# Patient Record
Sex: MALE | Race: WHITE | Employment: OTHER | URBAN - METROPOLITAN AREA
[De-identification: names, ages, dates, MRNs, and addresses within clinical notes are randomized per-mention and may not be internally consistent; named-entity substitution may affect disease eponyms.]

---

## 2017-12-20 LAB
LEFT VENTRICULAR EJECTION FRACTION HIGH VALUE: 60 %
LEFT VENTRICULAR EJECTION FRACTION MODE: NORMAL
LV EF: 55 %

## 2018-06-26 ENCOUNTER — OFFICE VISIT (OUTPATIENT)
Dept: FAMILY MEDICINE CLINIC | Age: 63
End: 2018-06-26
Payer: COMMERCIAL

## 2018-06-26 VITALS
BODY MASS INDEX: 35.49 KG/M2 | TEMPERATURE: 97.5 F | HEART RATE: 67 BPM | SYSTOLIC BLOOD PRESSURE: 118 MMHG | HEIGHT: 68 IN | DIASTOLIC BLOOD PRESSURE: 76 MMHG | OXYGEN SATURATION: 97 % | WEIGHT: 234.2 LBS

## 2018-06-26 DIAGNOSIS — Z12.11 SCREENING FOR COLON CANCER: ICD-10-CM

## 2018-06-26 DIAGNOSIS — I34.0 NON-RHEUMATIC MITRAL REGURGITATION: ICD-10-CM

## 2018-06-26 DIAGNOSIS — Z82.49 FAMILY HISTORY OF CORONARY ARTERIOSCLEROSIS: ICD-10-CM

## 2018-06-26 DIAGNOSIS — F10.11 HISTORY OF ALCOHOL ABUSE: ICD-10-CM

## 2018-06-26 DIAGNOSIS — Z13.220 SCREENING FOR HYPERLIPIDEMIA: ICD-10-CM

## 2018-06-26 DIAGNOSIS — Z87.891 HISTORY OF TOBACCO ABUSE: ICD-10-CM

## 2018-06-26 DIAGNOSIS — K74.5 BILIARY CIRRHOSIS (HCC): ICD-10-CM

## 2018-06-26 DIAGNOSIS — Z13.1 DIABETES MELLITUS SCREENING: ICD-10-CM

## 2018-06-26 DIAGNOSIS — Z23 NEED FOR PROPHYLACTIC VACCINATION AGAINST DIPHTHERIA-TETANUS-PERTUSSIS (DTP): ICD-10-CM

## 2018-06-26 DIAGNOSIS — E78.6 LOW HDL (UNDER 40): ICD-10-CM

## 2018-06-26 DIAGNOSIS — N28.9 RENAL INSUFFICIENCY: ICD-10-CM

## 2018-06-26 DIAGNOSIS — I51.89 DIASTOLIC DYSFUNCTION: ICD-10-CM

## 2018-06-26 DIAGNOSIS — B18.2 CHRONIC HEPATITIS C WITHOUT HEPATIC COMA (HCC): Primary | ICD-10-CM

## 2018-06-26 DIAGNOSIS — R74.8 ELEVATED LIVER ENZYMES: ICD-10-CM

## 2018-06-26 PROCEDURE — 99204 OFFICE O/P NEW MOD 45 MIN: CPT | Performed by: NURSE PRACTITIONER

## 2018-06-26 RX ORDER — LISINOPRIL 10 MG/1
10 TABLET ORAL
COMMUNITY
Start: 2018-01-03 | End: 2018-06-26 | Stop reason: SDUPTHER

## 2018-06-26 ASSESSMENT — PATIENT HEALTH QUESTIONNAIRE - PHQ9
2. FEELING DOWN, DEPRESSED OR HOPELESS: 0
1. LITTLE INTEREST OR PLEASURE IN DOING THINGS: 0
SUM OF ALL RESPONSES TO PHQ9 QUESTIONS 1 & 2: 0
SUM OF ALL RESPONSES TO PHQ QUESTIONS 1-9: 0

## 2018-06-26 ASSESSMENT — ENCOUNTER SYMPTOMS
COUGH: 0
SHORTNESS OF BREATH: 0
EYE DISCHARGE: 0
ABDOMINAL PAIN: 0
BLOOD IN STOOL: 0

## 2018-06-26 NOTE — PROGRESS NOTES
Respiratory: Negative for cough and shortness of breath. Cardiovascular: Negative for chest pain, palpitations and leg swelling. Gastrointestinal: Negative for abdominal pain and blood in stool. Endocrine: Negative for cold intolerance and heat intolerance. Genitourinary: Negative for dysuria and flank pain. Musculoskeletal: Negative for joint swelling and myalgias. Skin: Negative for pallor and rash. Neurological: Negative for dizziness and headaches. Psychiatric/Behavioral: Negative for hallucinations and suicidal ideas. Objective:     Echo 2017   Normal left ventricular ejection fraction. · Mild diastolic dysfunction. · Mild left ventricular hypertrophy. · Mild-to-moderate mitral regurgitation. · Left atrium is mildly dilated. · The aortic valve structure is normal.  · Normal right ventricular systolic function. · The tricuspid valve structure is normal.  · Right atrium is mildly dilated. · No pericardial effusion. Physical Exam   Constitutional: He is oriented to person, place, and time. He appears well-developed and well-nourished. /76   Pulse 67   Temp 97.5 °F (36.4 °C) (Oral)   Ht 5' 8\" (1.727 m)   Wt 234 lb 3.2 oz (106.2 kg)   SpO2 97%   BMI 35.61 kg/m²      HENT:   Head: Normocephalic and atraumatic. Eyes: Conjunctivae and EOM are normal. No scleral icterus. Neck: Neck supple. Cardiovascular: Normal rate, regular rhythm, normal heart sounds and intact distal pulses. Pulmonary/Chest: Effort normal and breath sounds normal. He has no wheezes. He has no rales. Abdominal: Soft. Bowel sounds are normal.   Musculoskeletal: Normal range of motion. He exhibits no edema. Neurological: He is alert and oriented to person, place, and time. Skin: Skin is warm and dry. Psychiatric: He has a normal mood and affect. Nursing note and vitals reviewed.     /76   Pulse 67   Temp 97.5 °F (36.4 °C) (Oral)   Ht 5' 8\" (1.727 m)   Wt 234 lb 3.2 oz prophylactic vaccination against diphtheria-tetanus-pertussis (DTP)     11. Family history of coronary arteriosclerosis     12. History of tobacco abuse     13. History of alcohol abuse     14. Diabetes mellitus screening  Hemoglobin A1C       treatment per GI , [facility]   Labs to f/u on RI  MR, last echo + diast dyf and mild mod MR      Pt has refused colonosc and fit testing with GI he reports. Ref tdap , states had at st [facility] w/in 10 y    Justino Masterson received counseling on the following healthy behaviors: medication adherence  Reviewed prior labs and health maintenance  Continue current medications, diet and exercise. Discussed use, benefit, and side effects of prescribed medications. Barriers to medication compliance addressed. Patient given educational materials - see patient instructions  Was a self-tracking handout given in paper form or via [employer]? Yes    Requested Prescriptions      No prescriptions requested or ordered in this encounter       All patient questions answered. Patient voiced understanding. Quality Measures    Body mass index is 35.61 kg/m². Elevated. Weight control planned discussed Healthy diet and regular exercise. BP: 118/76 Blood pressure is normal. Treatment plan consists of No treatment change needed. No results found for: LDLCALC, LDLCHOLESTEROL, LDLDIRECT (goal LDL reduction with dx if diabetes is 50% LDL reduction)      PHQ Scores 6/26/2018   PHQ2 Score 0   PHQ9 Score 0     Interpretation of Total Score Depression Severity: 1-4 = Minimal depression, 5-9 = Mild depression, 10-14 = Moderate depression, 15-19 = Moderately severe depression, 20-27 = Severe depression    Return in about 2 weeks (around 7/10/2018) for lab f/u .     Orders Placed This Encounter   Procedures    Comprehensive Metabolic Panel     Standing Status:   Future     Standing Expiration Date:   6/26/2019    Lipid Panel     Standing Status:   Future     Standing Expiration Date: 6/26/2019     Order Specific Question:   Is Patient Fasting?/# of Hours     Answer:   12    Hemoglobin A1C     Standing Status:   Future     Standing Expiration Date:   6/26/2019     No orders of the defined types were placed in this encounter. Patient given verbal and/or written educational instructions. Follow up as directed. I have reviewed and agree with the nursing documentation.     Electronically signed by TAMIE Gardner CNP on 6/26/2018 at 1:22 PM

## 2018-06-27 RX ORDER — LISINOPRIL 10 MG/1
10 TABLET ORAL DAILY
Qty: 30 TABLET | Refills: 1 | Status: SHIPPED | OUTPATIENT
Start: 2018-06-27 | End: 2018-08-14 | Stop reason: SINTOL

## 2018-06-29 ENCOUNTER — HOSPITAL ENCOUNTER (OUTPATIENT)
Age: 63
Setting detail: SPECIMEN
Discharge: HOME | End: 2018-06-29
Payer: COMMERCIAL

## 2018-06-29 DIAGNOSIS — Z13.1 DIABETES MELLITUS SCREENING: ICD-10-CM

## 2018-06-29 DIAGNOSIS — R74.8 ELEVATED LIVER ENZYMES: ICD-10-CM

## 2018-06-29 DIAGNOSIS — E78.6 LOW HDL (UNDER 40): ICD-10-CM

## 2018-06-29 DIAGNOSIS — N28.9 RENAL INSUFFICIENCY: ICD-10-CM

## 2018-06-29 LAB
ALBUMIN SERPL-MCNC: 4.6 G/DL (ref 3.5–5.2)
ALBUMIN/GLOBULIN RATIO: 1.5 (ref 1–2.5)
ALP BLD-CCNC: 60 U/L (ref 40–129)
ALT SERPL-CCNC: 43 U/L (ref 5–41)
ANION GAP SERPL CALCULATED.3IONS-SCNC: 15 MMOL/L (ref 9–17)
AST SERPL-CCNC: 33 U/L
BILIRUB SERPL-MCNC: 1.27 MG/DL (ref 0.3–1.2)
BUN BLDV-MCNC: 17 MG/DL (ref 8–23)
BUN/CREAT BLD: ABNORMAL (ref 9–20)
CALCIUM SERPL-MCNC: 9.9 MG/DL (ref 8.6–10.4)
CHLORIDE BLD-SCNC: 104 MMOL/L (ref 98–107)
CHOLESTEROL/HDL RATIO: 7.9
CHOLESTEROL: 220 MG/DL
CO2: 21 MMOL/L (ref 20–31)
CREAT SERPL-MCNC: 1.19 MG/DL (ref 0.7–1.2)
ESTIMATED AVERAGE GLUCOSE: 128 MG/DL
GFR AFRICAN AMERICAN: >60 ML/MIN
GFR NON-AFRICAN AMERICAN: >60 ML/MIN
GFR SERPL CREATININE-BSD FRML MDRD: ABNORMAL ML/MIN/{1.73_M2}
GFR SERPL CREATININE-BSD FRML MDRD: ABNORMAL ML/MIN/{1.73_M2}
GLUCOSE BLD-MCNC: 96 MG/DL (ref 70–99)
HBA1C MFR BLD: 6.1 % (ref 4–6)
HDLC SERPL-MCNC: 28 MG/DL
LDL CHOLESTEROL: 118 MG/DL (ref 0–130)
POTASSIUM SERPL-SCNC: 4.6 MMOL/L (ref 3.7–5.3)
SODIUM BLD-SCNC: 140 MMOL/L (ref 135–144)
TOTAL PROTEIN: 7.7 G/DL (ref 6.4–8.3)
TRIGL SERPL-MCNC: 368 MG/DL
VLDLC SERPL CALC-MCNC: ABNORMAL MG/DL (ref 1–30)

## 2018-07-02 PROBLEM — R73.03 PREDIABETES: Status: ACTIVE | Noted: 2018-07-02

## 2018-07-02 PROBLEM — E78.00 PURE HYPERCHOLESTEROLEMIA: Status: ACTIVE | Noted: 2018-07-02

## 2018-07-06 ENCOUNTER — OFFICE VISIT (OUTPATIENT)
Dept: FAMILY MEDICINE CLINIC | Age: 63
End: 2018-07-06
Payer: COMMERCIAL

## 2018-07-06 VITALS
SYSTOLIC BLOOD PRESSURE: 120 MMHG | WEIGHT: 230.8 LBS | TEMPERATURE: 97.5 F | HEIGHT: 68 IN | OXYGEN SATURATION: 95 % | BODY MASS INDEX: 34.98 KG/M2 | DIASTOLIC BLOOD PRESSURE: 78 MMHG | HEART RATE: 90 BPM

## 2018-07-06 DIAGNOSIS — I34.0 NON-RHEUMATIC MITRAL REGURGITATION: ICD-10-CM

## 2018-07-06 DIAGNOSIS — Z23 NEED FOR SHINGLES VACCINE: ICD-10-CM

## 2018-07-06 DIAGNOSIS — N28.9 RENAL INSUFFICIENCY: ICD-10-CM

## 2018-07-06 DIAGNOSIS — Z00.00 PREVENTATIVE HEALTH CARE: ICD-10-CM

## 2018-07-06 DIAGNOSIS — I51.89 DIASTOLIC DYSFUNCTION: ICD-10-CM

## 2018-07-06 DIAGNOSIS — I51.7 MILD CONCENTRIC LEFT VENTRICULAR HYPERTROPHY (LVH): ICD-10-CM

## 2018-07-06 DIAGNOSIS — Z12.11 SCREENING FOR COLON CANCER: ICD-10-CM

## 2018-07-06 DIAGNOSIS — R73.03 PREDIABETES: Primary | ICD-10-CM

## 2018-07-06 DIAGNOSIS — E78.00 PURE HYPERCHOLESTEROLEMIA: ICD-10-CM

## 2018-07-06 DIAGNOSIS — Z23 NEED FOR DIPHTHERIA-TETANUS-PERTUSSIS (TDAP) VACCINE, ADULT/ADOLESCENT: ICD-10-CM

## 2018-07-06 DIAGNOSIS — R74.8 ELEVATED LIVER ENZYMES: ICD-10-CM

## 2018-07-06 PROCEDURE — 90715 TDAP VACCINE 7 YRS/> IM: CPT | Performed by: NURSE PRACTITIONER

## 2018-07-06 PROCEDURE — 90471 IMMUNIZATION ADMIN: CPT | Performed by: NURSE PRACTITIONER

## 2018-07-06 PROCEDURE — 99214 OFFICE O/P EST MOD 30 MIN: CPT | Performed by: NURSE PRACTITIONER

## 2018-07-06 RX ORDER — FENOFIBRATE 145 MG/1
145 TABLET, COATED ORAL DAILY
Qty: 30 TABLET | Refills: 3 | Status: SHIPPED | OUTPATIENT
Start: 2018-07-06 | End: 2018-11-02 | Stop reason: SDUPTHER

## 2018-07-06 ASSESSMENT — ENCOUNTER SYMPTOMS
CONSTIPATION: 0
COUGH: 0
EYE DISCHARGE: 0
NAUSEA: 0
BLOOD IN STOOL: 0
SHORTNESS OF BREATH: 0
ABDOMINAL PAIN: 0

## 2018-07-06 NOTE — PATIENT INSTRUCTIONS
Patient Education        Prediabetes: Care Instructions  Your Care Instructions    Prediabetes is a warning sign that you are at risk for getting type 2 diabetes. It means that your blood sugar is higher than it should be. The food you eat turns into sugar, which your body uses for energy. Normally, an organ called the pancreas makes insulin, which allows the sugar in your blood to get into your body's cells. But when your body can't use insulin the right way, the sugar doesn't move into cells. It stays in your blood instead. This is called insulin resistance. The buildup of sugar in the blood causes prediabetes. The good news is that lifestyle changes may help you get your blood sugar back to normal and help you avoid or delay diabetes. Follow-up care is a key part of your treatment and safety. Be sure to make and go to all appointments, and call your doctor if you are having problems. It's also a good idea to know your test results and keep a list of the medicines you take. How can you care for yourself at home? · Watch your weight. A healthy weight helps your body use insulin properly. · Limit the amount of calories, sweets, and unhealthy fat you eat. Ask your doctor if you should see a dietitian. A registered dietitian can help you create meal plans that fit your lifestyle. · Get at least 30 minutes of exercise on most days of the week. Exercise helps control your blood sugar. It also helps you maintain a healthy weight. Walking is a good choice. You also may want to do other activities, such as running, swimming, cycling, or playing tennis or team sports. · Do not smoke. Smoking can make prediabetes worse. If you need help quitting, talk to your doctor about stop-smoking programs and medicines. These can increase your chances of quitting for good. · If your doctor prescribed medicines, take them exactly as prescribed. Call your doctor if you think you are having a problem with your medicine.  You will

## 2018-07-19 DIAGNOSIS — Z12.11 SCREENING FOR COLON CANCER: ICD-10-CM

## 2018-07-19 LAB
CONTROL: PRESENT
HEMOCCULT STL QL: NORMAL

## 2018-07-19 PROCEDURE — 82274 ASSAY TEST FOR BLOOD FECAL: CPT | Performed by: NURSE PRACTITIONER

## 2018-07-31 ENCOUNTER — TELEPHONE (OUTPATIENT)
Dept: ONCOLOGY | Age: 63
End: 2018-07-31

## 2018-07-31 DIAGNOSIS — R74.8 ELEVATED LIVER ENZYMES: ICD-10-CM

## 2018-07-31 DIAGNOSIS — E78.00 PURE HYPERCHOLESTEROLEMIA: ICD-10-CM

## 2018-07-31 DIAGNOSIS — R73.03 PREDIABETES: Primary | ICD-10-CM

## 2018-08-01 ENCOUNTER — TELEPHONE (OUTPATIENT)
Dept: FAMILY MEDICINE CLINIC | Age: 63
End: 2018-08-01

## 2018-08-01 ENCOUNTER — HOSPITAL ENCOUNTER (OUTPATIENT)
Age: 63
Setting detail: SPECIMEN
Discharge: HOME | End: 2018-08-01
Payer: COMMERCIAL

## 2018-08-01 DIAGNOSIS — N28.9 RENAL INSUFFICIENCY: Primary | ICD-10-CM

## 2018-08-01 DIAGNOSIS — R76.8 POSITIVE HEPATITIS C ANTIBODY TEST: ICD-10-CM

## 2018-08-01 DIAGNOSIS — R74.8 ELEVATED LIVER ENZYMES: ICD-10-CM

## 2018-08-01 DIAGNOSIS — N28.9 RENAL INSUFFICIENCY: ICD-10-CM

## 2018-08-01 DIAGNOSIS — Z00.00 PREVENTATIVE HEALTH CARE: ICD-10-CM

## 2018-08-01 LAB
ALBUMIN SERPL-MCNC: 4.7 G/DL (ref 3.5–5.2)
ALBUMIN/GLOBULIN RATIO: 1.5 (ref 1–2.5)
ALP BLD-CCNC: 48 U/L (ref 40–129)
ALT SERPL-CCNC: 50 U/L (ref 5–41)
ANION GAP SERPL CALCULATED.3IONS-SCNC: 14 MMOL/L (ref 9–17)
AST SERPL-CCNC: 37 U/L
BILIRUB SERPL-MCNC: 0.75 MG/DL (ref 0.3–1.2)
BUN BLDV-MCNC: 19 MG/DL (ref 8–23)
BUN/CREAT BLD: ABNORMAL (ref 9–20)
CALCIUM SERPL-MCNC: 10.3 MG/DL (ref 8.6–10.4)
CHLORIDE BLD-SCNC: 103 MMOL/L (ref 98–107)
CO2: 23 MMOL/L (ref 20–31)
CREAT SERPL-MCNC: 1.43 MG/DL (ref 0.7–1.2)
GFR AFRICAN AMERICAN: >60 ML/MIN
GFR NON-AFRICAN AMERICAN: 50 ML/MIN
GFR SERPL CREATININE-BSD FRML MDRD: ABNORMAL ML/MIN/{1.73_M2}
GFR SERPL CREATININE-BSD FRML MDRD: ABNORMAL ML/MIN/{1.73_M2}
GLUCOSE BLD-MCNC: 96 MG/DL (ref 70–99)
HEPATITIS C ANTIBODY: REACTIVE
HIV AG/AB: NONREACTIVE
POTASSIUM SERPL-SCNC: 4.9 MMOL/L (ref 3.7–5.3)
PROSTATE SPECIFIC ANTIGEN: 0.29 UG/L
SODIUM BLD-SCNC: 140 MMOL/L (ref 135–144)
TOTAL PROTEIN: 7.9 G/DL (ref 6.4–8.3)

## 2018-08-01 NOTE — TELEPHONE ENCOUNTER
Kidneys appear strained per labs. I see this in his history. Should increase water intake and repeat labs next week. Zhanna Hoang His Hepatitis screening came back + so am placing referral to GI for him. (He has appt 8/14 )  Please update patient.

## 2018-08-02 ENCOUNTER — TELEPHONE (OUTPATIENT)
Dept: FAMILY MEDICINE CLINIC | Age: 63
End: 2018-08-02

## 2018-08-14 ENCOUNTER — HOSPITAL ENCOUNTER (OUTPATIENT)
Age: 63
Setting detail: SPECIMEN
Discharge: HOME | End: 2018-08-14
Payer: COMMERCIAL

## 2018-08-14 ENCOUNTER — OFFICE VISIT (OUTPATIENT)
Dept: FAMILY MEDICINE CLINIC | Age: 63
End: 2018-08-14
Payer: COMMERCIAL

## 2018-08-14 ENCOUNTER — TELEPHONE (OUTPATIENT)
Dept: FAMILY MEDICINE CLINIC | Age: 63
End: 2018-08-14

## 2018-08-14 VITALS
HEART RATE: 97 BPM | SYSTOLIC BLOOD PRESSURE: 124 MMHG | TEMPERATURE: 97.2 F | HEIGHT: 68 IN | BODY MASS INDEX: 35.52 KG/M2 | OXYGEN SATURATION: 97 % | DIASTOLIC BLOOD PRESSURE: 76 MMHG | WEIGHT: 234.4 LBS

## 2018-08-14 DIAGNOSIS — R73.03 PREDIABETES: ICD-10-CM

## 2018-08-14 DIAGNOSIS — I34.0 NON-RHEUMATIC MITRAL REGURGITATION: ICD-10-CM

## 2018-08-14 DIAGNOSIS — E78.00 PURE HYPERCHOLESTEROLEMIA: ICD-10-CM

## 2018-08-14 DIAGNOSIS — N28.9 RENAL INSUFFICIENCY: Primary | ICD-10-CM

## 2018-08-14 DIAGNOSIS — R74.8 ELEVATED LIVER ENZYMES: ICD-10-CM

## 2018-08-14 DIAGNOSIS — N28.9 RENAL INSUFFICIENCY: ICD-10-CM

## 2018-08-14 DIAGNOSIS — Z23 NEED FOR PROPHYLACTIC VACCINATION AND INOCULATION AGAINST VARICELLA: ICD-10-CM

## 2018-08-14 LAB
ANION GAP SERPL CALCULATED.3IONS-SCNC: 15 MMOL/L (ref 9–17)
BUN BLDV-MCNC: 21 MG/DL (ref 8–23)
BUN/CREAT BLD: ABNORMAL (ref 9–20)
CALCIUM SERPL-MCNC: 10.2 MG/DL (ref 8.6–10.4)
CHLORIDE BLD-SCNC: 105 MMOL/L (ref 98–107)
CO2: 22 MMOL/L (ref 20–31)
CREAT SERPL-MCNC: 1.51 MG/DL (ref 0.7–1.2)
GFR AFRICAN AMERICAN: 57 ML/MIN
GFR NON-AFRICAN AMERICAN: 47 ML/MIN
GFR SERPL CREATININE-BSD FRML MDRD: ABNORMAL ML/MIN/{1.73_M2}
GFR SERPL CREATININE-BSD FRML MDRD: ABNORMAL ML/MIN/{1.73_M2}
GLUCOSE BLD-MCNC: 122 MG/DL (ref 70–99)
POTASSIUM SERPL-SCNC: 4.5 MMOL/L (ref 3.7–5.3)
SODIUM BLD-SCNC: 142 MMOL/L (ref 135–144)

## 2018-08-14 PROCEDURE — 99214 OFFICE O/P EST MOD 30 MIN: CPT | Performed by: NURSE PRACTITIONER

## 2018-08-14 ASSESSMENT — ENCOUNTER SYMPTOMS
COUGH: 0
BLOOD IN STOOL: 0
SHORTNESS OF BREATH: 0
EYE DISCHARGE: 0
ABDOMINAL PAIN: 0

## 2018-08-14 NOTE — PROGRESS NOTES
08/01/2018    CREATININE 1.43 08/01/2018    GFRAA >60 08/01/2018    LABGLOM 50 08/01/2018    GLUCOSE 96 08/01/2018    PROT 7.9 08/01/2018    LABALBU 4.7 08/01/2018    CALCIUM 10.3 08/01/2018    BILITOT 0.75 08/01/2018    ALKPHOS 48 08/01/2018    AST 37 08/01/2018    ALT 50 08/01/2018     Lab Results   Component Value Date    LABA1C 6.1 (H) 06/29/2018           Assessment:       Diagnosis Orders   1. Renal insufficiency     2. Pure hypercholesterolemia  Lipid Panel   3. Prediabetes     4. Non-rheumatic mitral regurgitation     5. Elevated liver enzymes     6. Need for prophylactic vaccination and inoculation against varicella  zoster recombinant adjuvanted vaccine (SHINGRIX) 50 MCG SUSR injection       Plan:       Diagnosis Orders   1. Renal insufficiency     2. Pure hypercholesterolemia  Lipid Panel   3. Prediabetes     4. Non-rheumatic mitral regurgitation     5. Elevated liver enzymes     6. Need for prophylactic vaccination and inoculation against varicella  zoster recombinant adjuvanted vaccine (SHINGRIX) 50 MCG SUSR injection     1. Renal insufficiency  If gfr remains down, Cr up, will d/c ace I  Labs ot be done today as not done yet     2. Pure hypercholesterolemia  On tricor. recdheck 1 mo'    3. Prediabetes  The patient is asked to make an attempt to improve diet and exercise patterns to aid in medical management of this problem. 4. Non-rheumatic mitral regurgitation  On BB, Stable. Continue  current therapy      5. Elevated liver enzymes  Single enzyme will monitor      6. Need for prophylactic vaccination and inoculation against varicella        Gisel Ball received counseling on the following healthy behaviors: medication adherence  Reviewed prior labs and health maintenance  Continue current medications, diet and exercise. Discussed use, benefit, and side effects of prescribed medications. Barriers to medication compliance addressed.    Patient given educational materials - see patient instructions  Was

## 2018-08-14 NOTE — TELEPHONE ENCOUNTER
Please update patient. We are d/c'ing his lisinopril. His labs have worsened slightly. Again, I recommend increasing water intake, will recheck in 2 w.      If no improvement, will send him to kidney specialist.

## 2018-09-05 ENCOUNTER — HOSPITAL ENCOUNTER (OUTPATIENT)
Age: 63
Setting detail: SPECIMEN
Discharge: HOME | End: 2018-09-05
Payer: COMMERCIAL

## 2018-09-05 DIAGNOSIS — N28.9 RENAL INSUFFICIENCY: ICD-10-CM

## 2018-09-05 DIAGNOSIS — E78.00 PURE HYPERCHOLESTEROLEMIA: ICD-10-CM

## 2018-09-05 LAB
ANION GAP SERPL CALCULATED.3IONS-SCNC: 17 MMOL/L (ref 9–17)
BUN BLDV-MCNC: 19 MG/DL (ref 8–23)
BUN/CREAT BLD: ABNORMAL (ref 9–20)
CALCIUM SERPL-MCNC: 9.4 MG/DL (ref 8.6–10.4)
CHLORIDE BLD-SCNC: 105 MMOL/L (ref 98–107)
CHOLESTEROL/HDL RATIO: 7.8
CHOLESTEROL: 217 MG/DL
CO2: 18 MMOL/L (ref 20–31)
CREAT SERPL-MCNC: 1.39 MG/DL (ref 0.7–1.2)
GFR AFRICAN AMERICAN: >60 ML/MIN
GFR NON-AFRICAN AMERICAN: 52 ML/MIN
GFR SERPL CREATININE-BSD FRML MDRD: ABNORMAL ML/MIN/{1.73_M2}
GFR SERPL CREATININE-BSD FRML MDRD: ABNORMAL ML/MIN/{1.73_M2}
GLUCOSE BLD-MCNC: 105 MG/DL (ref 70–99)
HDLC SERPL-MCNC: 28 MG/DL
LDL CHOLESTEROL: 132 MG/DL (ref 0–130)
POTASSIUM SERPL-SCNC: 4.1 MMOL/L (ref 3.7–5.3)
SODIUM BLD-SCNC: 140 MMOL/L (ref 135–144)
TRIGL SERPL-MCNC: 284 MG/DL
VLDLC SERPL CALC-MCNC: ABNORMAL MG/DL (ref 1–30)

## 2018-09-11 ENCOUNTER — OFFICE VISIT (OUTPATIENT)
Dept: FAMILY MEDICINE CLINIC | Age: 63
End: 2018-09-11
Payer: COMMERCIAL

## 2018-09-11 VITALS
DIASTOLIC BLOOD PRESSURE: 66 MMHG | HEART RATE: 69 BPM | SYSTOLIC BLOOD PRESSURE: 124 MMHG | WEIGHT: 235.6 LBS | BODY MASS INDEX: 35.71 KG/M2 | OXYGEN SATURATION: 99 % | TEMPERATURE: 97.5 F | HEIGHT: 68 IN

## 2018-09-11 DIAGNOSIS — E66.01 SEVERE OBESITY (BMI 35.0-39.9): ICD-10-CM

## 2018-09-11 DIAGNOSIS — Z86.19 HISTORY OF HEPATITIS C: ICD-10-CM

## 2018-09-11 DIAGNOSIS — I51.7 MILD CONCENTRIC LEFT VENTRICULAR HYPERTROPHY (LVH): ICD-10-CM

## 2018-09-11 DIAGNOSIS — E78.00 PURE HYPERCHOLESTEROLEMIA: ICD-10-CM

## 2018-09-11 DIAGNOSIS — N28.9 RENAL INSUFFICIENCY: ICD-10-CM

## 2018-09-11 DIAGNOSIS — I10 ESSENTIAL HYPERTENSION: Primary | ICD-10-CM

## 2018-09-11 DIAGNOSIS — R73.03 PREDIABETES: ICD-10-CM

## 2018-09-11 DIAGNOSIS — I50.32 CHRONIC DIASTOLIC HEART FAILURE (HCC): ICD-10-CM

## 2018-09-11 PROCEDURE — 99214 OFFICE O/P EST MOD 30 MIN: CPT | Performed by: NURSE PRACTITIONER

## 2018-09-11 ASSESSMENT — ENCOUNTER SYMPTOMS
SHORTNESS OF BREATH: 0
BLOOD IN STOOL: 0
ABDOMINAL PAIN: 0
COUGH: 0
EYE DISCHARGE: 0

## 2018-09-11 NOTE — PROGRESS NOTES
[address] [id number]  [address] [place] [id number]  Dept: [phone]  Dept Fax: [phone]    Fernanda Geiger is a 66 y.o. male who presents today for his medical conditions/complaints as noted below. Fernanda Geiger is c/o of Hypertension and Medication Check        HPI:     Patient presents with:  Hypertension The home BP readings have been in the 120's / 80s range. Renal insuff, d/c lisinopril , and Cr and GFR improved. Good h20 intake   Refusing referral to nephro. Sees dr Blake ULLOA for hep C   Sees cardio dr Alessia Odonnell- doing well, no issues             Past Medical History:   Diagnosis Date    Hepatitis C     Hypertension     Liver disease     sees GI, treated for Hep C     Non-rheumatic mitral regurgitation     sees cardio,    Renal insufficiency 6/26/2018    Severe obesity (BMI 35.0-39.9) ([address] 9/11/2018    Ventricular tachy[facility] [address]       History reviewed. No pertinent surgical history. Family History   Problem Relation Age of Onset    Breast Cancer Mother     Cancer Mother     Coronary Art Dis Father         fatal MI 72       Social History   Substance Use Topics    Smoking status: Former Smoker     Packs/day: 1.50     Years: 40.00     Start date: 8/14/1977     Quit date: 4/20/2015    Smokeless tobacco: Never Used      Comment: quit 2015     Alcohol use 4.2 oz/week     7 Cans of beer per week      Comment: 7 beers/w; ; prev drinking 3-4 X/wk, apprx 40 y      Current Outpatient Prescriptions   Medication Sig Dispense Refill    fenofibrate (TRICOR) 145 MG tablet Take 1 tablet by mouth daily 30 tablet 3    metoprolol tartrate (LOPRESSOR) 25 MG tablet take 1 tablet by mouth twice a day       No current facility-administered medications for this visit. No Known Allergies      Subjective:      Review of Systems   Constitutional: Negative for activity change, chills, fatigue and fever.    Eyes: Negative for discharge and visual disturbance. Respiratory: Negative for cough and shortness of breath. Cardiovascular: Negative for chest pain and leg swelling. Gastrointestinal: Negative for abdominal pain and blood in stool. Endocrine: Negative for cold intolerance and heat intolerance. Genitourinary: Negative for dysuria and flank pain. Musculoskeletal: Negative for joint swelling and myalgias. Skin: Negative for pallor and rash. Neurological: Negative for dizziness and headaches. Psychiatric/Behavioral: Negative for hallucinations and suicidal ideas. Objective:     Physical Exam   Constitutional: He is oriented to person, place, and time. He appears well-developed and well-nourished. /66   Pulse 69   Temp 97.5 °F (36.4 °C) (Oral)   Ht 5' 8\" (1.727 m)   Wt 235 lb 9.6 oz (106.9 kg)   SpO2 99%   BMI 35.82 kg/m²      HENT:   Head: Normocephalic and atraumatic. Eyes: Conjunctivae and EOM are normal. No scleral icterus. Neck: Neck supple. Cardiovascular: Normal rate, regular rhythm, normal heart sounds and intact distal pulses. Pulmonary/Chest: Effort normal and breath sounds normal. He has no wheezes. He has no rales. Abdominal: Soft. Bowel sounds are normal.   Musculoskeletal: Normal range of motion. He exhibits no edema. Neurological: He is alert and oriented to person, place, and time. Skin: Skin is warm and dry. Psychiatric: He has a normal mood and affect. Nursing note and vitals reviewed.     /66   Pulse 69   Temp 97.5 °F (36.4 °C) (Oral)   Ht 5' 8\" (1.727 m)   Wt 235 lb 9.6 oz (106.9 kg)   SpO2 99%   BMI 35.82 kg/m²     CBC: No results found for: WBC, RBC, HGB, HCT, MCV, MCH, MCHC, RDW, PLT, MPV  CMP:    Lab Results   Component Value Date     09/05/2018    K 4.1 09/05/2018     09/05/2018    CO2 18 09/05/2018    BUN 19 09/05/2018    CREATININE 1.39 09/05/2018    GFRAA >60 09/05/2018    LABGLOM 52 09/05/2018    GLUCOSE 105 09/05/2018    PROT 7.9 Concern for statin d/t h/o hep C  He plans to discuss with his cardiologist.   [id number] [facility] educational materials  given       7. Prediabetes  The patient is asked to make an attempt to improve diet and exercise patterns to aid in medical management of this problem. Educational materials  given       8. History of hepatitis C  Under care/monitor of GI  Will not order statin ; will remain on fenofibrate. Pt states he is tired of seeing doctors. Encouraged  Patient to make an attempt to improve diet and exercise patterns, and encouraged education on his health status. Jonah Cotter received counseling on the following healthy behaviors: medication adherence  Reviewed prior labs and health maintenance  Continue current medications, diet and exercise. Discussed use, benefit, and side effects of prescribed medications. Barriers to medication compliance addressed. Patient given educational materials - see patient instructions  Was a self-tracking handout given in paper form or via [employer]? Yes    Requested Prescriptions      No prescriptions requested or ordered in this encounter       All patient questions answered. Patient voiced understanding. Quality Measures    Body mass index is 35.82 kg/m². Elevated. Weight control planned discussed Healthy diet and regular exercise. BP: 124/66 Blood pressure is normal. Treatment plan consists of No treatment change needed. Lab Results   Component Value Date    LDLCHOLESTEROL 132 (H) 09/05/2018    (goal LDL reduction with dx if diabetes is 50% LDL reduction)      PHQ Scores 6/26/2018   PHQ2 Score 0   PHQ9 Score 0     Interpretation of Total Score Depression Severity: 1-4 = Minimal depression, 5-9 = Mild depression, 10-14 = Moderate depression, 15-19 = Moderately severe depression, 20-27 = Severe depression    No Follow-up on file. No orders of the defined types were placed in this encounter.     No orders of the defined types were placed in this

## 2018-09-11 NOTE — PATIENT INSTRUCTIONS
get more details on the specific medicines your doctor prescribes. When should you call for help? Watch closely for changes in your health, and be sure to contact your doctor if:    · You have any symptoms of diabetes. These may include:  ¨ Being thirsty more often. ¨ Urinating more. ¨ Being hungrier. ¨ Losing weight. ¨ Being very tired. ¨ Having blurry vision.     · You have a wound that will not heal.     · You have an infection that will not go away.     · You have problems with your blood pressure.     · You want more information about diabetes and how you can keep from getting it. Where can you learn more? Go to [email or web address] org and sign in to your [employer] account. Enter I222 in the [employer] box to learn more about \"Prediabetes: Care Instructions. \"     If you do not have an account, please click on the \"Sign Up Now\" link. Current as of: December 7, 2017  Content Version: 11.7  © 9342-1003 [employer], [employer]. Care instructions adapted under license by [facility] ([facility]). If you have questions about a medical condition or this instruction, always ask your healthcare professional. [address] any warranty or liability for your use of this information. Patient Education        Learning About the Mediterranean Diet  What is the [id number]? The Mediterranean diet is a style of eating rather than a diet plan. It features foods eaten in [place], [place], [place] and [place], and other countries along the [facility]. It emphasizes eating foods like fish, fruits, vegetables, beans, high-fiber breads and whole grains, nuts, and olive oil. This style of eating includes limited red meat, cheese, and sweets. Why choose the Mediterranean diet? A Mediterranean-style diet may improve heart health. It contains more fat than other heart-healthy diets.  But the fats are mainly from nuts, unsaturated oils (such as fish oils and vinaigrette dressing or hummus instead of dips made from mayonnaise or sour cream.  · Have a piece of fruit for dessert instead of a piece of cake. Try baked apples, or have some dried fruit. Tips for eating out  · Try broiled, grilled, baked, or poached fish instead of having it fried or breaded. · Ask your  to have your meals prepared with olive oil instead of butter. · Order dishes made with marinara sauce or sauces made from olive oil. Avoid sauces made from cream or mayonnaise. · Choose whole-grain breads, whole wheat pasta and pizza crust, brown rice, beans, and lentils. · Cut back on butter or margarine on bread. Instead, you can dip your bread in a small amount of olive oil. · Ask for a side salad or grilled vegetables instead of french fries or chips. Where can you learn more? Go to https://Night [email or web address] Day [email or web address] org and sign in to your [employer] account. Enter [phone] in the [facility] box to learn more about \"Learning About the Mediterranean Diet. \"     If you do not have an account, please click on the \"Sign Up Now\" link. Current as of: May 12, 2017  Content Version: 11.7  © 7224-3020 [employer]. Care instructions adapted under license by [facility] ([facility]). If you have questions about a medical condition or this instruction, always ask your healthcare professional. [place] [address] any warranty or liability for your use of this information.

## 2018-09-12 ENCOUNTER — TELEPHONE (OUTPATIENT)
Dept: FAMILY MEDICINE CLINIC | Age: 63
End: 2018-09-12

## 2018-10-16 ENCOUNTER — NURSE ONLY (OUTPATIENT)
Dept: FAMILY MEDICINE CLINIC | Age: 63
End: 2018-10-16
Payer: COMMERCIAL

## 2018-10-16 VITALS — WEIGHT: 241 LBS | BODY MASS INDEX: 36.64 KG/M2

## 2018-10-16 DIAGNOSIS — Z23 NEED FOR INFLUENZA VACCINATION: Primary | ICD-10-CM

## 2018-10-23 PROCEDURE — 90688 IIV4 VACCINE SPLT 0.5 ML IM: CPT | Performed by: PHYSICIAN ASSISTANT

## 2018-10-23 PROCEDURE — 90471 IMMUNIZATION ADMIN: CPT | Performed by: PHYSICIAN ASSISTANT

## 2018-12-11 ENCOUNTER — OFFICE VISIT (OUTPATIENT)
Dept: FAMILY MEDICINE CLINIC | Age: 63
End: 2018-12-11
Payer: COMMERCIAL

## 2018-12-11 VITALS
HEART RATE: 85 BPM | BODY MASS INDEX: 36.81 KG/M2 | WEIGHT: 242.9 LBS | HEIGHT: 68 IN | DIASTOLIC BLOOD PRESSURE: 82 MMHG | SYSTOLIC BLOOD PRESSURE: 128 MMHG | TEMPERATURE: 98 F | OXYGEN SATURATION: 98 %

## 2018-12-11 DIAGNOSIS — I10 ESSENTIAL HYPERTENSION: Primary | ICD-10-CM

## 2018-12-11 DIAGNOSIS — I50.32 CHRONIC DIASTOLIC HEART FAILURE (HCC): ICD-10-CM

## 2018-12-11 DIAGNOSIS — Z82.49 FAMILY HISTORY OF CORONARY ARTERIOSCLEROSIS: ICD-10-CM

## 2018-12-11 DIAGNOSIS — Z86.19 HISTORY OF HEPATITIS C: ICD-10-CM

## 2018-12-11 DIAGNOSIS — Z23 NEED FOR VACCINATION FOR PNEUMOCOCCUS: ICD-10-CM

## 2018-12-11 DIAGNOSIS — L91.8 SKIN TAG: ICD-10-CM

## 2018-12-11 DIAGNOSIS — E78.00 PURE HYPERCHOLESTEROLEMIA: ICD-10-CM

## 2018-12-11 PROCEDURE — 90732 PPSV23 VACC 2 YRS+ SUBQ/IM: CPT | Performed by: NURSE PRACTITIONER

## 2018-12-11 PROCEDURE — 90471 IMMUNIZATION ADMIN: CPT | Performed by: NURSE PRACTITIONER

## 2018-12-11 PROCEDURE — 99214 OFFICE O/P EST MOD 30 MIN: CPT | Performed by: NURSE PRACTITIONER

## 2018-12-11 ASSESSMENT — ENCOUNTER SYMPTOMS
ABDOMINAL PAIN: 0
BLOOD IN STOOL: 0
SHORTNESS OF BREATH: 0
EYE DISCHARGE: 0
COUGH: 0

## 2018-12-11 NOTE — PROGRESS NOTES
After obtaining consent, and per orders of Dr. Megan Sahni injection of pneumovax 23 given in left deltoid by Monica Hyatt. Patient instructed to remain in clinic for 20 minutes afterwards, and to report any adverse reaction to me immediately.  No reaction at this time

## 2018-12-11 NOTE — PROGRESS NOTES
[facility] [address]  [address] [place], [place] [id number]  Dept: [phone]  Dept Fax: [phone]    Katherine Wilson is a 66 y.o. male who presents today for his medical conditions/complaintsas noted below. Katherine Wilson is c/o of Hypertension; Mass (both armpits); and Referral - General (derm)        HPI:     Patient presents with:  Hypertension  no issues. compliant with meds/no SE      Mass: both armpits   Duration; been there 'forever\"  No ithcing, unsighlty   No bleeding  Static in size   Has seen derm in past [place]     Referral - General: derm    HDL, off statin d/t h/o hep c  Now taking fenofibrate only     Sees Cardio dr Tomeka Khan    H/o hep C , next liver US in approx feb march 2019 for monitoring             Past Medical History:   Diagnosis Date    Hepatitis C     Hypertension     Liver disease     sees GI, treated for Hep C     Non-rheumatic mitral regurgitation     sees cardio,    Renal insufficiency 6/26/2018    Severe obesity (BMI 35.0-39.9) 9/11/2018    Ventricular tachycardia ([address]      History reviewed. No pertinent surgical history. Family History   Problem Relation Age of Onset    Breast Cancer Mother     Cancer Mother     Coronary Art Dis Father         fatal MI 72       Social History   Substance Use Topics    Smoking status: Former Smoker     Packs/day: 1.50     Years: 40.00     Start date: 8/14/1977     Quit date: 4/20/2015    Smokeless tobacco: Never Used      Comment: quit 2015     Alcohol use 4.2 oz/week     7 Cans of beer per week      Comment: 7 beers/w; ; prev drinking 3-4 X/wk, apprx 40 y      Current Outpatient Prescriptions   Medication Sig Dispense Refill    fenofibrate (TRICOR) 145 MG tablet take 1 tablet by mouth once daily 90 tablet 1    metoprolol tartrate (LOPRESSOR) 25 MG tablet take 1 tablet by mouth twice a day       No current facility-administered medications for this visit.       No Known Allergies      Subjective:

## 2019-03-08 ENCOUNTER — HOSPITAL ENCOUNTER (OUTPATIENT)
Age: 64
Setting detail: SPECIMEN
Discharge: HOME | End: 2019-03-08
Payer: COMMERCIAL

## 2019-03-08 LAB
ABSOLUTE EOS #: 0.09 K/UL (ref 0–0.44)
ABSOLUTE IMMATURE GRANULOCYTE: 0.04 K/UL (ref 0–0.3)
ABSOLUTE LYMPH #: 1.89 K/UL (ref 1.1–3.7)
ABSOLUTE MONO #: 0.65 K/UL (ref 0.1–1.2)
AFP: 7.8 UG/L
ANION GAP SERPL CALCULATED.3IONS-SCNC: 14 MMOL/L (ref 9–17)
BASOPHILS # BLD: 1 % (ref 0–2)
BASOPHILS ABSOLUTE: 0.06 K/UL (ref 0–0.2)
BUN BLDV-MCNC: 17 MG/DL (ref 8–23)
BUN/CREAT BLD: ABNORMAL (ref 9–20)
CALCIUM SERPL-MCNC: 9.9 MG/DL (ref 8.6–10.4)
CHLORIDE BLD-SCNC: 104 MMOL/L (ref 98–107)
CO2: 22 MMOL/L (ref 20–31)
CREAT SERPL-MCNC: 1.47 MG/DL (ref 0.7–1.2)
DIFFERENTIAL TYPE: ABNORMAL
EOSINOPHILS RELATIVE PERCENT: 2 % (ref 1–4)
GFR AFRICAN AMERICAN: 59 ML/MIN
GFR NON-AFRICAN AMERICAN: 49 ML/MIN
GFR SERPL CREATININE-BSD FRML MDRD: ABNORMAL ML/MIN/{1.73_M2}
GFR SERPL CREATININE-BSD FRML MDRD: ABNORMAL ML/MIN/{1.73_M2}
GLUCOSE BLD-MCNC: 123 MG/DL (ref 70–99)
HCT VFR BLD CALC: 50.6 % (ref 40.7–50.3)
HEMOGLOBIN: 16.6 G/DL (ref 13–17)
IMMATURE GRANULOCYTES: 1 %
INR BLD: 1.1
LYMPHOCYTES # BLD: 33 % (ref 24–43)
MCH RBC QN AUTO: 30.7 PG (ref 25.2–33.5)
MCHC RBC AUTO-ENTMCNC: 32.8 G/DL (ref 28.4–34.8)
MCV RBC AUTO: 93.7 FL (ref 82.6–102.9)
MONOCYTES # BLD: 11 % (ref 3–12)
NRBC AUTOMATED: 0 PER 100 WBC
PDW BLD-RTO: 13.5 % (ref 11.8–14.4)
PLATELET # BLD: 251 K/UL (ref 138–453)
PLATELET ESTIMATE: ABNORMAL
PMV BLD AUTO: 10.5 FL (ref 8.1–13.5)
POTASSIUM SERPL-SCNC: 4.4 MMOL/L (ref 3.7–5.3)
PROTHROMBIN TIME: 11.5 SEC (ref 9–12)
RBC # BLD: 5.4 M/UL (ref 4.21–5.77)
RBC # BLD: ABNORMAL 10*6/UL
SEG NEUTROPHILS: 52 % (ref 36–65)
SEGMENTED NEUTROPHILS ABSOLUTE COUNT: 3.08 K/UL (ref 1.5–8.1)
SODIUM BLD-SCNC: 140 MMOL/L (ref 135–144)
WBC # BLD: 5.8 K/UL (ref 3.5–11.3)
WBC # BLD: ABNORMAL 10*3/UL

## 2019-03-21 ENCOUNTER — OFFICE VISIT (OUTPATIENT)
Dept: FAMILY MEDICINE CLINIC | Age: 64
End: 2019-03-21
Payer: COMMERCIAL

## 2019-03-21 VITALS
HEIGHT: 68 IN | TEMPERATURE: 97 F | WEIGHT: 249 LBS | SYSTOLIC BLOOD PRESSURE: 119 MMHG | DIASTOLIC BLOOD PRESSURE: 78 MMHG | BODY MASS INDEX: 37.74 KG/M2 | HEART RATE: 77 BPM

## 2019-03-21 DIAGNOSIS — R73.03 PREDIABETES: Primary | ICD-10-CM

## 2019-03-21 DIAGNOSIS — Z87.891 PERSONAL HISTORY OF TOBACCO USE: ICD-10-CM

## 2019-03-21 DIAGNOSIS — R63.5 WEIGHT GAIN: ICD-10-CM

## 2019-03-21 DIAGNOSIS — Z86.19 HISTORY OF HEPATITIS C: ICD-10-CM

## 2019-03-21 DIAGNOSIS — E78.00 PURE HYPERCHOLESTEROLEMIA: ICD-10-CM

## 2019-03-21 DIAGNOSIS — I10 ESSENTIAL HYPERTENSION: ICD-10-CM

## 2019-03-21 DIAGNOSIS — N28.9 RENAL INSUFFICIENCY: ICD-10-CM

## 2019-03-21 DIAGNOSIS — I50.32 CHRONIC DIASTOLIC HEART FAILURE (HCC): ICD-10-CM

## 2019-03-21 DIAGNOSIS — Z00.00 ENCOUNTER FOR MEDICAL EXAMINATION TO ESTABLISH CARE: ICD-10-CM

## 2019-03-21 LAB — HBA1C MFR BLD: 6.6 %

## 2019-03-21 PROCEDURE — 83036 HEMOGLOBIN GLYCOSYLATED A1C: CPT | Performed by: PHYSICIAN ASSISTANT

## 2019-03-21 PROCEDURE — G0296 VISIT TO DETERM LDCT ELIG: HCPCS | Performed by: PHYSICIAN ASSISTANT

## 2019-03-21 PROCEDURE — 99214 OFFICE O/P EST MOD 30 MIN: CPT | Performed by: PHYSICIAN ASSISTANT

## 2019-03-21 ASSESSMENT — ENCOUNTER SYMPTOMS
COUGH: 0
CHEST TIGHTNESS: 0
SHORTNESS OF BREATH: 0
WHEEZING: 0
VOMITING: 0
NAUSEA: 0
DIARRHEA: 0
EYE REDNESS: 0
CONSTIPATION: 0
BLOOD IN STOOL: 0
BACK PAIN: 0
SORE THROAT: 0
SINUS PRESSURE: 0
ABDOMINAL DISTENTION: 0
COLOR CHANGE: 0
PHOTOPHOBIA: 0
ABDOMINAL PAIN: 0

## 2019-03-21 ASSESSMENT — PATIENT HEALTH QUESTIONNAIRE - PHQ9
2. FEELING DOWN, DEPRESSED OR HOPELESS: 0
SUM OF ALL RESPONSES TO PHQ9 QUESTIONS 1 & 2: 0
1. LITTLE INTEREST OR PLEASURE IN DOING THINGS: 0
SUM OF ALL RESPONSES TO PHQ QUESTIONS 1-9: 0
SUM OF ALL RESPONSES TO PHQ QUESTIONS 1-9: 0

## 2019-05-07 ENCOUNTER — HOSPITAL ENCOUNTER (OUTPATIENT)
Age: 64
Discharge: HOME | End: 2019-05-07
Payer: COMMERCIAL

## 2019-05-07 ENCOUNTER — HOSPITAL ENCOUNTER (OUTPATIENT)
Dept: ULTRASOUND IMAGING | Facility: CLINIC | Age: 64
Discharge: HOME | End: 2019-05-09
Payer: COMMERCIAL

## 2019-05-07 DIAGNOSIS — Z86.19 HISTORY OF HEPATITIS C: ICD-10-CM

## 2019-05-07 DIAGNOSIS — E78.1 HYPERTRIGLYCERIDEMIA: Primary | ICD-10-CM

## 2019-05-07 DIAGNOSIS — R63.5 WEIGHT GAIN: ICD-10-CM

## 2019-05-07 DIAGNOSIS — N18.30 CKD (CHRONIC KIDNEY DISEASE), STAGE III (HCC): ICD-10-CM

## 2019-05-07 DIAGNOSIS — E78.00 PURE HYPERCHOLESTEROLEMIA: ICD-10-CM

## 2019-05-07 LAB
-: NORMAL
ABSOLUTE EOS #: 0.16 K/UL (ref 0–0.44)
ABSOLUTE IMMATURE GRANULOCYTE: 0.03 K/UL (ref 0–0.3)
ABSOLUTE LYMPH #: 1.71 K/UL (ref 1.1–3.7)
ABSOLUTE MONO #: 0.75 K/UL (ref 0.1–1.2)
ALBUMIN SERPL-MCNC: 4.7 G/DL (ref 3.5–5.2)
ALBUMIN/GLOBULIN RATIO: 1.5 (ref 1–2.5)
ALP BLD-CCNC: 51 U/L (ref 40–129)
ALT SERPL-CCNC: 39 U/L (ref 5–41)
AMORPHOUS: NORMAL
ANION GAP SERPL CALCULATED.3IONS-SCNC: 14 MMOL/L (ref 9–17)
AST SERPL-CCNC: 29 U/L
BACTERIA: NORMAL
BASOPHILS # BLD: 2 % (ref 0–2)
BASOPHILS ABSOLUTE: 0.09 K/UL (ref 0–0.2)
BILIRUB SERPL-MCNC: 0.66 MG/DL (ref 0.3–1.2)
BILIRUBIN URINE: NEGATIVE
BUN BLDV-MCNC: 23 MG/DL (ref 8–23)
BUN/CREAT BLD: ABNORMAL (ref 9–20)
CALCIUM SERPL-MCNC: 10.1 MG/DL (ref 8.6–10.4)
CASTS UA: NORMAL /LPF (ref 0–8)
CHLORIDE BLD-SCNC: 103 MMOL/L (ref 98–107)
CHOLESTEROL, FASTING: 185 MG/DL
CHOLESTEROL/HDL RATIO: 6.2
CO2: 23 MMOL/L (ref 20–31)
COLOR: YELLOW
COMPLEMENT C4: 14 MG/DL (ref 10–40)
CREAT SERPL-MCNC: 1.53 MG/DL (ref 0.7–1.2)
CREATININE URINE: 126.5 MG/DL (ref 39–259)
CRYSTALS, UA: NORMAL /HPF
DIFFERENTIAL TYPE: ABNORMAL
EOSINOPHILS RELATIVE PERCENT: 3 % (ref 1–4)
EPITHELIAL CELLS UA: NORMAL /HPF (ref 0–5)
FREE KAPPA/LAMBDA RATIO: 1.78 (ref 0.26–1.65)
GFR AFRICAN AMERICAN: 56 ML/MIN
GFR NON-AFRICAN AMERICAN: 46 ML/MIN
GFR SERPL CREATININE-BSD FRML MDRD: ABNORMAL ML/MIN/{1.73_M2}
GFR SERPL CREATININE-BSD FRML MDRD: ABNORMAL ML/MIN/{1.73_M2}
GLUCOSE BLD-MCNC: 123 MG/DL (ref 70–99)
GLUCOSE URINE: NEGATIVE
HAV IGM SER IA-ACNC: NONREACTIVE
HCT VFR BLD CALC: 50.9 % (ref 40.7–50.3)
HDLC SERPL-MCNC: 30 MG/DL
HEMOGLOBIN: 16.3 G/DL (ref 13–17)
HEPATITIS B CORE IGM ANTIBODY: NONREACTIVE
HEPATITIS B SURFACE ANTIGEN: NONREACTIVE
HEPATITIS C ANTIBODY: REACTIVE
IMMATURE GRANULOCYTES: 1 %
KAPPA FREE LIGHT CHAINS QNT: 2.08 MG/DL (ref 0.37–1.94)
KETONES, URINE: NEGATIVE
LAMBDA FREE LIGHT CHAINS QNT: 1.17 MG/DL (ref 0.57–2.63)
LDL CHOLESTEROL: 114 MG/DL (ref 0–130)
LEUKOCYTE ESTERASE, URINE: NEGATIVE
LYMPHOCYTES # BLD: 30 % (ref 24–43)
MCH RBC QN AUTO: 30 PG (ref 25.2–33.5)
MCHC RBC AUTO-ENTMCNC: 32 G/DL (ref 28.4–34.8)
MCV RBC AUTO: 93.6 FL (ref 82.6–102.9)
MONOCYTES # BLD: 13 % (ref 3–12)
MUCUS: NORMAL
NITRITE, URINE: NEGATIVE
NRBC AUTOMATED: 0 PER 100 WBC
OTHER OBSERVATIONS UA: NORMAL
PDW BLD-RTO: 13.4 % (ref 11.8–14.4)
PH UA: 5 (ref 5–8)
PLATELET # BLD: 242 K/UL (ref 138–453)
PLATELET ESTIMATE: ABNORMAL
PMV BLD AUTO: 10.5 FL (ref 8.1–13.5)
POTASSIUM SERPL-SCNC: 4.9 MMOL/L (ref 3.7–5.3)
PROTEIN UA: NEGATIVE
RBC # BLD: 5.44 M/UL (ref 4.21–5.77)
RBC # BLD: ABNORMAL 10*6/UL
RBC UA: NORMAL /HPF (ref 0–4)
RENAL EPITHELIAL, UA: NORMAL /HPF
SEG NEUTROPHILS: 51 % (ref 36–65)
SEGMENTED NEUTROPHILS ABSOLUTE COUNT: 3.01 K/UL (ref 1.5–8.1)
SODIUM BLD-SCNC: 140 MMOL/L (ref 135–144)
SODIUM,UR: 89 MMOL/L
SPECIFIC GRAVITY UA: 1.02 (ref 1–1.03)
TOTAL PROTEIN, URINE: 9 MG/DL
TOTAL PROTEIN: 7.8 G/DL (ref 6.4–8.3)
TRICHOMONAS: NORMAL
TRIGLYCERIDE, FASTING: 204 MG/DL
TSH SERPL DL<=0.05 MIU/L-ACNC: 1.43 MIU/L (ref 0.3–5)
TURBIDITY: CLEAR
URINE HGB: NEGATIVE
URINE TOTAL PROTEIN CREATININE RATIO: 0.07 (ref 0–0.2)
UROBILINOGEN, URINE: NORMAL
VLDLC SERPL CALC-MCNC: ABNORMAL MG/DL (ref 1–30)
WBC # BLD: 5.8 K/UL (ref 3.5–11.3)
WBC # BLD: ABNORMAL 10*3/UL
WBC UA: NORMAL /HPF (ref 0–5)
YEAST: NORMAL

## 2019-05-07 PROCEDURE — 86160 COMPLEMENT ANTIGEN: CPT

## 2019-05-07 PROCEDURE — 82570 ASSAY OF URINE CREATININE: CPT

## 2019-05-07 PROCEDURE — 81001 URINALYSIS AUTO W/SCOPE: CPT

## 2019-05-07 PROCEDURE — 84166 PROTEIN E-PHORESIS/URINE/CSF: CPT

## 2019-05-07 PROCEDURE — 80053 COMPREHEN METABOLIC PANEL: CPT

## 2019-05-07 PROCEDURE — 86038 ANTINUCLEAR ANTIBODIES: CPT

## 2019-05-07 PROCEDURE — 85025 COMPLETE CBC W/AUTO DIFF WBC: CPT

## 2019-05-07 PROCEDURE — 84443 ASSAY THYROID STIM HORMONE: CPT

## 2019-05-07 PROCEDURE — 36415 COLL VENOUS BLD VENIPUNCTURE: CPT

## 2019-05-07 PROCEDURE — 84155 ASSAY OF PROTEIN SERUM: CPT

## 2019-05-07 PROCEDURE — 84156 ASSAY OF PROTEIN URINE: CPT

## 2019-05-07 PROCEDURE — 84300 ASSAY OF URINE SODIUM: CPT

## 2019-05-07 PROCEDURE — 83883 ASSAY NEPHELOMETRY NOT SPEC: CPT

## 2019-05-07 PROCEDURE — 80074 ACUTE HEPATITIS PANEL: CPT

## 2019-05-07 PROCEDURE — 80061 LIPID PANEL: CPT

## 2019-05-07 PROCEDURE — 84165 PROTEIN E-PHORESIS SERUM: CPT

## 2019-05-07 PROCEDURE — 76770 US EXAM ABDO BACK WALL COMP: CPT

## 2019-05-07 PROCEDURE — 83516 IMMUNOASSAY NONANTIBODY: CPT

## 2019-05-07 PROCEDURE — 86334 IMMUNOFIX E-PHORESIS SERUM: CPT

## 2019-05-07 RX ORDER — FENOFIBRATE 160 MG/1
160 TABLET ORAL DAILY
Qty: 90 TABLET | Refills: 1 | Status: SHIPPED | OUTPATIENT
Start: 2019-05-07 | End: 2019-11-03 | Stop reason: SDUPTHER

## 2019-05-08 LAB
ALBUMIN (CALCULATED): 4.9 G/DL (ref 3.2–5.2)
ALBUMIN PERCENT: 66 % (ref 45–65)
ALPHA 1 PERCENT: 2 % (ref 3–6)
ALPHA 2 PERCENT: 8 % (ref 6–13)
ALPHA-1-GLOBULIN: 0.2 G/DL (ref 0.1–0.4)
ALPHA-2-GLOBULIN: 0.6 G/DL (ref 0.5–0.9)
ANTI-NUCLEAR ANTIBODY (ANA): NEGATIVE
BETA GLOBULIN: 0.9 G/DL (ref 0.5–1.1)
BETA PERCENT: 12 % (ref 11–19)
GAMMA GLOBULIN %: 12 % (ref 9–20)
GAMMA GLOBULIN: 0.9 G/DL (ref 0.5–1.5)
P E INTERPRETATION, U: NORMAL
PATHOLOGIST: ABNORMAL
PATHOLOGIST: NORMAL
PATHOLOGIST: NORMAL
PROTEIN ELECTROPHORESIS, SERUM: ABNORMAL
SERUM IFX INTERP: NORMAL
SPECIMEN TYPE: NORMAL
TOTAL PROT. SUM,%: 100 % (ref 98–102)
TOTAL PROT. SUM: 7.5 G/DL (ref 6.3–8.2)
TOTAL PROTEIN: 7.5 G/DL (ref 6.4–8.3)
URINE TOTAL PROTEIN: 9 MG/DL

## 2019-05-09 LAB
ANCA MYELOPEROXIDASE: 15 AU/ML
ANCA PROTEINASE 3: 12 AU/ML

## 2019-05-21 ENCOUNTER — OFFICE VISIT (OUTPATIENT)
Dept: FAMILY MEDICINE CLINIC | Age: 64
End: 2019-05-21
Payer: COMMERCIAL

## 2019-05-21 VITALS
TEMPERATURE: 97.8 F | RESPIRATION RATE: 16 BRPM | HEIGHT: 68 IN | WEIGHT: 244.4 LBS | BODY MASS INDEX: 37.04 KG/M2 | SYSTOLIC BLOOD PRESSURE: 112 MMHG | HEART RATE: 78 BPM | DIASTOLIC BLOOD PRESSURE: 66 MMHG

## 2019-05-21 DIAGNOSIS — I10 ESSENTIAL HYPERTENSION: ICD-10-CM

## 2019-05-21 DIAGNOSIS — E78.1 HYPERTRIGLYCERIDEMIA: Primary | ICD-10-CM

## 2019-05-21 DIAGNOSIS — N28.9 RENAL INSUFFICIENCY: ICD-10-CM

## 2019-05-21 DIAGNOSIS — R73.03 PREDIABETES: ICD-10-CM

## 2019-05-21 DIAGNOSIS — Z86.19 HISTORY OF HEPATITIS C: ICD-10-CM

## 2019-05-21 LAB — HBA1C MFR BLD: 6.4 %

## 2019-05-21 PROCEDURE — 99214 OFFICE O/P EST MOD 30 MIN: CPT | Performed by: PHYSICIAN ASSISTANT

## 2019-05-21 PROCEDURE — 3017F COLORECTAL CA SCREEN DOC REV: CPT | Performed by: PHYSICIAN ASSISTANT

## 2019-05-21 PROCEDURE — 1036F TOBACCO NON-USER: CPT | Performed by: PHYSICIAN ASSISTANT

## 2019-05-21 PROCEDURE — G8427 DOCREV CUR MEDS BY ELIG CLIN: HCPCS | Performed by: PHYSICIAN ASSISTANT

## 2019-05-21 PROCEDURE — 83036 HEMOGLOBIN GLYCOSYLATED A1C: CPT | Performed by: PHYSICIAN ASSISTANT

## 2019-05-21 PROCEDURE — G8417 CALC BMI ABV UP PARAM F/U: HCPCS | Performed by: PHYSICIAN ASSISTANT

## 2019-05-24 ASSESSMENT — ENCOUNTER SYMPTOMS
BLOOD IN STOOL: 0
NAUSEA: 0
SHORTNESS OF BREATH: 0
VOMITING: 0
SINUS PAIN: 0
BACK PAIN: 0
SORE THROAT: 0
WHEEZING: 0
DIARRHEA: 0
ABDOMINAL PAIN: 0
CONSTIPATION: 0
COUGH: 0

## 2019-05-24 NOTE — PROGRESS NOTES
Visit Information    Have you changed or started any medications since your last visit including any over-the-counter medicines, vitamins, or herbal medicines? no   Are you having any side effects from any of your medications? -  no  Have you stopped taking any of your medications? Is so, why? -  no    Have you seen any other physician or provider since your last visit? Yes - Records Obtained  Have you had any other diagnostic tests since your last visit? No  Have you been seen in the emergency room and/or had an admission to a hospital since we last saw you? No  Have you had your routine dental cleaning in the past 6 months? no    Have you activated your E-Sign account? If not, what are your barriers?  Yes     Patient Care Team:  Michelle Trejo PA-C as PCP - General (Physician Assistant)    Medical History Review  Past Medical, Family, and Social History reviewed and does not contribute to the patient presenting condition    Health Maintenance   Topic Date Due    Hepatitis A vaccine (1 of 2 - Risk 2-dose series) 03/26/1958    Diabetic foot exam  03/26/1967    Diabetic retinal exam  03/26/1967    Diabetic microalbuminuria test  03/26/1975    Hepatitis B Vaccine (1 of 3 - Risk 3-dose series) 03/26/1976    Low dose CT lung screening  03/26/2012    Shingles Vaccine (1 of 2) 03/21/2020 (Originally 3/26/2007)    Colon Cancer Screen FIT/FOBT  07/19/2019    A1C test (Diabetic or Prediabetic)  03/21/2020    Lipid screen  05/07/2020    Potassium monitoring  05/07/2020    Creatinine monitoring  05/07/2020    DTaP/Tdap/Td vaccine (2 - Td) 07/06/2028    Flu vaccine  Completed    Pneumococcal 0-64 years Vaccine  Completed    HIV screen  Completed
rate, regular rhythm, S1 normal, S2 normal and normal heart sounds. Pulmonary/Chest: Effort normal and breath sounds normal.   Abdominal: Soft. Bowel sounds are normal. He exhibits no distension. There is no tenderness. Neurological: He is alert and oriented to person, place, and time. Skin: Skin is warm and dry. He is not diaphoretic. Psychiatric: He has a normal mood and affect. His speech is normal and behavior is normal. Judgment and thought content normal. Cognition and memory are normal.   Nursing note and vitals reviewed. Vitals:    05/21/19 1027   BP: 112/66   Site: Left Upper Arm   Position: Sitting   Cuff Size: Large Adult   Pulse: 78   Resp: 16   Temp: 97.8 °F (36.6 °C)   TempSrc: Oral   Weight: 244 lb 6.4 oz (110.9 kg)   Height: 5' 7.99\" (1.727 m)     BP Readings from Last 3 Encounters:   05/21/19 112/66   04/29/19 122/70   03/21/19 119/78          ______________________________________________________________________  Diagnostic findings:  CBC:  Lab Results   Component Value Date    WBC 5.8 05/07/2019    HGB 16.3 05/07/2019     05/07/2019       BMP:    Lab Results   Component Value Date     05/07/2019    K 4.9 05/07/2019     05/07/2019    CO2 23 05/07/2019    BUN 23 05/07/2019    CREATININE 1.53 05/07/2019    GLUCOSE 123 05/07/2019       HEMOGLOBIN A1C:   Lab Results   Component Value Date    LABA1C 6.4 05/21/2019       FASTING LIPID PANEL:  Lab Results   Component Value Date    CHOL 217 (H) 09/05/2018    HDL 30 (L) 05/07/2019    TRIG 284 (H) 09/05/2018       Results for orders placed or performed in visit on 05/21/19   POCT glycosylated hemoglobin (Hb A1C)   Result Value Ref Range    Hemoglobin A1C 6.4 %       ______________________________________________________________________  Assessment and Plan:  Brendandarrick Mena was seen today for check-up. Diagnoses and all orders for this visit:    Hypertriglyceridemia  -     Lipid, Fasting;  Future    Essential hypertension    Renal

## 2019-09-24 ENCOUNTER — HOSPITAL ENCOUNTER (OUTPATIENT)
Age: 64
Setting detail: SPECIMEN
Discharge: HOME | End: 2019-09-24
Payer: COMMERCIAL

## 2019-09-24 DIAGNOSIS — N18.30 CKD (CHRONIC KIDNEY DISEASE), STAGE III (HCC): ICD-10-CM

## 2019-09-24 LAB
ABSOLUTE EOS #: 0.1 K/UL (ref 0–0.44)
ABSOLUTE IMMATURE GRANULOCYTE: <0.03 K/UL (ref 0–0.3)
ABSOLUTE LYMPH #: 1.97 K/UL (ref 1.1–3.7)
ABSOLUTE MONO #: 0.7 K/UL (ref 0.1–1.2)
ANION GAP SERPL CALCULATED.3IONS-SCNC: 17 MMOL/L (ref 9–17)
BASOPHILS # BLD: 1 % (ref 0–2)
BASOPHILS ABSOLUTE: 0.05 K/UL (ref 0–0.2)
BUN BLDV-MCNC: 16 MG/DL (ref 8–23)
BUN/CREAT BLD: ABNORMAL (ref 9–20)
CALCIUM IONIZED: 1.34 MMOL/L (ref 1.13–1.33)
CALCIUM SERPL-MCNC: 10.2 MG/DL (ref 8.6–10.4)
CHLORIDE BLD-SCNC: 102 MMOL/L (ref 98–107)
CO2: 21 MMOL/L (ref 20–31)
COMPLEMENT C3: 141 MG/DL (ref 90–180)
CREAT SERPL-MCNC: 1.54 MG/DL (ref 0.7–1.2)
CREATININE URINE: 115.2 MG/DL (ref 39–259)
DIFFERENTIAL TYPE: ABNORMAL
EOSINOPHILS RELATIVE PERCENT: 2 % (ref 1–4)
GFR AFRICAN AMERICAN: 56 ML/MIN
GFR NON-AFRICAN AMERICAN: 46 ML/MIN
GFR SERPL CREATININE-BSD FRML MDRD: ABNORMAL ML/MIN/{1.73_M2}
GFR SERPL CREATININE-BSD FRML MDRD: ABNORMAL ML/MIN/{1.73_M2}
GLUCOSE BLD-MCNC: 128 MG/DL (ref 70–99)
HCT VFR BLD CALC: 50.6 % (ref 40.7–50.3)
HEMOGLOBIN: 16.6 G/DL (ref 13–17)
IMMATURE GRANULOCYTES: 0 %
LYMPHOCYTES # BLD: 33 % (ref 24–43)
MCH RBC QN AUTO: 30.7 PG (ref 25.2–33.5)
MCHC RBC AUTO-ENTMCNC: 32.8 G/DL (ref 28.4–34.8)
MCV RBC AUTO: 93.5 FL (ref 82.6–102.9)
MONOCYTES # BLD: 12 % (ref 3–12)
NRBC AUTOMATED: 0 PER 100 WBC
PDW BLD-RTO: 13.9 % (ref 11.8–14.4)
PHOSPHORUS: 2.6 MG/DL (ref 2.5–4.5)
PLATELET # BLD: 257 K/UL (ref 138–453)
PLATELET ESTIMATE: ABNORMAL
PMV BLD AUTO: 10.2 FL (ref 8.1–13.5)
POTASSIUM SERPL-SCNC: 4.2 MMOL/L (ref 3.7–5.3)
PTH INTACT: 13.03 PG/ML (ref 15–65)
RBC # BLD: 5.41 M/UL (ref 4.21–5.77)
RBC # BLD: ABNORMAL 10*6/UL
SEG NEUTROPHILS: 52 % (ref 36–65)
SEGMENTED NEUTROPHILS ABSOLUTE COUNT: 3.15 K/UL (ref 1.5–8.1)
SODIUM BLD-SCNC: 140 MMOL/L (ref 135–144)
TOTAL PROTEIN, URINE: 9 MG/DL
VITAMIN D 25-HYDROXY: 46 NG/ML (ref 30–100)
WBC # BLD: 6 K/UL (ref 3.5–11.3)
WBC # BLD: ABNORMAL 10*3/UL

## 2019-09-24 PROCEDURE — 84100 ASSAY OF PHOSPHORUS: CPT

## 2019-09-24 PROCEDURE — 80048 BASIC METABOLIC PNL TOTAL CA: CPT

## 2019-09-24 PROCEDURE — 36415 COLL VENOUS BLD VENIPUNCTURE: CPT

## 2019-09-24 PROCEDURE — 84156 ASSAY OF PROTEIN URINE: CPT

## 2019-09-24 PROCEDURE — 82570 ASSAY OF URINE CREATININE: CPT

## 2019-09-24 PROCEDURE — 85025 COMPLETE CBC W/AUTO DIFF WBC: CPT

## 2019-09-24 PROCEDURE — 83970 ASSAY OF PARATHORMONE: CPT

## 2019-09-24 PROCEDURE — 82330 ASSAY OF CALCIUM: CPT

## 2019-09-24 PROCEDURE — 82306 VITAMIN D 25 HYDROXY: CPT

## 2019-09-24 PROCEDURE — 86160 COMPLEMENT ANTIGEN: CPT

## 2019-11-03 DIAGNOSIS — E78.1 HYPERTRIGLYCERIDEMIA: ICD-10-CM

## 2019-11-03 RX ORDER — FENOFIBRATE 160 MG/1
160 TABLET ORAL DAILY
Qty: 90 TABLET | Refills: 1 | Status: SHIPPED | OUTPATIENT
Start: 2019-11-03 | End: 2020-04-29 | Stop reason: SDUPTHER

## 2019-11-15 ENCOUNTER — HOSPITAL ENCOUNTER (OUTPATIENT)
Age: 64
Discharge: HOME | End: 2019-11-15
Payer: COMMERCIAL

## 2019-11-15 DIAGNOSIS — E78.1 HYPERTRIGLYCERIDEMIA: ICD-10-CM

## 2019-11-15 LAB
CHOLESTEROL, FASTING: 198 MG/DL
CHOLESTEROL/HDL RATIO: 6.8
HDLC SERPL-MCNC: 29 MG/DL
LDL CHOLESTEROL: 110 MG/DL (ref 0–130)
TRIGLYCERIDE, FASTING: 296 MG/DL
VLDLC SERPL CALC-MCNC: ABNORMAL MG/DL (ref 1–30)

## 2019-11-15 PROCEDURE — 80061 LIPID PANEL: CPT

## 2019-11-15 PROCEDURE — 36415 COLL VENOUS BLD VENIPUNCTURE: CPT

## 2019-11-21 ENCOUNTER — OFFICE VISIT (OUTPATIENT)
Dept: FAMILY MEDICINE CLINIC | Age: 64
End: 2019-11-21
Payer: COMMERCIAL

## 2019-11-21 VITALS
HEART RATE: 80 BPM | RESPIRATION RATE: 16 BRPM | SYSTOLIC BLOOD PRESSURE: 125 MMHG | DIASTOLIC BLOOD PRESSURE: 82 MMHG | WEIGHT: 248.6 LBS | TEMPERATURE: 97 F | HEIGHT: 68 IN | BODY MASS INDEX: 37.68 KG/M2

## 2019-11-21 DIAGNOSIS — Z12.11 COLON CANCER SCREENING: ICD-10-CM

## 2019-11-21 DIAGNOSIS — Z23 NEED FOR INFLUENZA VACCINATION: Primary | ICD-10-CM

## 2019-11-21 DIAGNOSIS — R73.03 PREDIABETES: ICD-10-CM

## 2019-11-21 DIAGNOSIS — E78.1 HYPERTRIGLYCERIDEMIA: Primary | ICD-10-CM

## 2019-11-21 PROCEDURE — 3017F COLORECTAL CA SCREEN DOC REV: CPT | Performed by: PHYSICIAN ASSISTANT

## 2019-11-21 PROCEDURE — 90471 IMMUNIZATION ADMIN: CPT | Performed by: PHYSICIAN ASSISTANT

## 2019-11-21 PROCEDURE — G8427 DOCREV CUR MEDS BY ELIG CLIN: HCPCS | Performed by: PHYSICIAN ASSISTANT

## 2019-11-21 PROCEDURE — 99213 OFFICE O/P EST LOW 20 MIN: CPT | Performed by: PHYSICIAN ASSISTANT

## 2019-11-21 PROCEDURE — G8417 CALC BMI ABV UP PARAM F/U: HCPCS | Performed by: PHYSICIAN ASSISTANT

## 2019-11-21 PROCEDURE — 1036F TOBACCO NON-USER: CPT | Performed by: PHYSICIAN ASSISTANT

## 2019-11-21 PROCEDURE — 90686 IIV4 VACC NO PRSV 0.5 ML IM: CPT | Performed by: PHYSICIAN ASSISTANT

## 2019-11-21 PROCEDURE — G8482 FLU IMMUNIZE ORDER/ADMIN: HCPCS | Performed by: PHYSICIAN ASSISTANT

## 2019-11-26 ASSESSMENT — ENCOUNTER SYMPTOMS
WHEEZING: 0
DIARRHEA: 0
CHEST TIGHTNESS: 0
ABDOMINAL DISTENTION: 0
PHOTOPHOBIA: 0
COUGH: 0
CONSTIPATION: 0
NAUSEA: 0
SHORTNESS OF BREATH: 0
ABDOMINAL PAIN: 0
VOMITING: 0
BLOOD IN STOOL: 0
BACK PAIN: 0
EYE REDNESS: 0
SINUS PRESSURE: 0
COLOR CHANGE: 0
SORE THROAT: 0

## 2019-12-09 DIAGNOSIS — Z12.11 COLON CANCER SCREENING: ICD-10-CM

## 2019-12-09 DIAGNOSIS — R19.5 POSITIVE COLORECTAL CANCER SCREENING USING COLOGUARD TEST: Primary | ICD-10-CM

## 2020-01-22 ENCOUNTER — HOSPITAL ENCOUNTER (OUTPATIENT)
Age: 65
Setting detail: SPECIMEN
Discharge: HOME | End: 2020-01-22
Payer: COMMERCIAL

## 2020-01-22 LAB
ABSOLUTE EOS #: 0.08 K/UL (ref 0–0.44)
ABSOLUTE IMMATURE GRANULOCYTE: 0.03 K/UL (ref 0–0.3)
ABSOLUTE LYMPH #: 1.97 K/UL (ref 1.1–3.7)
ABSOLUTE MONO #: 0.71 K/UL (ref 0.1–1.2)
ANION GAP SERPL CALCULATED.3IONS-SCNC: 15 MMOL/L (ref 9–17)
BASOPHILS # BLD: 1 % (ref 0–2)
BASOPHILS ABSOLUTE: 0.07 K/UL (ref 0–0.2)
BUN BLDV-MCNC: 18 MG/DL (ref 8–23)
BUN/CREAT BLD: ABNORMAL (ref 9–20)
CALCIUM IONIZED: 1.39 MMOL/L (ref 1.13–1.33)
CALCIUM SERPL-MCNC: 10.1 MG/DL (ref 8.6–10.4)
CHLORIDE BLD-SCNC: 100 MMOL/L (ref 98–107)
CO2: 22 MMOL/L (ref 20–31)
CREAT SERPL-MCNC: 1.34 MG/DL (ref 0.7–1.2)
CREATININE URINE: 89.5 MG/DL (ref 39–259)
CREATININE URINE: NORMAL MG/DL (ref 39–259)
DIFFERENTIAL TYPE: ABNORMAL
EOSINOPHILS RELATIVE PERCENT: 1 % (ref 1–4)
GFR AFRICAN AMERICAN: >60 ML/MIN
GFR NON-AFRICAN AMERICAN: 54 ML/MIN
GFR SERPL CREATININE-BSD FRML MDRD: ABNORMAL ML/MIN/{1.73_M2}
GFR SERPL CREATININE-BSD FRML MDRD: ABNORMAL ML/MIN/{1.73_M2}
GLUCOSE BLD-MCNC: 160 MG/DL (ref 70–99)
HCT VFR BLD CALC: 46.5 % (ref 40.7–50.3)
HEMOGLOBIN: 16.2 G/DL (ref 13–17)
IMMATURE GRANULOCYTES: 1 %
LYMPHOCYTES # BLD: 34 % (ref 24–43)
MCH RBC QN AUTO: 32.2 PG (ref 25.2–33.5)
MCHC RBC AUTO-ENTMCNC: 34.8 G/DL (ref 28.4–34.8)
MCV RBC AUTO: 92.4 FL (ref 82.6–102.9)
MONOCYTES # BLD: 12 % (ref 3–12)
NRBC AUTOMATED: 0 PER 100 WBC
PDW BLD-RTO: 13.4 % (ref 11.8–14.4)
PHOSPHORUS: 3.2 MG/DL (ref 2.5–4.5)
PLATELET # BLD: 209 K/UL (ref 138–453)
PLATELET ESTIMATE: ABNORMAL
PMV BLD AUTO: 10.2 FL (ref 8.1–13.5)
POTASSIUM SERPL-SCNC: 4.3 MMOL/L (ref 3.7–5.3)
PTH INTACT: 15.41 PG/ML (ref 15–65)
RBC # BLD: 5.03 M/UL (ref 4.21–5.77)
RBC # BLD: ABNORMAL 10*6/UL
SEG NEUTROPHILS: 51 % (ref 36–65)
SEGMENTED NEUTROPHILS ABSOLUTE COUNT: 2.86 K/UL (ref 1.5–8.1)
SODIUM BLD-SCNC: 137 MMOL/L (ref 135–144)
TOTAL PROTEIN, URINE: 8 MG/DL
TOTAL PROTEIN, URINE: NORMAL MG/DL
URINE TOTAL PROTEIN CREATININE RATIO: 0.09 (ref 0–0.2)
VITAMIN D 25-HYDROXY: 38.4 NG/ML (ref 30–100)
WBC # BLD: 5.7 K/UL (ref 3.5–11.3)
WBC # BLD: ABNORMAL 10*3/UL

## 2020-04-29 RX ORDER — FENOFIBRATE 160 MG/1
160 TABLET ORAL DAILY
Qty: 90 TABLET | Refills: 1 | Status: SHIPPED | OUTPATIENT
Start: 2020-04-29 | End: 2020-10-29 | Stop reason: SDUPTHER

## 2020-04-29 NOTE — TELEPHONE ENCOUNTER
Faxed medication request pended medication please advise thank you!           Next Visit Date:  Future Appointments   Date Time Provider Bradley Kramer   5/20/2020  9:30 AM Margot Isidro  [facility]   7/9/2020 10:10 AM Orville Walls MD [facility] None       Health Maintenance   Topic Date Due    Hepatitis A vaccine (1 of 2 - Risk 2-dose series) 03/26/1958    Diabetic retinal exam  03/26/1967    Diabetic microalbuminuria test  03/26/1975    Shingles Vaccine (1 of 2) 03/26/2007    Low dose CT lung screening  03/26/2012    A1C test (Diabetic or Prediabetic)  05/21/2020    Lipid screen  11/15/2020    Diabetic foot exam  11/21/2020    Potassium monitoring  01/22/2021    Creatinine monitoring  01/22/2021    Colon cancer screen fecal DNA test (Cologuard)  12/08/2022    DTaP/Tdap/Td vaccine (2 - Td) 07/06/2028    Flu vaccine  Completed    Pneumococcal 0-64 years Vaccine  Completed    HIV screen  Completed    Hib vaccine  Aged Out    Meningococcal (ACWY) vaccine  Aged Out       Hemoglobin A1C (%)   Date Value   05/21/2019 6.4   03/21/2019 6.6   06/29/2018 6.1 (H)             ( goal A1C is < 7)   No results found for: LABMICR  LDL Cholesterol (mg/dL)   Date Value   11/15/2019 110   05/07/2019 114       (goal LDL is <100)   AST (U/L)   Date Value   05/07/2019 29     ALT (U/L)   Date Value   05/07/2019 39     BUN (mg/dL)   Date Value   01/22/2020 18     BP Readings from Last 3 Encounters:   02/06/20 130/68   11/21/19 125/82   10/10/19 120/74          (goal 120/80)    All Future Testing planned in [facility]  Lab Frequency Next Occurrence   CT Lung Screening Once 04/02/2020   Lipid, Fasting Once 05/21/2020   Microalbumin, Ur Once 44/90/0418   Basic Metabolic Panel Every 16 weeks    CBC Auto Differential Every 16 weeks    Creatinine, Random Urine Every 16 weeks    Phosphorus Every 16 weeks    Protein / creatinine ratio, urine Every 16 weeks    Protein, urine, random Every 16 weeks    PTH, INTACT

## 2020-06-11 LAB
CREATINE, URINE: 173.22 MG/DL
MICROALBUMIN/CREAT 24H UR: 2.6 MG/DL (ref 0–1.9)
MICROALBUMIN/CREAT UR-RTO: 15 MG/G CREAT (ref 0–30)

## 2020-06-30 ENCOUNTER — OFFICE VISIT (OUTPATIENT)
Dept: FAMILY MEDICINE CLINIC | Age: 65
End: 2020-06-30
Payer: COMMERCIAL

## 2020-06-30 VITALS
WEIGHT: 246.2 LBS | HEART RATE: 64 BPM | HEIGHT: 68 IN | SYSTOLIC BLOOD PRESSURE: 130 MMHG | TEMPERATURE: 97.3 F | BODY MASS INDEX: 37.31 KG/M2 | OXYGEN SATURATION: 95 % | DIASTOLIC BLOOD PRESSURE: 88 MMHG

## 2020-06-30 PROCEDURE — 99214 OFFICE O/P EST MOD 30 MIN: CPT | Performed by: PHYSICIAN ASSISTANT

## 2020-06-30 PROCEDURE — 3017F COLORECTAL CA SCREEN DOC REV: CPT | Performed by: PHYSICIAN ASSISTANT

## 2020-06-30 PROCEDURE — G8427 DOCREV CUR MEDS BY ELIG CLIN: HCPCS | Performed by: PHYSICIAN ASSISTANT

## 2020-06-30 PROCEDURE — G8417 CALC BMI ABV UP PARAM F/U: HCPCS | Performed by: PHYSICIAN ASSISTANT

## 2020-06-30 PROCEDURE — 1036F TOBACCO NON-USER: CPT | Performed by: PHYSICIAN ASSISTANT

## 2020-06-30 SDOH — ECONOMIC STABILITY: FOOD INSECURITY: WITHIN THE PAST 12 MONTHS, THE FOOD YOU BOUGHT JUST DIDN'T LAST AND YOU DIDN'T HAVE MONEY TO GET MORE.: PATIENT DECLINED

## 2020-06-30 SDOH — ECONOMIC STABILITY: TRANSPORTATION INSECURITY
IN THE PAST 12 MONTHS, HAS LACK OF TRANSPORTATION KEPT YOU FROM MEETINGS, WORK, OR FROM GETTING THINGS NEEDED FOR DAILY LIVING?: PATIENT DECLINED

## 2020-06-30 SDOH — ECONOMIC STABILITY: TRANSPORTATION INSECURITY
IN THE PAST 12 MONTHS, HAS THE LACK OF TRANSPORTATION KEPT YOU FROM MEDICAL APPOINTMENTS OR FROM GETTING MEDICATIONS?: PATIENT DECLINED

## 2020-06-30 SDOH — ECONOMIC STABILITY: INCOME INSECURITY: HOW HARD IS IT FOR YOU TO PAY FOR THE VERY BASICS LIKE FOOD, HOUSING, MEDICAL CARE, AND HEATING?: PATIENT DECLINED

## 2020-06-30 SDOH — ECONOMIC STABILITY: FOOD INSECURITY: WITHIN THE PAST 12 MONTHS, YOU WORRIED THAT YOUR FOOD WOULD RUN OUT BEFORE YOU GOT MONEY TO BUY MORE.: PATIENT DECLINED

## 2020-06-30 ASSESSMENT — ENCOUNTER SYMPTOMS
BACK PAIN: 0
COUGH: 0
EYE REDNESS: 0
SINUS PAIN: 0
ABDOMINAL DISTENTION: 0
DIARRHEA: 0
SHORTNESS OF BREATH: 0
BLOOD IN STOOL: 0
COLOR CHANGE: 0
RHINORRHEA: 0
PHOTOPHOBIA: 0
VOMITING: 0
NAUSEA: 0
ANAL BLEEDING: 0
RECTAL PAIN: 0
TROUBLE SWALLOWING: 0
CONSTIPATION: 0
CHEST TIGHTNESS: 0
SINUS PRESSURE: 0
WHEEZING: 0
ABDOMINAL PAIN: 0
SORE THROAT: 0

## 2020-06-30 ASSESSMENT — PATIENT HEALTH QUESTIONNAIRE - PHQ9
1. LITTLE INTEREST OR PLEASURE IN DOING THINGS: 0
2. FEELING DOWN, DEPRESSED OR HOPELESS: 0
SUM OF ALL RESPONSES TO PHQ9 QUESTIONS 1 & 2: 0
SUM OF ALL RESPONSES TO PHQ QUESTIONS 1-9: 0
SUM OF ALL RESPONSES TO PHQ QUESTIONS 1-9: 0

## 2020-06-30 NOTE — PROGRESS NOTES
[address] PRIMARY CARE  [address] [place] [address] [address] [facility]  Dept: [phone]  Dept Fax: [phone]    Office Progress Note  Date of patient's visit: 6/30/2020  Patient's Name:  Soco Perez YOB: 1955            [facility] CAR[facility] SU[facility]RE[facility]  ================================================================    REASON FOR VISIT/CHIEF COMPLAINT:  Hypertension and Hyperglycemia    HISTORY OF PRESENTING ILLNESS:  History was obtained from: patient. Soco Perez is a 63 y.o. is here for follow up for hypertriglyceridemia, prediabetes, hypertension. Patient denies any concerns. Patient had a positive Cologuard test in December of last year and referred for colonoscopy. Patient states that he did not schedule it and he does not want to schedule colonoscopy. We discussed concerns in regards to positive Cologuard test and patient states understanding but declines colonoscopy.   Patient also declines any updates on immunizations      Patient Active Problem List   Diagnosis    Elevated liver enzymes    Family history of coronary arteriosclerosis    History of alcohol abuse    History of tobacco abuse    Non-rheumatic mitral regurgitation    Renal insufficiency    Biliary cirrhosis (HCC)    Diastolic dysfunction    Low HDL (under 40)    Prediabetes    Pure hypercholesterolemia    Mild concentric left ventricular hypertrophy (LVH)    Hepatitis C antibody test positive    Chronic diastolic heart failure ([address]    Essential hypertension    Severe obesity with body mass index (BMI) of 35.0 to 39.9 with serious comorbidity ([address]    History of hepatitis C       Health Maintenance Due   Topic Date Due    Hepatitis A vaccine (1 of 2 - Risk 2-dose series) 03/26/1958    Diabetic retinal exam  03/26/1967    Shingles Vaccine (1 of 2) 03/26/2007    Low dose CT lung screening  03/26/2012    A1C test (Diabetic or Prediabetic)  05/21/2020       No Known Allergies sounds: Normal breath sounds and air entry. No stridor, decreased air movement or transmitted upper airway sounds. No decreased breath sounds, wheezing, rhonchi or rales. Chest:      Chest wall: No tenderness. Abdominal:      General: Abdomen is flat. Bowel sounds are normal. There is no distension. Palpations: Abdomen is soft. There is no mass. Tenderness: There is no abdominal tenderness. There is no right CVA tenderness, left CVA tenderness, guarding or rebound. Musculoskeletal: Normal range of motion. General: No tenderness. Lymphadenopathy:      Head:      Right side of head: No submental, submandibular, tonsillar, preauricular, posterior auricular or occipital adenopathy. Left side of head: No submental, submandibular, tonsillar, preauricular or posterior auricular adenopathy. Cervical: No cervical adenopathy. Right cervical: No superficial, deep or posterior cervical adenopathy. Left cervical: No superficial, deep or posterior cervical adenopathy. Upper Body:      Right upper body: No supraclavicular adenopathy. Left upper body: No supraclavicular adenopathy. Skin:     General: Skin is warm and dry. Coloration: Skin is not pale. Findings: No erythema or rash. Neurological:      General: No focal deficit present. Mental Status: He is alert and oriented to person, place, and time. Cranial Nerves: Cranial nerves are intact. No cranial nerve deficit. Sensory: Sensation is intact. Motor: Motor function is intact. Coordination: Coordination is intact. Coordination normal.      Gait: Gait is intact. Deep Tendon Reflexes: Reflexes are normal and symmetric. Psychiatric:         Attention and Perception: Attention and perception normal.         Mood and Affect: Mood and affect normal.         Speech: Speech normal.         Behavior: Behavior normal. Behavior is cooperative. Thought Content:  Thought content normal.         Cognition and Memory: Cognition and memory normal.         Judgment: Judgment normal.           Vitals:    06/30/20 0933   BP: 130/88   Pulse: 64   Temp: 97.3 °F (36.3 °C)   SpO2: 95%   Weight: 246 lb 3.2 oz (111.7 kg)   Height: 5' 8\" (1.727 m)     BP Readings from Last 3 Encounters:   06/30/20 130/88   02/06/20 130/68   11/21/19 125/82              DIAGNOSTIC FINDINGS:  CBC:  Lab Results   Component Value Date    WBC 5.8 06/23/2020    WBC 5.7 01/22/2020    HGB 16.1 06/23/2020     06/23/2020     01/22/2020       BMP:    Lab Results   Component Value Date     06/23/2020    K 4.4 06/23/2020     06/23/2020    CO2 25 06/23/2020    BUN 25 06/23/2020    CREATININE 1.69 06/23/2020    GLUCOSE 258 06/23/2020         FASTING LIPID PANEL:  Lab Results   Component Value Date    CHOL 217 (H) 09/05/2018    HDL 29 (L) 11/15/2019    TRIG 284 (H) 09/05/2018       No results found for this visit on 06/30/20. ASSESSMENT AND PLAN:   Diagnosis Orders   1. Hypertriglyceridemia  Lipid, Fasting   2. Prediabetes  Hemoglobin A1C   3. Prostate cancer screening  PSA Screening   4. Essential hypertension  Continue lopressor as written     Patient has had a positive Cologuard test and referred for colonoscopy but patient declines    FOLLOW UP AND INSTRUCTIONS:  Return in about 6 months (around 12/30/2020), or if symptoms worsen or fail to improve. · Discussed use, benefit, and side effects of prescribed medications. Barriers to medication compliance addressed. All patient questions answered. Pt voiced understanding. · Patient instructed to return to the office if symptoms do not resolve or go directly to the ER if the symptoms worsen - patient voiced understanding.     · Patient given educational materials - see patient instructions    Marcy Briggs [facility]  6/30/2020, 9:56 AM    This note is created with the assistance of a speech-recognition program. While intending to generate a document that actually reflects the content of the visit, the document can still have some mistakes which may not have been identified and corrected by editing.

## 2020-07-23 ENCOUNTER — HOSPITAL ENCOUNTER (OUTPATIENT)
Age: 65
Setting detail: SPECIMEN
Discharge: HOME | End: 2020-07-23
Payer: COMMERCIAL

## 2020-07-23 LAB
ANION GAP SERPL CALCULATED.3IONS-SCNC: 15 MMOL/L (ref 9–17)
BUN BLDV-MCNC: 17 MG/DL (ref 8–23)
BUN/CREAT BLD: ABNORMAL (ref 9–20)
CALCIUM SERPL-MCNC: 10 MG/DL (ref 8.6–10.4)
CHLORIDE BLD-SCNC: 102 MMOL/L (ref 98–107)
CO2: 19 MMOL/L (ref 20–31)
CREAT SERPL-MCNC: 1.32 MG/DL (ref 0.7–1.2)
GFR AFRICAN AMERICAN: >60 ML/MIN
GFR NON-AFRICAN AMERICAN: 55 ML/MIN
GFR SERPL CREATININE-BSD FRML MDRD: ABNORMAL ML/MIN/{1.73_M2}
GFR SERPL CREATININE-BSD FRML MDRD: ABNORMAL ML/MIN/{1.73_M2}
GLUCOSE BLD-MCNC: 152 MG/DL (ref 70–99)
POTASSIUM SERPL-SCNC: 4.1 MMOL/L (ref 3.7–5.3)
SODIUM BLD-SCNC: 136 MMOL/L (ref 135–144)

## 2020-09-24 ENCOUNTER — NURSE ONLY (OUTPATIENT)
Dept: FAMILY MEDICINE CLINIC | Age: 65
End: 2020-09-24
Payer: COMMERCIAL

## 2020-09-24 VITALS
HEIGHT: 68 IN | WEIGHT: 246 LBS | HEART RATE: 88 BPM | DIASTOLIC BLOOD PRESSURE: 88 MMHG | SYSTOLIC BLOOD PRESSURE: 121 MMHG | BODY MASS INDEX: 37.28 KG/M2 | TEMPERATURE: 97.5 F | OXYGEN SATURATION: 94 %

## 2020-09-24 PROCEDURE — 90686 IIV4 VACC NO PRSV 0.5 ML IM: CPT | Performed by: PHYSICIAN ASSISTANT

## 2020-09-24 PROCEDURE — 90471 IMMUNIZATION ADMIN: CPT | Performed by: PHYSICIAN ASSISTANT

## 2020-09-24 NOTE — PROGRESS NOTES
Vaccine Information Sheet, \"Influenza - Inactivated\"  given to Ruelas Public, or parent/legal guardian of  Ruelas Public and verbalized understanding. Patient responses:    Have you ever had a reaction to a flu vaccine? No  Do you have any current illness? No  Have you ever had Guillian [place] Syndrome? No  Do you have a serious allergy to any of the following: Neomycin, Polymyxin, Thimerosal, eggs or egg products? No    Flu vaccine given per order. Please see immunization tab. Risks and benefits explained. Current VIS given.

## 2020-10-29 RX ORDER — FENOFIBRATE 160 MG/1
160 TABLET ORAL DAILY
Qty: 90 TABLET | Refills: 1 | Status: SHIPPED | OUTPATIENT
Start: 2020-10-29 | End: 2020-12-18 | Stop reason: SINTOL

## 2020-10-29 NOTE — TELEPHONE ENCOUNTER
Next Visit Date:  Future Appointments   Date Time Provider Bradley Kramer   12/3/2020 10:30 AM Burgess Binu MD [facility] None   12/30/2020 11:30 AM Colletta Moros, Ressie Sheehan [facility] [address] Maintenance   Topic Date Due    Hepatitis A vaccine (1 of 2 - Risk 2-dose series) 03/26/1958    Diabetic retinal exam  03/26/1967    Shingles Vaccine (1 of 2) 03/26/2007    Low dose CT lung screening  03/26/2012    A1C test (Diabetic or Prediabetic)  05/21/2020    Lipid screen  11/15/2020    Diabetic foot exam  11/21/2020    Diabetic microalbuminuria test  06/11/2021    Potassium monitoring  07/23/2021    Creatinine monitoring  07/23/2021    Colon cancer screen fecal DNA test (Cologuard)  12/08/2022    DTaP/Tdap/Td vaccine (2 - Td) 07/06/2028    Flu vaccine  Completed    Pneumococcal 0-64 years Vaccine  Completed    HIV screen  Completed    Hib vaccine  Aged Out    Meningococcal (ACWY) vaccine  Aged Out       Hemoglobin A1C (%)   Date Value   05/21/2019 6.4   03/21/2019 6.6   06/29/2018 6.1 (H)             ( goal A1C is < 7)   No results found for: LABMICR  LDL Cholesterol (mg/dL)   Date Value   11/15/2019 110   05/07/2019 114       (goal LDL is <100)   AST (U/L)   Date Value   05/07/2019 29     ALT (U/L)   Date Value   05/07/2019 39     BUN (mg/dL)   Date Value   07/23/2020 17     BP Readings from Last 3 Encounters:   09/24/20 121/88   07/09/20 128/70   06/30/20 130/88          (goal 120/80)    All Future Testing planned in [facility]  Lab Frequency Next Occurrence   Lipid, Fasting Once 05/21/2020   Microalbumin, Ur Once 05/05/2020   Hemoglobin A1C Once 06/30/2020   Lipid, Fasting Once 06/30/2020   PSA Screening Once 15/19/2460   Basic Metabolic Panel Every 16 weeks    CBC Auto Differential Every 16 weeks    Creatinine, Random Urine Every 16 weeks    Protein / creatinine ratio, urine Every 16 weeks    Protein, urine, random Every 16 weeks    PTH, INTACT WITH IONIZED CALCIUM Every 16 weeks Vitamin D 25 Hydroxy Every 16 weeks    Phosphorus Every 16 weeks    Basic Metabolic Panel Every 16 weeks    CBC Auto Differential Every 16 weeks    Creatinine, Random Urine Every 16 weeks    Phosphorus Every 16 weeks    Protein / creatinine ratio, urine Every 16 weeks    PTH, INTACT WITH IONIZED CALCIUM Every 16 weeks    Protein, urine, random Every 16 weeks    Vitamin D 25 Hydroxy Every 16 weeks                Patient Active Problem List:     Elevated liver enzymes     Family history of coronary arteriosclerosis     History of alcohol abuse     History of tobacco abuse     Non-rheumatic mitral regurgitation     Renal insufficiency     Biliary cirrhosis (HCC)     Diastolic dysfunction     Low HDL (under 40)     Prediabetes     Pure hypercholesterolemia     Mild concentric left ventricular hypertrophy (LVH)     Hepatitis C antibody test positive     Chronic diastolic heart failure ([address]     Essential hypertension     Severe obesity with body mass index (BMI) of 35.0 to 39.9 with serious comorbidity ([address]     History of hepatitis C

## 2020-11-24 ENCOUNTER — HOSPITAL ENCOUNTER (OUTPATIENT)
Age: 65
Setting detail: SPECIMEN
Discharge: HOME | End: 2020-11-24
Payer: COMMERCIAL

## 2020-11-24 LAB
ABSOLUTE EOS #: 0.06 K/UL (ref 0–0.44)
ABSOLUTE IMMATURE GRANULOCYTE: 0.04 K/UL (ref 0–0.3)
ABSOLUTE LYMPH #: 2.18 K/UL (ref 1.1–3.7)
ABSOLUTE MONO #: 0.68 K/UL (ref 0.1–1.2)
ANION GAP SERPL CALCULATED.3IONS-SCNC: 18 MMOL/L (ref 9–17)
BASOPHILS # BLD: 1 % (ref 0–2)
BASOPHILS ABSOLUTE: 0.05 K/UL (ref 0–0.2)
BUN BLDV-MCNC: 22 MG/DL (ref 8–23)
BUN/CREAT BLD: ABNORMAL (ref 9–20)
CALCIUM IONIZED: 1.41 MMOL/L (ref 1.13–1.33)
CALCIUM SERPL-MCNC: 10.7 MG/DL (ref 8.6–10.4)
CHLORIDE BLD-SCNC: 100 MMOL/L (ref 98–107)
CO2: 19 MMOL/L (ref 20–31)
CREAT SERPL-MCNC: 1.27 MG/DL (ref 0.7–1.2)
CREATININE URINE: 73.6 MG/DL (ref 39–259)
DIFFERENTIAL TYPE: ABNORMAL
EOSINOPHILS RELATIVE PERCENT: 1 % (ref 1–4)
GFR AFRICAN AMERICAN: >60 ML/MIN
GFR NON-AFRICAN AMERICAN: 57 ML/MIN
GFR SERPL CREATININE-BSD FRML MDRD: ABNORMAL ML/MIN/{1.73_M2}
GFR SERPL CREATININE-BSD FRML MDRD: ABNORMAL ML/MIN/{1.73_M2}
GLUCOSE BLD-MCNC: 310 MG/DL (ref 70–99)
HCT VFR BLD CALC: 52.8 % (ref 40.7–50.3)
HEMOGLOBIN: 17.3 G/DL (ref 13–17)
IMMATURE GRANULOCYTES: 1 %
LYMPHOCYTES # BLD: 36 % (ref 24–43)
MCH RBC QN AUTO: 30 PG (ref 25.2–33.5)
MCHC RBC AUTO-ENTMCNC: 32.8 G/DL (ref 28.4–34.8)
MCV RBC AUTO: 91.7 FL (ref 82.6–102.9)
MONOCYTES # BLD: 11 % (ref 3–12)
NRBC AUTOMATED: 0 PER 100 WBC
PDW BLD-RTO: 13.2 % (ref 11.8–14.4)
PHOSPHORUS: 2.8 MG/DL (ref 2.5–4.5)
PLATELET # BLD: 238 K/UL (ref 138–453)
PLATELET ESTIMATE: ABNORMAL
PMV BLD AUTO: 10.6 FL (ref 8.1–13.5)
POTASSIUM SERPL-SCNC: 4.5 MMOL/L (ref 3.7–5.3)
PTH INTACT: 7.85 PG/ML (ref 15–65)
RBC # BLD: 5.76 M/UL (ref 4.21–5.77)
RBC # BLD: ABNORMAL 10*6/UL
SEG NEUTROPHILS: 50 % (ref 36–65)
SEGMENTED NEUTROPHILS ABSOLUTE COUNT: 3 K/UL (ref 1.5–8.1)
SODIUM BLD-SCNC: 137 MMOL/L (ref 135–144)
TOTAL PROTEIN, URINE: 9 MG/DL
URINE TOTAL PROTEIN CREATININE RATIO: 0.12 (ref 0–0.2)
VITAMIN D 25-HYDROXY: 42.6 NG/ML (ref 30–100)
WBC # BLD: 6 K/UL (ref 3.5–11.3)
WBC # BLD: ABNORMAL 10*3/UL

## 2020-11-30 ENCOUNTER — HOSPITAL ENCOUNTER (OUTPATIENT)
Age: 65
Setting detail: SPECIMEN
Discharge: HOME | End: 2020-11-30
Payer: COMMERCIAL

## 2020-11-30 LAB
ABSOLUTE EOS #: 0.06 K/UL (ref 0–0.44)
ABSOLUTE IMMATURE GRANULOCYTE: 0.04 K/UL (ref 0–0.3)
ABSOLUTE LYMPH #: 2.23 K/UL (ref 1.1–3.7)
ABSOLUTE MONO #: 0.67 K/UL (ref 0.1–1.2)
ANION GAP SERPL CALCULATED.3IONS-SCNC: 17 MMOL/L (ref 9–17)
BASOPHILS # BLD: 1 % (ref 0–2)
BASOPHILS ABSOLUTE: 0.08 K/UL (ref 0–0.2)
BUN BLDV-MCNC: 17 MG/DL (ref 8–23)
BUN/CREAT BLD: ABNORMAL (ref 9–20)
CALCIUM IONIZED: 1.34 MMOL/L (ref 1.13–1.33)
CALCIUM SERPL-MCNC: 10 MG/DL (ref 8.6–10.4)
CHLORIDE BLD-SCNC: 100 MMOL/L (ref 98–107)
CO2: 19 MMOL/L (ref 20–31)
CREAT SERPL-MCNC: 1.3 MG/DL (ref 0.7–1.2)
DIFFERENTIAL TYPE: ABNORMAL
EOSINOPHILS RELATIVE PERCENT: 1 % (ref 1–4)
GFR AFRICAN AMERICAN: >60 ML/MIN
GFR NON-AFRICAN AMERICAN: 56 ML/MIN
GFR SERPL CREATININE-BSD FRML MDRD: ABNORMAL ML/MIN/{1.73_M2}
GFR SERPL CREATININE-BSD FRML MDRD: ABNORMAL ML/MIN/{1.73_M2}
GLUCOSE BLD-MCNC: 283 MG/DL (ref 70–99)
HCT VFR BLD CALC: 51.2 % (ref 40.7–50.3)
HEMOGLOBIN: 16.8 G/DL (ref 13–17)
IMMATURE GRANULOCYTES: 1 %
LYMPHOCYTES # BLD: 36 % (ref 24–43)
MCH RBC QN AUTO: 30.2 PG (ref 25.2–33.5)
MCHC RBC AUTO-ENTMCNC: 32.8 G/DL (ref 28.4–34.8)
MCV RBC AUTO: 92.1 FL (ref 82.6–102.9)
MONOCYTES # BLD: 11 % (ref 3–12)
NRBC AUTOMATED: 0 PER 100 WBC
PDW BLD-RTO: 13.2 % (ref 11.8–14.4)
PLATELET # BLD: 241 K/UL (ref 138–453)
PLATELET ESTIMATE: ABNORMAL
PMV BLD AUTO: 10.8 FL (ref 8.1–13.5)
POTASSIUM SERPL-SCNC: 4.6 MMOL/L (ref 3.7–5.3)
RBC # BLD: 5.56 M/UL (ref 4.21–5.77)
RBC # BLD: ABNORMAL 10*6/UL
SEG NEUTROPHILS: 50 % (ref 36–65)
SEGMENTED NEUTROPHILS ABSOLUTE COUNT: 3.15 K/UL (ref 1.5–8.1)
SODIUM BLD-SCNC: 136 MMOL/L (ref 135–144)
WBC # BLD: 6.2 K/UL (ref 3.5–11.3)
WBC # BLD: ABNORMAL 10*3/UL

## 2020-12-16 ENCOUNTER — HOSPITAL ENCOUNTER (OUTPATIENT)
Age: 65
Setting detail: SPECIMEN
Discharge: HOME | End: 2020-12-16
Payer: COMMERCIAL

## 2020-12-16 LAB
ABSOLUTE EOS #: 0.1 K/UL (ref 0–0.44)
ABSOLUTE IMMATURE GRANULOCYTE: 0.03 K/UL (ref 0–0.3)
ABSOLUTE LYMPH #: 2.81 K/UL (ref 1.1–3.7)
ABSOLUTE MONO #: 0.79 K/UL (ref 0.1–1.2)
ALBUMIN SERPL-MCNC: 4.4 G/DL (ref 3.5–5.2)
ALBUMIN/GLOBULIN RATIO: 1.3 (ref 1–2.5)
ALP BLD-CCNC: 88 U/L (ref 40–129)
ALT SERPL-CCNC: 37 U/L (ref 5–41)
ANION GAP SERPL CALCULATED.3IONS-SCNC: 16 MMOL/L (ref 9–17)
AST SERPL-CCNC: 29 U/L
BASOPHILS # BLD: 1 % (ref 0–2)
BASOPHILS ABSOLUTE: 0.07 K/UL (ref 0–0.2)
BILIRUB SERPL-MCNC: 0.67 MG/DL (ref 0.3–1.2)
BUN BLDV-MCNC: 21 MG/DL (ref 8–23)
BUN/CREAT BLD: ABNORMAL (ref 9–20)
CALCIUM SERPL-MCNC: 10.4 MG/DL (ref 8.6–10.4)
CHLORIDE BLD-SCNC: 101 MMOL/L (ref 98–107)
CHOLESTEROL, FASTING: 239 MG/DL
CHOLESTEROL/HDL RATIO: 8.9
CO2: 20 MMOL/L (ref 20–31)
CREAT SERPL-MCNC: 1.31 MG/DL (ref 0.7–1.2)
DIFFERENTIAL TYPE: ABNORMAL
EOSINOPHILS RELATIVE PERCENT: 1 % (ref 1–4)
ESTIMATED AVERAGE GLUCOSE: 258 MG/DL
GFR AFRICAN AMERICAN: >60 ML/MIN
GFR NON-AFRICAN AMERICAN: 55 ML/MIN
GFR SERPL CREATININE-BSD FRML MDRD: ABNORMAL ML/MIN/{1.73_M2}
GFR SERPL CREATININE-BSD FRML MDRD: ABNORMAL ML/MIN/{1.73_M2}
GLUCOSE FASTING: 268 MG/DL (ref 70–99)
HBA1C MFR BLD: 10.6 % (ref 4–6)
HCT VFR BLD CALC: 54.5 % (ref 40.7–50.3)
HDLC SERPL-MCNC: 27 MG/DL
HEMOGLOBIN: 17.5 G/DL (ref 13–17)
IMMATURE GRANULOCYTES: 0 %
LDL CHOLESTEROL: 132 MG/DL (ref 0–130)
LYMPHOCYTES # BLD: 37 % (ref 24–43)
MCH RBC QN AUTO: 29.7 PG (ref 25.2–33.5)
MCHC RBC AUTO-ENTMCNC: 32.1 G/DL (ref 28.4–34.8)
MCV RBC AUTO: 92.5 FL (ref 82.6–102.9)
MONOCYTES # BLD: 10 % (ref 3–12)
NRBC AUTOMATED: 0 PER 100 WBC
PDW BLD-RTO: 13.3 % (ref 11.8–14.4)
PLATELET # BLD: 223 K/UL (ref 138–453)
PLATELET ESTIMATE: ABNORMAL
PMV BLD AUTO: 10.5 FL (ref 8.1–13.5)
POTASSIUM SERPL-SCNC: 4.2 MMOL/L (ref 3.7–5.3)
PROSTATE SPECIFIC ANTIGEN: 0.22 UG/L
RBC # BLD: 5.89 M/UL (ref 4.21–5.77)
RBC # BLD: ABNORMAL 10*6/UL
SEG NEUTROPHILS: 51 % (ref 36–65)
SEGMENTED NEUTROPHILS ABSOLUTE COUNT: 3.76 K/UL (ref 1.5–8.1)
SODIUM BLD-SCNC: 137 MMOL/L (ref 135–144)
TOTAL PROTEIN: 7.8 G/DL (ref 6.4–8.3)
TRIGLYCERIDE, FASTING: 400 MG/DL
VLDLC SERPL CALC-MCNC: ABNORMAL MG/DL (ref 1–30)
WBC # BLD: 7.6 K/UL (ref 3.5–11.3)
WBC # BLD: ABNORMAL 10*3/UL

## 2020-12-18 ENCOUNTER — OFFICE VISIT (OUTPATIENT)
Dept: FAMILY MEDICINE CLINIC | Age: 65
End: 2020-12-18
Payer: COMMERCIAL

## 2020-12-18 ENCOUNTER — TELEPHONE (OUTPATIENT)
Dept: FAMILY MEDICINE CLINIC | Age: 65
End: 2020-12-18

## 2020-12-18 VITALS
OXYGEN SATURATION: 98 % | DIASTOLIC BLOOD PRESSURE: 78 MMHG | TEMPERATURE: 97 F | HEART RATE: 94 BPM | BODY MASS INDEX: 36.37 KG/M2 | SYSTOLIC BLOOD PRESSURE: 124 MMHG | WEIGHT: 240 LBS | RESPIRATION RATE: 14 BRPM | HEIGHT: 68 IN

## 2020-12-18 PROCEDURE — 3046F HEMOGLOBIN A1C LEVEL >9.0%: CPT | Performed by: PHYSICIAN ASSISTANT

## 2020-12-18 PROCEDURE — 99214 OFFICE O/P EST MOD 30 MIN: CPT | Performed by: PHYSICIAN ASSISTANT

## 2020-12-18 PROCEDURE — 1036F TOBACCO NON-USER: CPT | Performed by: PHYSICIAN ASSISTANT

## 2020-12-18 PROCEDURE — G8482 FLU IMMUNIZE ORDER/ADMIN: HCPCS | Performed by: PHYSICIAN ASSISTANT

## 2020-12-18 PROCEDURE — 2022F DILAT RTA XM EVC RTNOPTHY: CPT | Performed by: PHYSICIAN ASSISTANT

## 2020-12-18 PROCEDURE — G8417 CALC BMI ABV UP PARAM F/U: HCPCS | Performed by: PHYSICIAN ASSISTANT

## 2020-12-18 PROCEDURE — G8427 DOCREV CUR MEDS BY ELIG CLIN: HCPCS | Performed by: PHYSICIAN ASSISTANT

## 2020-12-18 PROCEDURE — 3017F COLORECTAL CA SCREEN DOC REV: CPT | Performed by: PHYSICIAN ASSISTANT

## 2020-12-18 RX ORDER — INSULIN LISPRO 200 [IU]/ML
INJECTION, SOLUTION SUBCUTANEOUS
Qty: 2 PEN | Refills: 3 | Status: SHIPPED | OUTPATIENT
Start: 2020-12-18 | End: 2021-01-25 | Stop reason: SDUPTHER

## 2020-12-18 RX ORDER — ATORVASTATIN CALCIUM 20 MG/1
20 TABLET, FILM COATED ORAL DAILY
Qty: 30 TABLET | Refills: 3 | Status: SHIPPED | OUTPATIENT
Start: 2020-12-18 | End: 2021-04-07 | Stop reason: SDUPTHER

## 2020-12-18 RX ORDER — INSULIN GLARGINE 100 [IU]/ML
20 INJECTION, SOLUTION SUBCUTANEOUS NIGHTLY
Qty: 5 PEN | Refills: 3 | Status: SHIPPED | OUTPATIENT
Start: 2020-12-18 | End: 2021-01-18

## 2020-12-18 RX ORDER — GLIMEPIRIDE 2 MG/1
2 TABLET ORAL EVERY MORNING
Qty: 30 TABLET | Refills: 3 | Status: SHIPPED | OUTPATIENT
Start: 2020-12-18 | End: 2021-04-07 | Stop reason: SDUPTHER

## 2020-12-18 ASSESSMENT — PATIENT HEALTH QUESTIONNAIRE - PHQ9
SUM OF ALL RESPONSES TO PHQ QUESTIONS 1-9: 0
SUM OF ALL RESPONSES TO PHQ9 QUESTIONS 1 & 2: 0
2. FEELING DOWN, DEPRESSED OR HOPELESS: 0
SUM OF ALL RESPONSES TO PHQ QUESTIONS 1-9: 0
1. LITTLE INTEREST OR PLEASURE IN DOING THINGS: 0
SUM OF ALL RESPONSES TO PHQ QUESTIONS 1-9: 0

## 2020-12-18 NOTE — TELEPHONE ENCOUNTER
Pharmacy called back stating they need to know if he needs Pen needles and how many times a day for the needles and humalog please advise thank you!

## 2020-12-20 PROBLEM — E11.9 DIABETES MELLITUS, NEW ONSET (HCC): Status: ACTIVE | Noted: 2020-12-20

## 2020-12-20 ASSESSMENT — ENCOUNTER SYMPTOMS
WHEEZING: 0
VOMITING: 0
COUGH: 0
DIARRHEA: 0
NAUSEA: 0
CONSTIPATION: 0
ABDOMINAL PAIN: 0
ANAL BLEEDING: 0
BACK PAIN: 0
BLOOD IN STOOL: 0
SHORTNESS OF BREATH: 0
ABDOMINAL DISTENTION: 0
RECTAL PAIN: 0
CHEST TIGHTNESS: 0

## 2020-12-20 NOTE — PROGRESS NOTES
[address] PRIMARY CARE  [address] [place] [address] [address] [facility]  Dept: [phone]  Dept Fax: [phone]    Office Progress Note  Date of patient's visit: 12/20/2020  Patient's Name:  Maggie Gilbert YOB: 1955            [facility] CARMEN SUÁREZ PA  ================================================================    REASON FOR VISIT/CHIEF COMPLAINT:  Diabetes and Hypertension    HISTORY OF PRESENTING ILLNESS:  History was obtained from: patient. Maggie Gilbert is a 63 y.o. is here for follow up for new onset DM. Pt has had increased thirst and urination without any abd pain or vomiting. Pt denies any hematuria or dysuria.  A1c 10 last check was 6.7. pt denies any CP, SOB or palpitations      Patient Active Problem List   Diagnosis    Elevated liver enzymes    Family history of coronary arteriosclerosis    History of alcohol abuse    History of tobacco abuse    Non-rheumatic mitral regurgitation    Renal insufficiency    Biliary cirrhosis (HCC)    Diastolic dysfunction    Low HDL (under 40)    Prediabetes    Pure hypercholesterolemia    Mild concentric left ventricular hypertrophy (LVH)    Hepatitis C antibody test positive    Chronic diastolic heart failure ([address]    Essential hypertension    Severe obesity with body mass index (BMI) of 35.0 to 39.9 with serious comorbidity ([address]    History of hepatitis C    Diabetes mellitus, new onset ([address]       Health Maintenance Due   Topic Date Due    Hepatitis A vaccine (1 of 2 - Risk 2-dose series) 03/26/1958    Diabetic retinal exam  03/26/1967    Shingles Vaccine (1 of 2) 03/26/2007    Low dose CT lung screening  03/26/2012       No Known Allergies      Current Outpatient Medications   Medication Sig Dispense Refill    atorvastatin (LIPITOR) 20 MG tablet Take 1 tablet by mouth daily 30 tablet 3    glimepiride (AMARYL) 2 MG tablet Take 1 tablet by mouth every morning 30 tablet 3  insulin glargine (LANTUS SOLOSTAR) 100 UNIT/ML injection pen Inject 20 Units into the skin nightly 5 pen 3    insulin lispro (HUMALOG KWIKPEN) 200 UNIT/ML SOPN pen 0-150=0 units  151-200=2 units  201-250=4 units  251-300=6 units   301-350= 8 units  351+ = 10 units 2 pen 3    metoprolol tartrate (LOPRESSOR) 25 MG tablet take 1 tablet by mouth twice a day       No current facility-administered medications for this visit. Social History     Tobacco Use    Smoking status: Former Smoker     Packs/day: 1.50     Years: 40.00     Pack years: 60.00     Start date: 1977     Quit date: 2015     Years since quittin.6    Smokeless tobacco: Never Used    Tobacco comment: quit     Substance Use Topics    Alcohol use: Yes     Alcohol/week: 7.0 standard drinks     Types: 7 Cans of beer per week     Comment: 7 beers/w; ; prev drinking 3-4 X/wk, apprx 40 y   [facility] Drug use: No       Family History   Problem Relation Age of Onset    Breast Cancer Mother     Cancer Mother     Coronary Art Dis Father         fatal MI 72        Review of Systems   Constitutional: Negative for appetite change, chills, diaphoresis, fatigue, fever and unexpected weight change. Respiratory: Negative for cough, chest tightness, shortness of breath and wheezing. Cardiovascular: Negative for chest pain, palpitations and leg swelling. Gastrointestinal: Negative for abdominal distention, abdominal pain, anal bleeding, blood in stool, constipation, diarrhea, nausea, rectal pain and vomiting. Endocrine: Positive for polydipsia and polyuria. Negative for polyphagia. Genitourinary: Positive for frequency. Negative for decreased urine volume, difficulty urinating, dysuria, hematuria and urgency. Musculoskeletal: Negative for back pain and myalgias. Neurological: Negative for dizziness, syncope, weakness, light-headedness and headaches. Physical Exam  Vitals signs and nursing note reviewed.    Constitutional: Musculoskeletal: Normal range of motion. General: No tenderness. Lymphadenopathy:      Head:      Right side of head: No submental, submandibular, tonsillar, preauricular, posterior auricular or occipital adenopathy. Left side of head: No submental, submandibular, tonsillar, preauricular or posterior auricular adenopathy. Cervical: No cervical adenopathy. Right cervical: No superficial, deep or posterior cervical adenopathy. Left cervical: No superficial, deep or posterior cervical adenopathy. Upper Body:      Right upper body: No supraclavicular adenopathy. Left upper body: No supraclavicular adenopathy. Skin:     General: Skin is warm and dry. Coloration: Skin is not jaundiced or pale. Findings: No bruising, erythema, lesion or rash. Neurological:      General: No focal deficit present. Mental Status: He is alert and oriented to person, place, and time. Cranial Nerves: Cranial nerves are intact. No cranial nerve deficit. Sensory: Sensation is intact. Motor: Motor function is intact. Coordination: Coordination is intact. Coordination normal.      Gait: Gait is intact. Deep Tendon Reflexes: Reflexes are normal and symmetric. Psychiatric:         Attention and Perception: Attention and perception normal.         Mood and Affect: Mood and affect normal.         Speech: Speech normal.         Behavior: Behavior normal. Behavior is cooperative. Thought Content:  Thought content normal.         Cognition and Memory: Cognition and memory normal.         Judgment: Judgment normal.         Vitals:    12/18/20 0950   BP: 124/78   Pulse: 94   Resp: 14   Temp: 97 °F (36.1 °C)   TempSrc: Temporal   SpO2: 98%   Weight: 240 lb (108.9 kg)   Height: 5' 8\" (1.727 m)     BP Readings from Last 3 Encounters:   12/18/20 124/78   12/03/20 128/80   09/24/20 121/88              DIAGNOSTIC FINDINGS:  CBC:  Lab Results   Component Value Date

## 2020-12-21 RX ORDER — PEN NEEDLE, DIABETIC 31 GX5/16"
1 NEEDLE, DISPOSABLE MISCELLANEOUS DAILY
Qty: 100 EACH | Refills: 3 | Status: SHIPPED | OUTPATIENT
Start: 2020-12-21 | End: 2021-04-19

## 2021-01-04 ENCOUNTER — HOSPITAL ENCOUNTER (OUTPATIENT)
Dept: DIABETES SERVICES | Age: 66
Setting detail: THERAPIES SERIES
Discharge: HOME | End: 2021-01-04
Payer: COMMERCIAL

## 2021-01-04 DIAGNOSIS — E11.65 TYPE 2 DIABETES MELLITUS WITH HYPERGLYCEMIA, UNSPECIFIED WHETHER LONG TERM INSULIN USE (HCC): Primary | ICD-10-CM

## 2021-01-04 DIAGNOSIS — E11.9 DIABETES MELLITUS, NEW ONSET (HCC): Primary | ICD-10-CM

## 2021-01-04 PROCEDURE — G0108 DIAB MANAGE TRN  PER INDIV: HCPCS

## 2021-01-04 RX ORDER — GLUCOSAMINE HCL/CHONDROITIN SU 500-400 MG
CAPSULE ORAL
Qty: 100 STRIP | Refills: 0 | Status: SHIPPED | OUTPATIENT
Start: 2021-01-04 | End: 2021-01-29

## 2021-01-04 RX ORDER — LANCETS 30 GAUGE
EACH MISCELLANEOUS
Qty: 100 EACH | Refills: 0 | Status: SHIPPED | OUTPATIENT
Start: 2021-01-04 | End: 2021-02-10

## 2021-01-04 RX ORDER — BLOOD-GLUCOSE METER
1 KIT MISCELLANEOUS DAILY
Qty: 1 KIT | Refills: 0 | Status: SHIPPED | OUTPATIENT
Start: 2021-01-04 | End: 2021-07-19

## 2021-01-04 SDOH — ECONOMIC STABILITY: FOOD INSECURITY: ADDITIONAL INFORMATION: NO

## 2021-01-04 NOTE — LETTER
[facility]  [facility] [address]  [facility] [address]  Phone: [phone]         January 4, 2021    To :Regi TATUM PA-C    From: Stefano Altamirano RN    Patient: Janie Sosa   YOB: 1955   Date of Visit: 1/4/21     An initial assessment to determine diabetes education needs was completed. Education plan included:interpretation of lab results, blood sugar goals, complications of diabetes mellitus, hypoglycemia prevention and treatment, exercise, illness management, self-monitoring of blood glucose skills, nutrition, carbohydrate counting, site rotation, use of insulin pen, self-injection of insulin and use of sliding scale/correction formula. An ongoing plan was created to include: follow up via phone call in one week. Thank you for the opportunity to provide Diabetes Self Management Education to your patient.

## 2021-01-04 NOTE — PROGRESS NOTES
Allergies      A1C blood level - at goal < 7%   Lab Results   Component Value Date    LABA1C 10.6 (H) 12/16/2020    LABA1C 6.4 05/21/2019    LABA1C 6.6 03/21/2019     Lab Results   Component Value Date    GLUF 268 (H) 12/16/2020    CREATININE 1.31 (H) 12/16/2020       Blood pressure ( 130/ 80)  Or less  BP Readings from Last 3 Encounters:   12/18/20 124/78   12/03/20 128/80   09/24/20 121/88        Cholesterol ( LDL under  100)   Lab Results   Component Value Date    LDLCHOLESTEROL 132 (H) 12/16/2020       Diabetes Self- Management Education Record    Participant Name: Yvette York  Referring Provider: HERNAN StevensC   Assessment/Evaluation Ratings:  1=Needs Instruction   4=Demonstrates Understanding/Competency  2=Needs Review   NC=Not Covered    3=Comprehends Key Points  N/A=Not Applicable  Topics/Learning Objectives Pre-session Assess Date:  1/4/21rs  Instr. Date Reinforce Date Post- session Eval Comments   Diabetes disease process & Treatment process: Define diabetes & pre-diabetes; Identify own type of diabetes; role of the pancreas; signs/symptoms; diagnostic criteria; prevention & treatment options; contributing factors. 1    - 1/4/21rs-  new dx of type 2 dm with pcp visit 12/2020- has some symptoms of thirst and frequent urination   - 1/4/21rs -  - hx of hep C ( remission) hx of ckd stage 3,  hx if MVR   Incorporating nutritional management into lifestyle: Describe effect of type, amount & timing of food on blood glucose;  Describe basic meal planning techniques & current nutrition guideline   Bk meal at 9 am   Cereal and milk and banana or vale or egg or go mcdonals    YUVAL - orange or sandwithces or skipped    Dn- fz meals pot pies - meat loaf and mash pot  fz pizza   Not big eater or cook    - now retired lives alone and does not cook much     - snacking food cookies - willing to give up       Mostly non caloric fluids like water - some milk - cutting back on juices      What to eat - Food groups, When to eat - timing of meals and snacks, and How much to eat - portions control. calories/ day   CHO choices/ meal   CHO choices/  day   grams of protein /day   gram of fat /day     Correctly read food labels & demonstrate CHO counting & portion control with personalized meal plan. Identify dining out strategies, & dietary sick day guidelines. 1       Incorporating physical activity into lifestyle:   Verbalize effect of exercise on blood glucose levels; benefits of regular exercise; safety considerations; contraindications; maintenance of activity. 1     - 1/4/21 - rs less active now - retired    Using medications safely:  Identify effects of diabetes medicines on blood glucose levels; List diabetes medication taken, action & side effects;    1    1/4/21rs - Started amaryl 2 mg daily in Dec 2020     Insulin / Injectable - Appropriate injection sites; proper storage; supplies needed; proper technique; safe needle disposal guidelines. 1 1/4/21rs    1/4/21rs Starting insulin pens: lantus 20 units  Once daily bedtime and Humalog per correction  ( 2 for 50 over 150 ) per meals meals, demo of insulin pen with return demo and detailed explanation and written plan for insulin - BD getting started booklet     Monitoring blood glucose, interpreting and using results:  Identify recommended & personal blood glucose targets; importance of testing; testing supplies; HgbA1C target levels; Factors affecting blood glucose; Importance of logging blood glucose levels for pattern recognition; ketone testing; safe lancet disposal.   1 1/4/21rs 1/4/21rs New  To BG checks - taught how to use contour next meter with 10 starter pack  - note to PCP to order a meter through the insurance / send to pharmacy  BG today 225    Prevention, detection & treatment of acute complications:  Identify symptoms of hyper & hypoglycemia, and prevention & treatment strategies.     1    -- 1/4/21rs  reviewed how insulin works and risk for low BG with insulin and rule of 15 reviewed -    Describe sick day guidelines & indications for  physician notification. Identify short term consequences of poor control. Disaster preparedness strategies    1       Prevention, detection & treatment of chronic complications:  Define the natural course of diabetes & describe the relationship of blood glucose levels to long term complications of diabetes. Identify preventative measures & standards of care. 1    1/4/21rs  - has PCP care every 6 mo - kidney md every 6 mo - cardio for MVR and cholesterol every year - eye Md every 3 - 6 months ( has issue with left eye may - aug 2020)   - hep c in remission does not see liver md now -    Developing strategies to address psychosocial issues:  Describe feelings about living with diabetes; Describe how stress, depression & anxiety affect blood glucose; Identify coping strategies; Identify support needed & support network available. 1    1/4/21rs - ID a lot of info to learn with new skills for BG and insulin use as well at dx of dm    Developing strategies to promote health/change behavior: Identify 7 self-care behaviors; Personal health risk factors; Benefits, challenges & strategies for behavioral change;    1         Individualized goal selection. My goal , to help me improve my health, I will:   1. Healthy eating - Use plate method for balance / eat less starches and more veggies  - avoid skipping meals      2. Monitoring- start to check BG fasting and pre meal - log BG and insulin use in log book/ home note book    3. Medications - start to take insulins as rx plan - lantus and humalog        Plan  Follow-up Appointments planned in individual setting. Next Appointment in 1 weeks. Follow up planned via phone call, patient does not have computer at home.  Given class 1 and 2 folder on 1/4/21RS     Instruction Method: [x]Lecture/Discussion  []Power Point Presentation  []Handouts  []Return Demonstration      Education Materials/Equipment Provided:    [x]Self-Management - Initial assessment - Enrolment in to ADA  Where do I Begin, Living with Type 2 diabetes\"  ADA home support program and handout for no concentrated sweets and diet meal planning basics, handout on diabetes education classes. []Self-Management  Class 1 - \"How to Thrive: A guide for Your journey with Diabetes\" - ADA booklet 2020  - pages 4, 11- 15 , 18 -19     [] Self-Management  Class 2 - Meal Plan and handout for serving sizes, smarter snacking, Ready Set Carb Counting / Plate Method, Nutrient Conversion and International [address] Eating for People with Diabetes and Nutrition in the [employer] Resources - fast facts about fast food -How to Thrive: A guide for Your journey with Diabetes\" - ADA booklet 2020  - pages 12 -17    [] Self-Management  Class 3 -  Diabetes ID card,  foot care tips sheet,  Individualized Diabetes report card - \"How to Thrive: A guide for Your journey with Diabetes\" - ADA booklet 2020 page 6- 9  and 20 - 35    [] Self-Management Class 4 - BD Booklet  Sick Day Rules and  [address] , recipe hand outs and tips, diabetes Cookbooks  ( when available) How to Thrive: A guide for Your journey with Diabetes\" - ADA booklet 2020  - pages 39 -39     []Self-Management -  Self-Management - 3 month follow -  AADE7 Self care behaviors work sheets,  Online resource list - March 2020  and   How to Thrive: A guide for Your journey with Diabetes\" - ADA booklet 2020  - pages 39. []Self-Management  Gestational - RN class -Gestational Diabetes Mellitus ( GDM) toolkit form [place] gestational diabetes postpartum care learning collaborative 2018.    Gestational diabetes handout from [facility] jan 2016  \"Simple Guidelines for meal planning with gestational diabetes\" handout 3 meals and 3 snacks  SMBG sheets to fax back to MFM weekly  BD  healthy injection site selection and rotation with 6 mm insulin syringe and 4 mm pen needle  Did you have gestational diabetes when you were pregnant? Handout from [facility]  April 2014    []Self-Management Gestational - RD class - My Food Plan for Gestational diabetes    []Glucose Meter     []Insulin Kit     []Other      Encounter Type Date Start Time End Time Comments No Show Dates   Assessment 1/4/21rs    10 39  1200    [x]In Person  []Telephone    Class 1 - Understanding diabetes         Class 2- Nutrition and diabetes          Class 3 - Preventing Complications         Class 4 -  In depth Nutrition and sick day care        Class 5 - 3 month follow up / goal reassessment        Gestational - RN         Gestational - RD        Individual MNT         Shared Med Appt         Yearly Follow-up        Meter Instrx 1/4/21rs        Insulin Instrx 1/4/21rs      [x]Pen  []Vial & Syringe      DSMS Support :   [] MNT      [] Annual update     [] Starting Fresh  adults living with diabetes or pre diabetes. [address] [address] [phone] call for dates    []  Diabetes Group at  [address] [place] - Free 6 week diabetes education support   classes - use web site interest form found at  Jamn.pt - to enroll       []ADA  Where do I Begin, Living with Type 2 diabetes ADA home support program  Web site: diabetes. org/living    Call: 1800 DIABETES  e-mail: Bjorn@[email or web address]. org     []  Internet web sites - ADAWeb site: diabetes. org and diabetesfoodhub.org      Post Education Referrals:      [] [facility] information sheet and [address] [facility] , 21       [] Dental care - Dental care of [facility]     [] [facility] link  phone number - for information and referral to [facility]  Clinically  [address] [facility], WEIGHT MANAGEMENT        []Other  Kyle Bautista, LATOSHA

## 2021-01-11 ENCOUNTER — HOSPITAL ENCOUNTER (OUTPATIENT)
Dept: DIABETES SERVICES | Age: 66
Setting detail: THERAPIES SERIES
Discharge: HOME | End: 2021-01-11
Payer: COMMERCIAL

## 2021-01-11 PROCEDURE — G0108 DIAB MANAGE TRN  PER INDIV: HCPCS

## 2021-01-11 NOTE — PROGRESS NOTES
Diabetes Self- Management Education Program Assessment -   Also see Diabetic Screening  Patient, Milton Jerze,  here for diabetes self-management education  visit/ assessment. Today's visit was in an individual setting. MEDICAL HISTORY:  Past Medical History:   Diagnosis Date    Hepatitis C     Hypertension     Liver disease     sees GI, treated for Hep C     Non-rheumatic mitral regurgitation     sees cardio,    Renal insufficiency 6/26/2018    Severe obesity (BMI 35.0-39.9) 9/11/2018    Ventricular tachycardia ([address]      Family History   Problem Relation Age of Onset    Breast Cancer Mother     Cancer Mother     Coronary Art Dis Father         fatal MI 72     Patient has no known allergies. Immunization History   Administered Date(s) Administered    Influenza, Quadv, IM, (6 mo and older Fluzone, Flulaval, Fluarix and 3 yrs and older Afluria) 10/23/2018    Influenza, Quadv, IM, PF (6 mo and older Fluzone, Flulaval, Fluarix, and 3 yrs and older Afluria) 11/21/2019, 09/24/2020    Pneumococcal Polysaccharide ([id number]) 12/11/2018    Tdap (Boostrix, Adacel) 07/06/2018     Current Medications  Current Outpatient Medications   Medication Sig Dispense Refill    glucose monitoring kit (FREESTYLE) monitoring kit 1 kit by Does not apply route daily 1 kit 0    Lancets MISC Use one Lancet per blood sugar test 100 each 0    blood glucose monitor strips Test 3 times a day & as needed for symptoms of irregular blood glucose.  100 strip 0    Insulin Pen Needle ([employer] PEN NEEDLES 31G) 31G X 8 MM MISC 1 each by Does not apply route daily 100 each 3    atorvastatin (LIPITOR) 20 MG tablet Take 1 tablet by mouth daily 30 tablet 3    glimepiride (AMARYL) 2 MG tablet Take 1 tablet by mouth every morning 30 tablet 3    insulin glargine (LANTUS SOLOSTAR) 100 UNIT/ML injection pen Inject 20 Units into the skin nightly 5 pen 3    insulin lispro (HUMALOG KWIKPEN) 200 UNIT/ML SOPN pen 0-150=0 units  151-200=2 units  201-250=4 units  251-300=6 units   301-350= 8 units  351+ = 10 units 2 pen 3    metoprolol tartrate (LOPRESSOR) 25 MG tablet take 1 tablet by mouth twice a day       No current facility-administered medications for this encounter.    :     Comments:  Allergies:  No Known Allergies      A1C blood level - at goal < 7%   Lab Results   Component Value Date    LABA1C 10.6 (H) 12/16/2020    LABA1C 6.4 05/21/2019    LABA1C 6.6 03/21/2019     Lab Results   Component Value Date    GLUF 268 (H) 12/16/2020    CREATININE 1.31 (H) 12/16/2020       Blood pressure ( 130/ 80)  Or less  BP Readings from Last 3 Encounters:   12/18/20 124/78   12/03/20 128/80   09/24/20 121/88        Cholesterol ( LDL under  100)   Lab Results   Component Value Date    LDLCHOLESTEROL 132 (H) 12/16/2020       Diabetes Self- Management Education Record    Participant Name: Sadaf Diamond  Referring Provider: Khushboo TATUM PA-C   Assessment/Evaluation Ratings:  1=Needs Instruction   4=Demonstrates Understanding/Competency  2=Needs Review   NC=Not Covered    3=Comprehends Key Points  N/A=Not Applicable  Topics/Learning Objectives Pre-session Assess Date:  1/4/21rs  Instr. Date Reinforce Date Post- session Eval Comments   Diabetes disease process & Treatment process: Define diabetes & pre-diabetes; Identify own type of diabetes; role of the pancreas; signs/symptoms; diagnostic criteria; prevention & treatment options; contributing factors. 1 1/11/21rs    - 1/4/21rs-  new dx of type 2 dm with pcp visit 12/2020- has some symptoms of thirst and frequent urination   - 1/4/21rs -  - hx of hep C ( remission) hx of ckd stage 3,  hx if MVR   Incorporating nutritional management into lifestyle: Describe effect of type, amount & timing of food on blood glucose;  Describe basic meal planning techniques & current nutrition guideline   Bk meal at 9 am   Cereal and milk and banana or vale or egg or go mcdonals    YUVAL - orange or sandwithces or skipped    Dn- fz meals pot pies - meat loaf and mash pot  fz pizza   Not big eater or cook    - now retired lives alone and does not cook much     - snacking food cookies - willing to give up       Mostly non caloric fluids like water - some milk - cutting back on juices      -1/11/21rs   Stated he got rid of cookies and junk food in home   [place] ([facility]) lunch meat sandwiches  Go to deli meals   Now drinking more water     What to eat - Food groups, When to eat - timing of meals and snacks, and How much to eat - portions control. calories/ day   CHO choices/ meal   CHO choices/  day   grams of protein /day   gram of fat /day     Correctly read food labels & demonstrate CHO counting & portion control with personalized meal plan. Identify dining out strategies, & dietary sick day guidelines. 1       Incorporating physical activity into lifestyle:   Verbalize effect of exercise on blood glucose levels; benefits of regular exercise; safety considerations; contraindications; maintenance of activity. 1 1/11/21rs     - 1/4/21 - rs less active now - retired   -1/11/21 - staying active in home - stated like to ride bike - and [place]     Using medications safely:  Identify effects of diabetes medicines on blood glucose levels; List diabetes medication taken, action & side effects;    1    1/4/21rs - Started amaryl 2 mg daily in Dec 2020     Insulin / Injectable - Appropriate injection sites; proper storage; supplies needed; proper technique; safe needle disposal guidelines.    1 1/4/21rs    1/4/21rs Starting insulin pens: lantus 20 units  Once daily bedtime and Humalog per correction  ( 2 for 50 over 150 ) per meals meals, demo of insulin pen with return demo and detailed explanation and written plan for insulin - BD getting started booklet     Monitoring blood glucose, interpreting and using results:  Identify recommended & personal blood glucose targets; importance of testing; testing supplies; HgbA1C target levels; Factors affecting blood glucose; Importance of logging blood glucose levels for pattern recognition; ketone testing; safe lancet disposal.   1 1/4/21rs    1/4/21rs New  To BG checks - taught how to use contour next meter with 10 starter pack  - note to PCP to order a meter through the insurance / send to pharmacy  BG today 225     - new freestyle meter meter at [facility] :  - fasting  - 112 - 117 - 128 - 134 - 160 ( with 20 units lantus)   - only needed correction scale - X 2 this week   Prevention, detection & treatment of acute complications:  Identify symptoms of hyper & hypoglycemia, and prevention & treatment strategies. 1 1/11/21rs   -- 1/4/21rs  reviewed how insulin works and risk for low BG with insulin and rule of 15 reviewed -    Describe sick day guidelines & indications for  physician notification. Identify short term consequences of poor control. Disaster preparedness strategies    1    --1/11/21 - discussed sick day care and risk of  High blood     Prevention, detection & treatment of chronic complications:  Define the natural course of diabetes & describe the relationship of blood glucose levels to long term complications of diabetes. Identify preventative measures & standards of care. 1    1/4/21rs  - has PCP care every 6 mo - kidney md every 6 mo - cardio for MVR and cholesterol every year - eye Md every 3 - 6 months ( has issue with left eye may - aug 2020)   - hep c in remission does not see liver md now -   1/11/21stated he will get covid -23  - shot  When available stated his name is on the list   Developing strategies to address psychosocial issues:  Describe feelings about living with diabetes; Describe how stress, depression & anxiety affect blood glucose; Identify coping strategies; Identify support needed & support network available.  1    1/4/21rs - ID a lot of info to learn with new skills for BG and insulin use as well at dx of dm    Developing strategies to promote health/change behavior: Identify 7 self-care behaviors; Personal health risk factors; Benefits, challenges & strategies for behavioral change;    1         Individualized goal selection. My goal , to help me improve my health, I will:   1. Healthy eating - Use plate method for balance / eat less starches and more veggies  - avoid skipping meals      2. Monitoring- start to check BG fasting and pre meal - log BG and insulin use in log book/ home note book    3. Medications - start to take insulins as rx plan - lantus and humalog        Plan  Follow-up Appointments planned in individual setting. Next Appointment in 1 weeks. Follow up planned via phone call, patient does not have computer at home. Given class 1 and 2 folder on 1/4/21RS     Instruction Method: [x]Lecture/Discussion  []Power Point Presentation  []Handouts  []Return Demonstration      Education Materials/Equipment Provided:    [x]Self-Management - Initial assessment - Enrolment in to ADA  Where do I Begin, Living with Type 2 diabetes\"  ADA home support program and handout for no concentrated sweets and diet meal planning basics, handout on diabetes education classes.      [x]Self-Management  Class 1 - \"How to Thrive: A guide for Your journey with Diabetes\" - ADA booklet 2020  - pages 4, 11- 15 , 18 -19 -- 1/11/21rs    [] Self-Management  Class 2 - Meal Plan and handout for serving sizes, smarter snacking, Ready Set Carb Counting / Plate Method, Nutrient Conversion and International [address] Eating for People with Diabetes and Nutrition in the [employer] Resources - fast facts about fast food -How to Thrive: A guide for Your journey with Diabetes\" - ADA booklet 2020  - pages 16 -17    [] Self-Management  Class 3 -  Diabetes ID card,  foot care tips sheet,  Individualized Diabetes report card - \"How to Thrive: A guide for Your journey with Diabetes\" - ADA booklet 2020 page 6- 9  and 20 - 35    [] Self-Management Class 4 - BD Booklet  Sick Day [place] and  Dinning Out Guide , recipe hand outs and tips, diabetes Cookbooks  ( when available) How to Thrive: A guide for Your journey with Diabetes\" - ADA booklet 2020  - pages 36 -39     []Self-Management -  Self-Management - 3 month follow -  AADE7 Self care behaviors work sheets,  Online resource list - March 2020  and   How to Thrive: A guide for Your journey with Diabetes\" - ADA booklet 2020  - pages 39. []Self-Management  Gestational - RN class -Gestational Diabetes Mellitus ( GDM) toolkit form [place] gestational diabetes postpartum care learning collaborative 2018. Gestational diabetes handout from [facility] jan 2016  \"Simple Guidelines for meal planning with gestational diabetes\" handout 3 meals and 3 snacks  SMBG sheets to fax back to MFM weekly  BD  healthy injection site selection and rotation with 6 mm insulin syringe and 4 mm pen needle  Did you have gestational diabetes when you were pregnant? Handout from [facility]  April 2014    []Self-Management Gestational - RD class - My Food Plan for Gestational diabetes    []Glucose Meter     []Insulin Kit     []Other      Encounter Type Date Start Time End Time Comments No Show Dates   Assessment 1/4/21rs    10 39  1200    [x]In Person  []Telephone    Class 1 - Understanding diabetes 1/11/21rs  1100  1200  x    Class 2- Nutrition and diabetes          Class 3 - Preventing Complications         Class 4 -  In depth Nutrition and sick day care        Class 5 - 3 month follow up / goal reassessment        Gestational - RN         Gestational - RD        Individual MNT         Shared Med Appt         Yearly Follow-up        Meter Instrx 1/4/21rs        Insulin Instrx 1/4/21rs      [x]Pen  []Vial & Syringe      DSMS Support :   [] MNT      [] Annual update     [] Starting Fresh  adults living with diabetes or pre diabetes.  [address] [address] [address] call for dates    []  Diabetes Group at  [facility] 6 week diabetes education support   classes - use web site interest form found at  [employer].pt - to enroll       []ADA  Where do I Begin, Living with Type 2 diabetes ADA home support program  Web site: diabetes. org/living    Call: 1800 DIABETES  e-mail: [email or web address]     []  Internet web sites - ADAWeb site: diabetes. org and diabetesfoodhub.org      Post Education Referrals:      [] [facility] information sheet and [address] [facility] , 21       [] Dental care - Dental care of [facility]     [] [facility] link  phone number - for information and referral to [facility]  Clinically  [address] [facility], WEIGHT MANAGEMENT        []Other  Sharyn [place], RN CDE

## 2021-01-18 ENCOUNTER — OFFICE VISIT (OUTPATIENT)
Dept: FAMILY MEDICINE CLINIC | Age: 66
End: 2021-01-18
Payer: COMMERCIAL

## 2021-01-18 VITALS
HEART RATE: 82 BPM | TEMPERATURE: 97.3 F | WEIGHT: 241.2 LBS | OXYGEN SATURATION: 96 % | SYSTOLIC BLOOD PRESSURE: 120 MMHG | DIASTOLIC BLOOD PRESSURE: 82 MMHG | BODY MASS INDEX: 36.56 KG/M2 | HEIGHT: 68 IN

## 2021-01-18 DIAGNOSIS — E11.9 DIABETES MELLITUS, NEW ONSET (HCC): Primary | ICD-10-CM

## 2021-01-18 DIAGNOSIS — Z23 NEED FOR PROPHYLACTIC VACCINATION AND INOCULATION AGAINST VARICELLA: ICD-10-CM

## 2021-01-18 PROCEDURE — 2022F DILAT RTA XM EVC RTNOPTHY: CPT | Performed by: PHYSICIAN ASSISTANT

## 2021-01-18 PROCEDURE — 3046F HEMOGLOBIN A1C LEVEL >9.0%: CPT | Performed by: PHYSICIAN ASSISTANT

## 2021-01-18 PROCEDURE — 3017F COLORECTAL CA SCREEN DOC REV: CPT | Performed by: PHYSICIAN ASSISTANT

## 2021-01-18 PROCEDURE — G8482 FLU IMMUNIZE ORDER/ADMIN: HCPCS | Performed by: PHYSICIAN ASSISTANT

## 2021-01-18 PROCEDURE — G8427 DOCREV CUR MEDS BY ELIG CLIN: HCPCS | Performed by: PHYSICIAN ASSISTANT

## 2021-01-18 PROCEDURE — 99213 OFFICE O/P EST LOW 20 MIN: CPT | Performed by: PHYSICIAN ASSISTANT

## 2021-01-18 PROCEDURE — 1036F TOBACCO NON-USER: CPT | Performed by: PHYSICIAN ASSISTANT

## 2021-01-18 PROCEDURE — G8417 CALC BMI ABV UP PARAM F/U: HCPCS | Performed by: PHYSICIAN ASSISTANT

## 2021-01-18 RX ORDER — BLOOD-GLUCOSE METER
KIT MISCELLANEOUS
COMMUNITY
Start: 2021-01-04 | End: 2021-07-19

## 2021-01-18 RX ORDER — PEN NEEDLE, DIABETIC 31 GX5/16"
NEEDLE, DISPOSABLE MISCELLANEOUS
COMMUNITY
Start: 2020-12-21 | End: 2021-01-18

## 2021-01-18 RX ORDER — INSULIN GLARGINE 100 [IU]/ML
15 INJECTION, SOLUTION SUBCUTANEOUS NIGHTLY
Qty: 5 PEN | Refills: 3 | Status: SHIPPED | OUTPATIENT
Start: 2021-01-18 | End: 2021-04-19 | Stop reason: SDUPTHER

## 2021-01-18 ASSESSMENT — PATIENT HEALTH QUESTIONNAIRE - PHQ9
SUM OF ALL RESPONSES TO PHQ QUESTIONS 1-9: 0
SUM OF ALL RESPONSES TO PHQ QUESTIONS 1-9: 0

## 2021-01-18 ASSESSMENT — ENCOUNTER SYMPTOMS
GASTROINTESTINAL NEGATIVE: 1
CHEST TIGHTNESS: 0
WHEEZING: 0

## 2021-01-18 NOTE — PROGRESS NOTES
exam  1967    Shingles Vaccine (1 of 2) 2007    Low dose CT lung screening  2012       No Known Allergies      Current Outpatient Medications   Medication Sig Dispense Refill    Blood Glucose Monitoring Suppl (FREESTYLE LITE) ELE use as directed      zoster recombinant adjuvanted vaccine (SHINGRIX) 50 MCG/0.5ML SUSR injection Inject 0.5 mLs into the muscle once for 1 dose 50 MCG IM then repeat 2-6 months. 1 each 1    insulin glargine (LANTUS SOLOSTAR) 100 UNIT/ML injection pen Inject 15 Units into the skin nightly 5 pen 3    glucose monitoring kit (FREESTYLE) monitoring kit 1 kit by Does not apply route daily 1 kit 0    Lancets MISC Use one Lancet per blood sugar test 100 each 0    blood glucose monitor strips Test 3 times a day & as needed for symptoms of irregular blood glucose. 100 strip 0    Insulin Pen Needle ([employer] PEN NEEDLES 31G) 31G X 8 MM MISC 1 each by Does not apply route daily 100 each 3    atorvastatin (LIPITOR) 20 MG tablet Take 1 tablet by mouth daily 30 tablet 3    glimepiride (AMARYL) 2 MG tablet Take 1 tablet by mouth every morning 30 tablet 3    insulin lispro (HUMALOG KWIKPEN) 200 UNIT/ML SOPN pen 0-150=0 units  151-200=2 units  201-250=4 units  251-300=6 units   301-350= 8 units  351+ = 10 units 2 pen 3    metoprolol tartrate (LOPRESSOR) 25 MG tablet take 1 tablet by mouth twice a day       No current facility-administered medications for this visit. Social History     Tobacco Use    Smoking status: Former Smoker     Packs/day: 1.50     Years: 40.00     Pack years: 60.00     Start date: 1977     Quit date: 2015     Years since quittin.7    Smokeless tobacco: Never Used    Tobacco comment: quit     Substance Use Topics    Alcohol use:  Yes     Alcohol/week: 7.0 standard drinks     Types: 7 Cans of beer per week     Comment: 7 beers/w; ; prev drinking 3-4 X/wk, apprx 40 y   [place] Drug use: No       Family History   Problem Relation Age of Onset    Breast Cancer Mother     Cancer Mother     Coronary Art Dis Father         fatal MI 72        Review of Systems   Constitutional: Negative for appetite change and fatigue. Respiratory: Negative for chest tightness and wheezing. Cardiovascular: Negative for chest pain and palpitations. Gastrointestinal: Negative. Neurological: Negative. Physical Exam  Vitals signs and nursing note reviewed. Constitutional:       Appearance: Normal appearance. Cardiovascular:      Rate and Rhythm: Normal rate and regular rhythm. Heart sounds: Normal heart sounds. Skin:     General: Skin is warm and dry. Neurological:      General: No focal deficit present. Mental Status: He is alert and oriented to person, place, and time. Psychiatric:         Mood and Affect: Mood normal.         Behavior: Behavior normal.         Thought Content: Thought content normal.         Judgment: Judgment normal.           Vitals:    01/18/21 0840   BP: 120/82   Pulse: 82   Temp: 97.3 °F (36.3 °C)   SpO2: 96%   Weight: 241 lb 3.2 oz (109.4 kg)   Height: 5' 8\" (1.727 m)     BP Readings from Last 3 Encounters:   01/18/21 120/82   12/18/20 124/78   12/03/20 128/80              DIAGNOSTIC FINDINGS:  CBC:  Lab Results   Component Value Date    WBC 7.6 12/16/2020    HGB 17.5 12/16/2020     12/16/2020       BMP:    Lab Results   Component Value Date     12/16/2020    K 4.2 12/16/2020     12/16/2020    CO2 20 12/16/2020    BUN 21 12/16/2020    CREATININE 1.31 12/16/2020    GLUCOSE 283 11/30/2020    GLUCOSE 258 06/23/2020         FASTING LIPID PANEL:  Lab Results   Component Value Date    CHOL 217 (H) 09/05/2018    HDL 27 (L) 12/16/2020    TRIG 284 (H) 09/05/2018       No results found for this visit on 01/18/21. ASSESSMENT AND PLAN:   Diagnosis Orders   1. Diabetes mellitus, new onset ([address]   crease Lantus to 15 units at nighttime and continue sliding scale.   Continue monitoring blood sugars closely and report them to the office if they are running high or low. Continue diet modifications   2. Need for prophylactic vaccination and inoculation against varicella  zoster recombinant adjuvanted vaccine (SHINGRIX) 50 MCG/0.5ML SUSR injection       FOLLOW UP AND INSTRUCTIONS:  · Return in about 3 months (around 4/18/2021), or if symptoms worsen or fail to improve. · Discussed use, benefit, and side effects of prescribed medications. Barriers to medication compliance addressed. All patient questions answered. Pt voiced understanding. · Patient instructed to return to the office if symptoms do not resolve or go directly to the ER if the symptoms worsen - patient voiced understanding. · Patient given educational materials - see patient instructions    [facility]  1/18/2021, 9:20 AM    This note is created with the assistance of a speech-recognition program. While intending to generate a document that actually reflects the content of the visit, the document can still have some mistakes which may not have been identified and corrected by editing.

## 2021-01-20 ENCOUNTER — HOSPITAL ENCOUNTER (OUTPATIENT)
Dept: DIABETES SERVICES | Age: 66
Setting detail: THERAPIES SERIES
Discharge: HOME | End: 2021-01-20
Payer: COMMERCIAL

## 2021-01-20 DIAGNOSIS — E11.65 TYPE 2 DIABETES MELLITUS WITH HYPERGLYCEMIA, UNSPECIFIED WHETHER LONG TERM INSULIN USE (HCC): Primary | ICD-10-CM

## 2021-01-20 PROCEDURE — G0108 DIAB MANAGE TRN  PER INDIV: HCPCS

## 2021-01-20 NOTE — PROGRESS NOTES
and older Afluria) 10/23/2018    Influenza, Quadv, IM, PF (6 mo and older Fluzone, Flulaval, Fluarix, and 3 yrs and older Afluria) 11/21/2019, 09/24/2020    Pneumococcal Polysaccharide ([id number]) 12/11/2018    Tdap (Boostrix, Adacel) 07/06/2018     Current Medications  Current Outpatient Medications   Medication Sig Dispense Refill    Blood Glucose Monitoring Suppl (FREESTYLE LITE) ELE use as directed      insulin glargine (LANTUS SOLOSTAR) 100 UNIT/ML injection pen Inject 15 Units into the skin nightly 5 pen 3    glucose monitoring kit (FREESTYLE) monitoring kit 1 kit by Does not apply route daily 1 kit 0    Lancets MISC Use one Lancet per blood sugar test 100 each 0    blood glucose monitor strips Test 3 times a day & as needed for symptoms of irregular blood glucose.  100 strip 0    Insulin Pen Needle ([employer] PEN NEEDLES 31G) 31G X 8 MM MISC 1 each by Does not apply route daily 100 each 3    atorvastatin (LIPITOR) 20 MG tablet Take 1 tablet by mouth daily 30 tablet 3    glimepiride (AMARYL) 2 MG tablet Take 1 tablet by mouth every morning 30 tablet 3    insulin lispro (HUMALOG KWIKPEN) 200 UNIT/ML SOPN pen 0-150=0 units  151-200=2 units  201-250=4 units  251-300=6 units   301-350= 8 units  351+ = 10 units 2 pen 3    metoprolol tartrate (LOPRESSOR) 25 MG tablet take 1 tablet by mouth twice a day       No current facility-administered medications for this encounter.    :     Comments:  Allergies:  No Known Allergies      A1C blood level - at goal < 7%   Lab Results   Component Value Date    LABA1C 10.6 (H) 12/16/2020    LABA1C 6.4 05/21/2019    LABA1C 6.6 03/21/2019     Lab Results   Component Value Date    GLUF 268 (H) 12/16/2020    CREATININE 1.31 (H) 12/16/2020       Blood pressure ( 130/ 80)  Or less  BP Readings from Last 3 Encounters:   01/18/21 120/82   12/18/20 124/78   12/03/20 128/80        Cholesterol ( LDL under  100)   Lab Results   Component Value Date    LDLCHOLESTEROL 132 (H) choices/  day   grams of protein /day   gram of fat /day     Correctly read food labels & demonstrate CHO counting & portion control with personalized meal plan. Identify dining out strategies, & dietary sick day guidelines. 1 1/20/21 JW      Incorporating physical activity into lifestyle:   Verbalize effect of exercise on blood glucose levels; benefits of regular exercise; safety considerations; contraindications; maintenance of activity. 1 1/11/21rs     - 1/4/21 - rs less active now - retired   -1/11/21 - staying active in home - stated like to ride bike - and [place]     Using medications safely:  Identify effects of diabetes medicines on blood glucose levels; List diabetes medication taken, action & side effects;    1    1/4/21rs - Started amaryl 2 mg daily in Dec 2020     Insulin / Injectable - Appropriate injection sites; proper storage; supplies needed; proper technique; safe needle disposal guidelines. 1 1/4/21rs 1/4/21rs Starting insulin pens: lantus 20 units  Once daily bedtime and Humalog per correction  ( 2 for 50 over 150 ) per meals meals, demo of insulin pen with return demo and detailed explanation and written plan for insulin - BD getting started booklet     Monitoring blood glucose, interpreting and using results:  Identify recommended & personal blood glucose targets; importance of testing; testing supplies; HgbA1C target levels; Factors affecting blood glucose;  Importance of logging blood glucose levels for pattern recognition; ketone testing; safe lancet disposal.   1 1/4/21rs    1/4/21rs New  To BG checks - taught how to use contour next meter with 10 starter pack  - note to PCP to order a meter through the insurance / send to pharmacy  BG today 225     - new freestyle meter meter at [facility] :  - fasting  - 112 - 117 - 128 - 134 - 160 ( with 20 units lantus)   - only needed correction scale - X 2 this week   Prevention, detection & treatment of acute complications: Identify symptoms of hyper & hypoglycemia, and prevention & treatment strategies. 1 1/11/21rs   -- 1/4/21rs  reviewed how insulin works and risk for low BG with insulin and rule of 15 reviewed -    Describe sick day guidelines & indications for  physician notification. Identify short term consequences of poor control. Disaster preparedness strategies    1    --1/11/21 - discussed sick day care and risk of  High blood     Prevention, detection & treatment of chronic complications:  Define the natural course of diabetes & describe the relationship of blood glucose levels to long term complications of diabetes. Identify preventative measures & standards of care. 1    1/4/21rs  - has PCP care every 6 mo - kidney md every 6 mo - cardio for MVR and cholesterol every year - eye Md every 3 - 6 months ( has issue with left eye may - aug 2020)   - hep c in remission does not see liver md now -   1/11/21stated he will get covid -23  - shot  When available stated his name is on the list   Developing strategies to address psychosocial issues:  Describe feelings about living with diabetes; Describe how stress, depression & anxiety affect blood glucose; Identify coping strategies; Identify support needed & support network available. 1    1/4/21rs - ID a lot of info to learn with new skills for BG and insulin use as well at dx of dm    Developing strategies to promote health/change behavior: Identify 7 self-care behaviors; Personal health risk factors; Benefits, challenges & strategies for behavioral change;    1         Individualized goal selection. My goal , to help me improve my health, I will:   1. Healthy eating - Use plate method for balance / eat less starches and more veggies  - avoid skipping meals      2. Monitoring- start to check BG fasting and pre meal - log BG and insulin use in log book/ home note book    3.  Medications - start to take insulins as rx plan - lantus and humalog        Plan  Follow-up Appointments planned in individual setting. Next Appointment in 1 weeks. Follow up planned via phone call, patient does not have computer at home. Given class 1 and 2 folder on 1/4/21RS     Instruction Method: [x]Lecture/Discussion  []Power Point Presentation  []Handouts  []Return Demonstration      Education Materials/Equipment Provided:    [x]Self-Management - Initial assessment - Enrolment in to ADA  Where do I Begin, Living with Type 2 diabetes\"  ADA home support program and handout for no concentrated sweets and diet meal planning basics, handout on diabetes education classes. [x]Self-Management  Class 1 - \"How to Thrive: A guide for Your journey with Diabetes\" - ADA booklet 2020  - pages 4, 11- 15 , 18 -19 -- 1/11/21rs    [x] Self-Management  Class 2 - Meal Plan and handout for serving sizes, smarter snacking, Ready Set Carb Counting / Plate Method, Nutrient Conversion and International [address] Eating for People with Diabetes and Nutrition in the [employer] - fast facts about fast food -How to Thrive: A guide for Your journey with Diabetes\" - ADA booklet 2020  - pages 12 -171/20/21 JW    [] Self-Management  Class 3 -  Diabetes ID card,  foot care tips sheet,  Individualized Diabetes report card - \"How to Thrive: A guide for Your journey with Diabetes\" - ADA booklet 2020 page 6- 9  and 20 - 35    [] Self-Management Class 4 - BD Booklet  Sick Day Rules and  [address] , recipe hand outs and tips, diabetes Cookbooks  ( when available) How to Thrive: A guide for Your journey with Diabetes\" - ADA booklet 2020  - pages 39 -39     []Self-Management -  Self-Management - 3 month follow -  AADE7 Self care behaviors work sheets,  Online resource list - March 2020  and   How to Thrive: A guide for Your journey with Diabetes\" - ADA booklet 2020  - pages 39.     []Self-Management  Gestational - RN class -Gestational Diabetes Mellitus ( GDM) toolkit form [place] gestational diabetes postpartum care learning collaborative 2018. Gestational diabetes handout from [facility] jan 2016  \"Simple Guidelines for meal planning with gestational diabetes\" handout 3 meals and 3 snacks  SMBG sheets to fax back to MFM weekly  BD  healthy injection site selection and rotation with 6 mm insulin syringe and 4 mm pen needle  Did you have gestational diabetes when you were pregnant? Handout from [facility]  April 2014    []Self-Management Gestational - RD class - My Food Plan for Gestational diabetes    []Glucose Meter     []Insulin Kit     []Other      Encounter Type Date Start Time End Time Comments No Show Dates   Assessment 1/4/21rs    10 39  1200    [x]In Person  []Telephone    Class 1 - Understanding diabetes 1/11/21rs  1100  1200  x    Class 2- Nutrition and diabetes   1/20/21 JW 9:00 10:00 x    Class 3 - Preventing Complications         Class 4 -  In depth Nutrition and sick day care        Class 5 - 3 month follow up / goal reassessment        Gestational - RN         Gestational - RD        Individual MNT         Shared Med Appt         Yearly Follow-up        Meter Instrx 1/4/21rs        Insulin Instrx 1/4/21rs      [x]Pen  []Vial & Syringe      DSMS Support :   [] MNT      [] Annual update     [] Starting Fresh  adults living with diabetes or pre diabetes. [address] [place] [address] [phone] call for dates    []  Diabetes Group at  [place] [id number] of [place] - Free 6 week diabetes education support   classes - use web site interest form found at  Cellca.pt - to enroll       []ADA  Where do I Begin, Living with Type 2 diabetes ADA home support program  Web site: diabetes. org/living    Call: 1800 DIABETES  e-mail: Malinda@[email or web address]. org     []  Internet web sites - ADAWeb site: diabetes. org and diabetesfoodhub.org      Post Education Referrals:      [] [place] Tobacco Quit information sheet and [address] [facility] , [address]      [] Dental care - [facility] of [facility]     [] [facility] ([facility]) link  phone number - for information and referral to [address], WOUND, WEIGHT MANAGEMENT        []Other  Cristhian Mccray, ISHA, LD CDE

## 2021-01-25 ENCOUNTER — TELEPHONE (OUTPATIENT)
Dept: FAMILY MEDICINE CLINIC | Age: 66
End: 2021-01-25

## 2021-01-25 ENCOUNTER — HOSPITAL ENCOUNTER (OUTPATIENT)
Dept: DIABETES SERVICES | Age: 66
Setting detail: THERAPIES SERIES
Discharge: HOME | End: 2021-01-25
Payer: COMMERCIAL

## 2021-01-25 PROCEDURE — G0108 DIAB MANAGE TRN  PER INDIV: HCPCS

## 2021-01-25 RX ORDER — INSULIN LISPRO 200 [IU]/ML
INJECTION, SOLUTION SUBCUTANEOUS
Qty: 2 PEN | Refills: 3 | Status: SHIPPED | OUTPATIENT
Start: 2021-01-25 | End: 2021-04-19 | Stop reason: SDUPTHER

## 2021-01-25 NOTE — PROGRESS NOTES
This visit is a TeleHealth encounter (During TBRSV-71 public health emergency). Pursuant to the emergency declaration under the [facility], [address] [place] [address] and the [employer] and [employer] Act, this Virtual Visit was conducted with patient's (and/or legal guardian's) consent, to reduce the patient's risk of exposure to COVID-19 and provide necessary medical care. The patient (and/or legal guardian) has also been advised to contact this office for worsening conditions or problems, and seek emergency medical treatment and/or call 911 if deemed necessary. Patient identification was verified at the start of the visit: Yes    Total time spent for this encounter: 1 hour    - this encounter was done as one on one virtual /  phone visit as no group sessions being offered for 2 months due to covid - 19 pandemic  1/25/21CB  1 hour      Services were provided through a video synchronous discussion virtually to substitute for in-person clinic visit. Patient and provider were located at their individual home/office. Diabetes Self- Management Education Program Assessment -   Also see Diabetic Screening  Patient, Ana Ricomaximiliano,  here for diabetes self-management education  visit/ assessment. Today's visit was in an individual setting. MEDICAL HISTORY:  Past Medical History:   Diagnosis Date    Hepatitis C     Hypertension     Liver disease     sees GI, treated for Hep C     Non-rheumatic mitral regurgitation     sees cardio,    Renal insufficiency 6/26/2018    Severe obesity (BMI 35.0-39.9) 9/11/2018    Ventricular tachycardia ([address]      Family History   Problem Relation Age of Onset    Breast Cancer Mother     Cancer Mother     Coronary Art Dis Father         fatal MI 72     Patient has no known allergies.    Immunization History   Administered Date(s) Administered    Influenza, Quadv, IM, (6 mo and older Fluzone, Flulaval, Fluarix and 3 yrs and older Afluria) 10/23/2018    Influenza, Quadv, IM, PF (6 mo and older Fluzone, Flulaval, Fluarix, and 3 yrs and older Afluria) 11/21/2019, 09/24/2020    Pneumococcal Polysaccharide ([id number]) 12/11/2018    Tdap (Boostrix, Adacel) 07/06/2018     Current Medications  Current Outpatient Medications   Medication Sig Dispense Refill    insulin lispro (HUMALOG KWIKPEN) 200 UNIT/ML SOPN pen qAC sliding scale 0-150=0 units 151-200=2 units 201-250=4 units 251-300=6 units 301-350= 8 units 351+ = 10 units max: 30 units 2 pen 3    Blood Glucose Monitoring Suppl (FREESTYLE LITE) ELE use as directed      insulin glargine (LANTUS SOLOSTAR) 100 UNIT/ML injection pen Inject 15 Units into the skin nightly 5 pen 3    glucose monitoring kit (FREESTYLE) monitoring kit 1 kit by Does not apply route daily 1 kit 0    Lancets MISC Use one Lancet per blood sugar test 100 each 0    blood glucose monitor strips Test 3 times a day & as needed for symptoms of irregular blood glucose.  100 strip 0    Insulin Pen Needle ([employer] PEN NEEDLES 31G) 31G X 8 MM MISC 1 each by Does not apply route daily 100 each 3    atorvastatin (LIPITOR) 20 MG tablet Take 1 tablet by mouth daily 30 tablet 3    glimepiride (AMARYL) 2 MG tablet Take 1 tablet by mouth every morning 30 tablet 3    metoprolol tartrate (LOPRESSOR) 25 MG tablet take 1 tablet by mouth twice a day       No current facility-administered medications for this encounter.    :     Comments:  Allergies:  No Known Allergies      A1C blood level - at goal < 7%   Lab Results   Component Value Date    LABA1C 10.6 (H) 12/16/2020    LABA1C 6.4 05/21/2019    LABA1C 6.6 03/21/2019     Lab Results   Component Value Date    GLUF 268 (H) 12/16/2020    CREATININE 1.31 (H) 12/16/2020       Blood pressure ( 130/ 80)  Or less  BP Readings from Last 3 Encounters:   01/18/21 120/82   12/18/20 124/78   12/03/20 128/80        Cholesterol ( LDL under  100)   Lab Results   Component Value Date    LDLCHOLESTEROL 132 (H) 12/16/2020       Diabetes Self- Management Education Record    Participant Name: Anson Ramires  Referring Provider: Violet TATUM PA-C   Assessment/Evaluation Ratings:  1=Needs Instruction   4=Demonstrates Understanding/Competency  2=Needs Review   NC=Not Covered    3=Comprehends Key Points  N/A=Not Applicable  Topics/Learning Objectives Pre-session Assess Date:  1/4/21rs  Instr. Date Reinforce Date Post- session Eval Comments   Diabetes disease process & Treatment process: Define diabetes & pre-diabetes; Identify own type of diabetes; role of the pancreas; signs/symptoms; diagnostic criteria; prevention & treatment options; contributing factors. 1 1/11/21rs  1/25/21CB  - 1/4/21rs-  new dx of type 2 dm with pcp visit 12/2020- has some symptoms of thirst and frequent urination   - 1/4/21rs -  - hx of hep C ( remission) hx of ckd stage 3,  hx if MVR    Reviewed progression of T2DM. Pt hoping to get off insulin. Incorporating nutritional management into lifestyle: Describe effect of type, amount & timing of food on blood glucose; Describe basic meal planning techniques & current nutrition guideline   Bk meal at 9 am   Cereal and milk and banana or vale or egg or go mcdonals    YUVAL - orange or sandwithces or skipped    Dn- fz meals pot pies - meat loaf and mash pot  fz pizza   Not big eater or cook    - now retired lives alone and does not cook much     - snacking food cookies - willing to give up       Mostly non caloric fluids like water - some milk - cutting back on juices      -1/11/21rs   Stated he got rid of cookies and junk food in home   Hungarian  [place]) lunch meat sandwiches  Go to deli meals   Now drinking more water  1/20/21 JW- used dinner plate guide from [employer] book and gave suggestions within current routine. . Emphasis on eating something at lunch and including more free veges.    What to eat - Food groups, When to eat - timing of meals and snacks, and How much to eat - portions control. Suggested 1600-200 calories/ day bc pt asked   CHO choices/ meal 3-4/meal, 1/snack   CHO choices/  day   grams of protein /day   gram of fat /day     Correctly read food labels & demonstrate CHO counting & portion control with personalized meal plan. Identify dining out strategies, & dietary sick day guidelines. 1 1/20/21 JW      Incorporating physical activity into lifestyle:   Verbalize effect of exercise on blood glucose levels; benefits of regular exercise; safety considerations; contraindications; maintenance of activity. 1 1/11/21rs     - 1/4/21 - rs less active now - retired   -1/11/21 - staying active in home - stated like to ride bike - and [place]     Using medications safely:  Identify effects of diabetes medicines on blood glucose levels; List diabetes medication taken, action & side effects;    1 1/25/21CB   1/4/21rs - Started amaryl 2 mg daily in Dec 2020     Insulin / Injectable - Appropriate injection sites; proper storage; supplies needed; proper technique; safe needle disposal guidelines. 1 1/4/21rs  1/25/21CB  1/4/21rs Starting insulin pens: lantus 20 units  Once daily bedtime and Humalog per correction  ( 2 for 50 over 150 ) per meals meals, demo of insulin pen with return demo and detailed explanation and written plan for insulin - BD getting started booklet      Pt states that he has 3 of same pens that says Lantus Solostar on it with light gray and purple and uses that same pen for his ISS but has not had to use much past week because BG staying   Reviewed insulin action of Lantus and Humalog. Referred to page 23 in How to Thrive ADA book. Patient was unaware he was to have 2 different kinds of pens. Pt instructed to only take the Lantus at bedtime using the 15 units as ordered and writer would contact Pharmacy about why he did not get the Humalog.  [facility] contacted and writer spoke with Bernice Felix who was waiting for PCP to clarify amount of total daily insulin and how often to use ISS. Ronny Baca states he will contact PCP again to clarify. Patient uses abd area and encouraged to rotate. 1/25/21CB   Monitoring blood glucose, interpreting and using results:  Identify recommended & personal blood glucose targets; importance of testing; testing supplies; HgbA1C target levels; Factors affecting blood glucose; Importance of logging blood glucose levels for pattern recognition; ketone testing; safe lancet disposal.   1 1/4/21rs 1/25/21CB 1/4/21rs New  To BG checks - taught how to use contour next meter with 10 starter pack  - note to PCP to order a meter through the insurance / send to pharmacy  BG today 225     - new freestyle meter meter at [facility] :  - fasting  - 112 - 117 - 128 - 134 - 160 ( with 20 units lantus)   - only needed correction scale - X 2 this week    BG have been 105 this am -124 in am and  pre lunch and 69 141 pre dinner. Pt does not check 2hpp from dinner. BG much improved and was told if using Humalog scale as ordered once he receive it causes low BG to call the office right away as he may not need ISS.1/25/21CB   Prevention, detection & treatment of acute complications:  Identify symptoms of hyper & hypoglycemia, and prevention & treatment strategies. 1 1/11/21rs   -- 1/4/21rs  reviewed how insulin works and risk for low BG with insulin and rule of 15 reviewed -    Describe sick day guidelines & indications for  physician notification. Identify short term consequences of poor control. Disaster preparedness strategies    1    --1/11/21 - discussed sick day care and risk of  High blood     Prevention, detection & treatment of chronic complications:  Define the natural course of diabetes & describe the relationship of blood glucose levels to long term complications of diabetes. Identify preventative measures & standards of care.    1 1/25/21CB   1/4/21rs  - has PCP care every 6 mo - kidney md every 6 mo - cardio for MVR and cholesterol every year - eye Md every 3 - 6 months ( has issue with left eye may - aug 2020)   - hep c in remission does not see liver md now -   1/11/21stated he will get covid -23  - shot  When available stated his name is on the list    Pt saw eye Dr past summer due to strained muscle in eye that caused his pupil to become fixed  Sees dentist 2-3x/year1/25/21CB   Developing strategies to address psychosocial issues:  Describe feelings about living with diabetes; Describe how stress, depression & anxiety affect blood glucose; Identify coping strategies; Identify support needed & support network available. 1 1/25/21CB   1/4/21rs - ID a lot of info to learn with new skills for BG and insulin use as well at dx of dm     Patient very appreciative of classes as he was unaware of how his medications worked and did not know he was taking the Lantus incorrectly. Developing strategies to promote health/change behavior: Identify 7 self-care behaviors; Personal health risk factors; Benefits, challenges & strategies for behavioral change;    1         Individualized goal selection. My goal , to help me improve my health, I will:   1. Healthy eating - Use plate method for balance / eat less starches and more veggies  - avoid skipping meals      2. Monitoring- start to check BG fasting and pre meal - log BG and insulin use in log book/ home note book    3. Medications - start to take insulins as rx plan - lantus and humalog        Plan  Follow-up Appointments planned in individual setting. Next Appointment in 1 weeks. Follow up planned via phone call, patient does not have computer at home.  Given class 1 and 2 folder on 1/4/21RS     Instruction Method: [x]Lecture/Discussion  []Power Point Presentation  []Handouts  []Return Demonstration      Education Materials/Equipment Provided:    [x]Self-Management - Initial assessment - Enrolment in to ADA  Where do I Begin, Living with Type 2 diabetes\"  ADA home support program and handout for no concentrated sweets and diet meal planning basics, handout on diabetes education classes. [x]Self-Management  Class 1 - \"How to Thrive: A guide for Your journey with Diabetes\" - ADA booklet 2020  - pages 4, 11- 15 , 18 -19 -- 1/11/21rs    [x] Self-Management  Class 2 - Meal Plan and handout for serving sizes, smarter snacking, Ready Set Carb Counting / Plate Method, Nutrient Conversion and International [address] Eating for People with Diabetes and Nutrition in the [employer] - fast facts about fast food -How to Thrive: A guide for Your journey with Diabetes\" - ADA booklet 2020  - pages 16 -171/20/21 JW    [x] Self-Management  Class 3 -  Diabetes ID card,  foot care tips sheet,  Individualized Diabetes report card - \"How to Thrive: A guide for Your journey with Diabetes\" - ADA booklet 2020 page 6- 9  and 20 - 351/25/21CB  Mailed out report card, GLP-1 handout and Know your numbers    [] Self-Management Class 4 - BD Booklet  Sick Day Rules and  [address] , recipe hand outs and tips, diabetes Cookbooks  ( when available) How to Thrive: A guide for Your journey with Diabetes\" - ADA booklet 2020  - pages 39 -39     []Self-Management -  Self-Management - 3 month follow -  AADE7 Self care behaviors work sheets,  Online resource list - March 2020  and   How to Thrive: A guide for Your journey with Diabetes\" - ADA booklet 2020  - pages 39. []Self-Management  Gestational - RN class -Gestational Diabetes Mellitus ( GDM) toolkit form [place] gestational diabetes postpartum care learning collaborative 2018. Gestational diabetes handout from [facility] jan 2016  \"Simple Guidelines for meal planning with gestational diabetes\" handout 3 meals and 3 snacks  SMBG sheets to fax back to [facility] weekly  BD  healthy injection site selection and rotation with 6 mm insulin syringe and 4 mm pen needle  Did you have gestational diabetes when you were pregnant?  Handout from [facility] April 2014    []Self-Management Gestational - RD class - My Food Plan for Gestational diabetes    []Glucose Meter     []Insulin Kit     []Other      Encounter Type Date Start Time End Time Comments No Show Dates   Assessment 1/4/21rs    10 39  1200    [x]In Person  []Telephone    Class 1 - Understanding diabetes 1/11/21rs  1100  1200  x    Class 2- Nutrition and diabetes   1/20/21 JW 9:00 10:00 x    Class 3 - Preventing Complications 3/40/63BI 10:79 11:00  x    Class 4 -  In depth Nutrition and sick day care        Class 5 - 3 month follow up / goal reassessment        Gestational - RN         Gestational - RD        Individual MNT         Shared Med Appt         Yearly Follow-up        Meter Instrx 1/4/21rs        Insulin Instrx 1/4/21rs      [x]Pen  []Vial & Syringe      DSMS Support :   [] MNT      [] Annual update     [] Starting Fresh  adults living with diabetes or pre diabetes. [address] [place] [address] [phone] call for dates    []  Diabetes Group at  [address] [place] - Free 6 week diabetes education support   classes - use web site interest form found at  [email or web address].pt - to enroll       []ADA  Where do I Begin, Living with Type 2 diabetes ADA home support program  Web site: diabetes. org/living    Call: 1800 DIABETES  e-mail: Nacho@[email or web address] org     []  Internet web sites - ADAWeb site: diabetes. org and diabetesfoodhub.org      Post Education Referrals:      [] [facility] information sheet and [address] [facility] , 21       [] Dental care - [facility]     [] [facility] link  phone number - for information and referral to [facility]  Clinically  [address], WEIGHT MANAGEMENT        []Other  Santiago Brown RN CDE

## 2021-01-25 NOTE — TELEPHONE ENCOUNTER
[address] [facility] called about Nabil Denton, would like to know how many units and how many times a day the patient should be using medication. Please adivse.

## 2021-01-29 DIAGNOSIS — E11.9 DIABETES MELLITUS, NEW ONSET (HCC): ICD-10-CM

## 2021-01-29 RX ORDER — BLOOD-GLUCOSE METER
KIT MISCELLANEOUS
Qty: 100 STRIP | Refills: 0 | Status: SHIPPED | OUTPATIENT
Start: 2021-01-29 | End: 2021-03-03

## 2021-01-29 NOTE — TELEPHONE ENCOUNTER
Next Visit Date:  Future Appointments   Date Time Provider Bradley Abdullahii   2/3/2021 10:00 AM Alyss[facility]adez [facility]   4/1/2021 11:10 AM Rocael Ackerman MD [facility] None   4/19/2021 10:00 AM [facility] [address] Maintenance   Topic Date Due    Diabetic retinal exam  03/26/1965    Shingles Vaccine (1 of 2) 03/26/2007    Low dose CT lung screening  03/26/2012    Hepatitis A vaccine (1 of 2 - Risk 2-dose series) 01/18/2022 (Originally 3/26/1956)    A1C test (Diabetic or Prediabetic)  03/16/2021    Diabetic microalbuminuria test  06/11/2021    Lipid screen  12/16/2021    Potassium monitoring  12/16/2021    Creatinine monitoring  12/16/2021    Diabetic foot exam  12/18/2021    Colon cancer screen fecal DNA test (Cologuard)  12/08/2022    DTaP/Tdap/Td vaccine (2 - Td) 07/06/2028    Flu vaccine  Completed    Pneumococcal 0-64 years Vaccine  Completed    HIV screen  Completed    Hib vaccine  Aged Out    Meningococcal (ACWY) vaccine  Aged Out       Hemoglobin A1C (%)   Date Value   12/16/2020 10.6 (H)   05/21/2019 6.4   03/21/2019 6.6             ( goal A1C is < 7)   No results found for: LABMICR  LDL Cholesterol (mg/dL)   Date Value   12/16/2020 132 (H)   11/15/2019 110       (goal LDL is <100)   AST (U/L)   Date Value   12/16/2020 29     ALT (U/L)   Date Value   12/16/2020 37     BUN (mg/dL)   Date Value   12/16/2020 21     BP Readings from Last 3 Encounters:   01/18/21 120/82   12/18/20 124/78   12/03/20 128/80          (goal 120/80)    All Future Testing planned in [facility]  Lab Frequency Next Occurrence   Basic Metabolic Panel Every 16 weeks    CBC Auto Differential Every 16 weeks    Creatinine, Random Urine Every 16 weeks    Protein / creatinine ratio, urine Every 16 weeks    Protein, urine, random Every 16 weeks    PTH, INTACT WITH IONIZED CALCIUM Every 16 weeks    Vitamin D 25 Hydroxy Every 16 weeks    Phosphorus Every 16 weeks    Basic Metabolic Panel Every 16 weeks    CBC Auto Differential Every 16 weeks    Creatinine, Random Urine Every 16 weeks    Phosphorus Every 16 weeks    Protein / creatinine ratio, urine Every 16 weeks    PTH, INTACT WITH IONIZED CALCIUM Every 16 weeks    Protein, urine, random Every 16 weeks    Vitamin D 25 Hydroxy Every 16 weeks    Basic Metabolic Panel Every 16 weeks    CBC Auto Differential Every 16 weeks    Creatinine, Random Urine Every 16 weeks    Phosphorus Every 16 weeks    Protein / creatinine ratio, urine Every 16 weeks    Protein, urine, random Every 16 weeks    PTH, INTACT WITH IONIZED CALCIUM Every 16 weeks    Vitamin D 25 Hydroxy Every 16 weeks                Patient Active Problem List:     Elevated liver enzymes     Family history of coronary arteriosclerosis     History of alcohol abuse     History of tobacco abuse     Non-rheumatic mitral regurgitation     Renal insufficiency     Biliary cirrhosis (HCC)     Diastolic dysfunction     Low HDL (under 40)     Prediabetes     Pure hypercholesterolemia     Mild concentric left ventricular hypertrophy (LVH)     Hepatitis C antibody test positive     Chronic diastolic heart failure (HCC)     Essential hypertension     Severe obesity with body mass index (BMI) of 35.0 to 39.9 with serious comorbidity ([facility] [address]     History of hepatitis C     Diabetes mellitus, new onset ([facility] [address]

## 2021-02-03 ENCOUNTER — HOSPITAL ENCOUNTER (OUTPATIENT)
Dept: DIABETES SERVICES | Age: 66
Setting detail: THERAPIES SERIES
Discharge: HOME | End: 2021-02-03
Payer: COMMERCIAL

## 2021-02-03 PROCEDURE — G0108 DIAB MANAGE TRN  PER INDIV: HCPCS

## 2021-02-03 NOTE — PROGRESS NOTES
This visit is a TeleHealth encounter (During RWCSX-18 public health emergency). Pursuant to the emergency declaration under the [facility], [address] [place] [address] and the [employer] Act, this Virtual Visit was conducted with patient's (and/or legal guardian's) consent, to reduce the patient's risk of exposure to COVID-19 and provide necessary medical care. The patient (and/or legal guardian) has also been advised to contact this office for worsening conditions or problems, and seek emergency medical treatment and/or call 911 if deemed necessary. Patient identification was verified at the start of the visit: Yes    Total time spent for this encounter: 1 hour    - this encounter was done as one on one  phone visit as no group sessions being offered for 2 months due to covid - 19 pandemic  Services were provided through a video synchronous discussion virtually to substitute for in-person clinic visit. Patient and provider were located at their individual home/office. Diabetes Self- Management Education Program Assessment -   Also see Diabetic Screening  Patient, Belen Avalos,  here for diabetes self-management education  visit/ assessment. Today's visit was in an individual setting. MEDICAL HISTORY:  Past Medical History:   Diagnosis Date    Hepatitis C     Hypertension     Liver disease     sees GI, treated for Hep C     Non-rheumatic mitral regurgitation     sees cardio,    Renal insufficiency 6/26/2018    Severe obesity (BMI 35.0-39.9) 9/11/2018    Ventricular tachycardia ([facility] [address]      Family History   Problem Relation Age of Onset    Breast Cancer Mother     Cancer Mother     Coronary Art Dis Father         fatal MI 72     Patient has no known allergies.    Immunization History   Administered Date(s) Administered    Influenza, Quadv, IM, (6 mo and older Fluzone, Flulaval, Fluarix and 3 yrs and older Afluria) 10/23/2018    Influenza, Mariya [place], IM, PF (6 mo and older Fluzone, Flulaval, Fluarix, and 3 yrs and older Afluria) 11/21/2019, 09/24/2020    Pneumococcal Polysaccharide ([id number]) 12/11/2018    Tdap (Boostrix, Adacel) 07/06/2018     Current Medications  Current Outpatient Medications   Medication Sig Dispense Refill    blood glucose test strips (FREESTYLE LITE) strip USE 1 TEST STRIP TO TEST BLOOD SUGAR 3 TIMES DAILY AS NEEDED FOR SYMPTOMS OF IRREGULAR BLOOD GLUCOSE. 100 strip 0    insulin lispro (HUMALOG KWIKPEN) 200 UNIT/ML SOPN pen qAC sliding scale 0-150=0 units 151-200=2 units 201-250=4 units 251-300=6 units 301-350= 8 units 351+ = 10 units max: 30 units 2 pen 3    Blood Glucose Monitoring Suppl (FREESTYLE LITE) ELE use as directed      insulin glargine (LANTUS SOLOSTAR) 100 UNIT/ML injection pen Inject 15 Units into the skin nightly 5 pen 3    glucose monitoring kit (FREESTYLE) monitoring kit 1 kit by Does not apply route daily 1 kit 0    Lancets MISC Use one Lancet per blood sugar test 100 each 0    Insulin Pen Needle ([employer] PEN NEEDLES 31G) 31G X 8 MM MISC 1 each by Does not apply route daily 100 each 3    atorvastatin (LIPITOR) 20 MG tablet Take 1 tablet by mouth daily 30 tablet 3    glimepiride (AMARYL) 2 MG tablet Take 1 tablet by mouth every morning 30 tablet 3    metoprolol tartrate (LOPRESSOR) 25 MG tablet take 1 tablet by mouth twice a day       No current facility-administered medications for this encounter.    :     Comments:  Allergies:  No Known Allergies      A1C blood level - at goal < 7%   Lab Results   Component Value Date    LABA1C 10.6 (H) 12/16/2020    LABA1C 6.4 05/21/2019    LABA1C 6.6 03/21/2019     Lab Results   Component Value Date    GLUF 268 (H) 12/16/2020    CREATININE 1.31 (H) 12/16/2020       Blood pressure ( 130/ 80)  Or less  BP Readings from Last 3 Encounters:   01/18/21 120/82   12/18/20 124/78   12/03/20 128/80        Cholesterol ( LDL under  100) Lab Results   Component Value Date    LDLCHOLESTEROL 132 (H) 12/16/2020       Diabetes Self- Management Education Record    Participant Name: Maria M Oliveira  Referring Provider[facility] TATUM[facility] ZORA   Assessment/Evaluation Ratings:  1=Needs Instruction   4=Demonstrates Understanding/Competency  2=Needs Review   NC=Not Covered    3=Comprehends Key Points  N/A=Not Applicable  Topics/Learning Objectives Pre-session Assess Date:  1/4/21rs  Instr. Date Reinforce Date Post- session Eval Comments   Diabetes disease process & Treatment process: Define diabetes & pre-diabetes; Identify own type of diabetes; role of the pancreas; signs/symptoms; diagnostic criteria; prevention & treatment options; contributing factors. 1 1/11/21rs  1/25/21CB  - 1/4/21rs-  new dx of type 2 dm with pcp visit 12/2020- has some symptoms of thirst and frequent urination   - 1/4/21rs -  - hx of hep C ( remission) hx of ckd stage 3,  hx if MVR    Reviewed progression of T2DM. Pt hoping to get off insulin. Incorporating nutritional management into lifestyle: Describe effect of type, amount & timing of food on blood glucose; Describe basic meal planning techniques & current nutrition guideline   Bk meal at 9 am   Cereal and milk and banana or vale or egg or go mcdonals    YUVAL - orange or sandwithces or skipped    Dn- fz meals pot pies - meat loaf and mash pot  fz pizza   Not big eater or cook    - now retired lives alone and does not cook much     - snacking food cookies - willing to give up       Mostly non caloric fluids like water - some milk - cutting back on juices      -1/11/21rs   Stated he got rid of cookies and junk food in home   [place] ([facility]) lunch meat sandwiches  Go to deli meals   Now drinking more water  1/20/21 JW- used dinner plate guide from [employer] book and gave suggestions within current routine. . Emphasis on eating something at lunch and including more free veges.     - 2/3/21rs now eating smaller portions and looking at recipe book that we sent   - rare to drink etoh - limits intake now 2/3/21rs  What to eat - Food groups, When to eat - timing of meals and snacks, and How much to eat - portions control. Suggested 1600-200 calories/ day bc pt asked   CHO choices/ meal 3-4/meal, 1/snack   CHO choices/  day   grams of protein /day   gram of fat /day     Correctly read food labels & demonstrate CHO counting & portion control with personalized meal plan. Identify dining out strategies, & dietary sick day guidelines. 1 1/20/21 JW 2/3/21 rs     Incorporating physical activity into lifestyle:   Verbalize effect of exercise on blood glucose levels; benefits of regular exercise; safety considerations; contraindications; maintenance of activity. 1 1/11/21rs     - 1/4/21 - rs less active now - retired -- worked for [employer] and worked for [employer]  outdoors  -1/11/21 - staying active in home - stated like to ride bike - and [place]  - discussed strategies for winter indoor activity 2/3/21rs      Using medications safely:  Identify effects of diabetes medicines on blood glucose levels; List diabetes medication taken, action & side effects;    1 1/25/21CB   1/4/21rs - Started amaryl 2 mg daily in Dec 2020     Insulin / Injectable - Appropriate injection sites; proper storage; supplies needed; proper technique; safe needle disposal guidelines. 1 1/4/21rs  1/25/21CB  1/4/21rs Starting insulin pens: lantus 20 units  Once daily bedtime and Humalog per correction  ( 2 for 50 over 150 ) per meals meals, demo of insulin pen with return demo and detailed explanation and written plan for insulin - BD getting started booklet      Pt states that he has 3 of same pens that says Lantus Solostar on it with light gray and purple and uses that same pen for his ISS but has not had to use much past week because BG staying   Reviewed insulin action of Lantus and Humalog. Referred to page 23 in How to Thrive ADA book.  Patient was unaware he was to have 2 different kinds of pens. Pt instructed to only take the Lantus at bedtime using the 15 units as ordered and writer would contact Pharmacy about why he did not get the Humalog. [facility] contacted and writer spoke with Daisy Sandoval who was waiting for PCP to clarify amount of total daily insulin and how often to use ISS. Daisy Sandoval states he will contact PCP again to clarify. Patient uses abd area and encouraged to rotate. 1/25/21CB  -2/3/21 - now has Humalog pens and using    Monitoring blood glucose, interpreting and using results:  Identify recommended & personal blood glucose targets; importance of testing; testing supplies; HgbA1C target levels; Factors affecting blood glucose; Importance of logging blood glucose levels for pattern recognition; ketone testing; safe lancet disposal.   1 1/4/21rs  1/25/21CB  1/4/21rs New  To BG checks - taught how to use contour next meter with 10 starter pack  - note to PCP to order a meter through the insurance / send to pharmacy  BG today 225     - new freestyle meter meter at [facility] :  - fasting  - 112 - 117 - 128 - 134 - 160 ( with 20 units lantus)   - only needed correction scale - X 2 this week    BG have been 105 this am -124 in am and  pre lunch and 69 141 pre dinner. Pt does not check 2hpp from dinner. BG much improved and was told if using Humalog scale as ordered once he receive it causes low BG to call the office right away as he may not need ISS.1/25/21CB   Prevention, detection & treatment of acute complications:  Identify symptoms of hyper & hypoglycemia, and prevention & treatment strategies. 1 1/11/21rs   -- 1/4/21rs  reviewed how insulin works and risk for low BG with insulin and rule of 15 reviewed -    Describe sick day guidelines & indications for  physician notification. Identify short term consequences of poor control.  Disaster preparedness strategies    1 2/3/21 rs   --1/11/21 - discussed sick day care and risk of  High blood    -- patient stated his niece helped to get get signed up for covid vaccination   Prevention, detection & treatment of chronic complications:  Define the natural course of diabetes & describe the relationship of blood glucose levels to long term complications of diabetes. Identify preventative measures & standards of care. 1 1/25/21CB   1/4/21rs  - has PCP care every 6 mo - kidney md every 6 mo - cardio for MVR and cholesterol every year - eye Md every 3 - 6 months ( has issue with left eye may - aug 2020)   - hep c in remission does not see liver md now -   1/11/21stated he will get covid -23  - shot  When available stated his name is on the list    Pt saw eye Dr past summer due to strained muscle in eye that caused his pupil to become fixed  Sees dentist 2-3x/year1/25/21CB    - 2/3/21 rs - has follow up cardio - and stated he is good to follow up in one more year   Developing strategies to address psychosocial issues:  Describe feelings about living with diabetes; Describe how stress, depression & anxiety affect blood glucose; Identify coping strategies; Identify support needed & support network available. 1 1/25/21CB   1/4/21rs - ID a lot of info to learn with new skills for BG and insulin use as well at dx of dm     Patient very appreciative of classes as he was unaware of how his medications worked and did not know he was taking the Lantus incorrectly. Developing strategies to promote health/change behavior: Identify 7 self-care behaviors; Personal health risk factors; Benefits, challenges & strategies for behavioral change;    1 2/3/21 rs        Individualized goal selection. My goal , to help me improve my health, I will:   1. Healthy eating - Use plate method for balance / eat less starches and more veggies  - avoid skipping meals      2. Monitoring- start to check BG fasting and pre meal - log BG and insulin use in log book/ home note book    3.  Medications - start to take insulins as rx plan - lantus and humalog        Plan  Follow-up Appointments planned in individual setting. Next Appointment in 1 weeks. Follow up planned via phone call, patient does not have computer at home. Given class 1 and 2 folder on 1/4/21RS     Instruction Method: [x]Lecture/Discussion  []Power Point Presentation  []Handouts  []Return Demonstration      Education Materials/Equipment Provided:    [x]Self-Management - Initial assessment - Enrolment in to ADA  Where do I Begin, Living with Type 2 diabetes\"  ADA home support program and handout for no concentrated sweets and diet meal planning basics, handout on diabetes education classes.      [x]Self-Management  Class 1 - \"How to Thrive: A guide for Your journey with Diabetes\" - ADA booklet 2020  - pages 4, 11- 15 , 18 -19 -- 1/11/21rs    [x] Self-Management  Class 2 - Meal Plan and handout for serving sizes, smarter snacking, Ready Set Carb Counting / Plate Method, Nutrient Conversion and International [address] Eating for People with Diabetes and Nutrition in the [employer] - fast facts about fast food -How to Thrive: A guide for Your journey with Diabetes\" - ADA booklet 2020  - pages 16 -171/20/21 JW    [x] Self-Management  Class 3 -  Diabetes ID card,  foot care tips sheet,  Individualized Diabetes report card - \"How to Thrive: A guide for Your journey with Diabetes\" - ADA booklet 2020 page 6- 9  and 20 - 351/25/21CB  Mailed out report card, GLP-1 handout and Know your numbers    [x] Self-Management Class 4 - BD Booklet  Sick Day Rules and  [address] , recipe hand outs and tips, diabetes Cookbooks  ( when available) How to Thrive: A guide for Your journey with Diabetes\" - ADA booklet 2020  - pages 36 -44 -2/3/21 rs  -  []Self-Management -  Self-Management - 3 month follow -  AADE7 Self care behaviors work sheets,  Online resource list - March 2020  and   How to Thrive: A guide for Your journey with Diabetes\" - ADA booklet 2020  - pages 44.    []Self-Management  Gestational - RN class -Gestational Diabetes Mellitus ( GDM) toolkit form [place] gestational diabetes postpartum care learning collaborative 2018. Gestational diabetes handout from [facility] jan 2016  \"Simple Guidelines for meal planning with gestational diabetes\" handout 3 meals and 3 snacks  SMBG sheets to fax back to MFM weekly  BD  healthy injection site selection and rotation with 6 mm insulin syringe and 4 mm pen needle  Did you have gestational diabetes when you were pregnant? Handout from [facility]  April 2014    []Self-Management Gestational - RD class - My Food Plan for Gestational diabetes    []Glucose Meter     []Insulin Kit     []Other      Encounter Type Date Start Time End Time Comments No Show Dates   Assessment 1/4/21rs    10 39  1200    [x]In Person  []Telephone    Class 1 - Understanding diabetes 1/11/21rs  1100  1200  x    Class 2- Nutrition and diabetes   1/20/21 JW 9:00 10:00 x    Class 3 - Preventing Complications 0/97/48EA 38:63 11:00  x    Class 4 -  In depth Nutrition and sick day care 2/3/21 rs 1000  1100  X phone call     Class 5 - 3 month follow up / goal reassessment        Gestational - RN         Gestational - RD        Individual MNT         Shared Med Appt         Yearly Follow-up        Meter Instrx 1/4/21rs        Insulin Instrx 1/4/21rs      [x]Pen  []Vial & Syringe      DSMS Support :   [] MNT      [] Annual update     [] Starting Fresh  adults living with diabetes or pre diabetes. [address] [place] [address] [phone] call for dates    []  Diabetes Group at  [place] [address] of blanca - Free 6 week diabetes education support   classes - use web site interest form found at  [employer] - to enroll       []ADA  Where do I Begin, Living with Type 2 diabetes ADA home support program  Web site: diabetes. org/living    Call: 1800 DIABETES  e-mail: [email or web address] org     []  Internet web sites - ADAWeb site: diabetes. org and diabetesfoodhub.org      Post Education Referrals:      [] [address] information sheet and [address] [facility] , 21       [] Dental care - [facility]     [] [facility] ([facility]) link  phone number - for information and referral to [facility] NIDIA PÉREZ  Clinically  [address] [facility], WEIGHT MANAGEMENT        []Other  Viv Kilgore, RN CDE

## 2021-02-09 DIAGNOSIS — E11.9 DIABETES MELLITUS, NEW ONSET (HCC): ICD-10-CM

## 2021-02-10 RX ORDER — LANCETS 28 GAUGE
EACH MISCELLANEOUS
Qty: 100 EACH | Refills: 0 | Status: SHIPPED | OUTPATIENT
Start: 2021-02-10 | End: 2021-03-16 | Stop reason: SDUPTHER

## 2021-02-10 NOTE — TELEPHONE ENCOUNTER
Electronic medication refill request. Pharmacy on file. Please advise.         Next Visit Date:  Future Appointments   Date Time Provider Bradley Kramer   4/1/2021 11:10 AM Emmanuel Fernando MD [facility] None   4/19/2021 10:00 AM MD Dwaine Benoit [facility]   4/20/2021 10:00 AM Kevin Qureshi RN [facility] [address] Maintenance   Topic Date Due    Diabetic retinal exam  03/26/1967    Shingles Vaccine (1 of 2) 03/26/2007    Low dose CT lung screening  03/26/2012    Hepatitis A vaccine (1 of 2 - Risk 2-dose series) 01/18/2022 (Originally 3/26/1956)    A1C test (Diabetic or Prediabetic)  03/16/2021    Diabetic microalbuminuria test  06/11/2021    Lipid screen  12/16/2021    Potassium monitoring  12/16/2021    Creatinine monitoring  12/16/2021    Diabetic foot exam  12/18/2021    Colon cancer screen fecal DNA test (Cologuard)  12/08/2022    DTaP/Tdap/Td vaccine (2 - Td) 07/06/2028    Flu vaccine  Completed    Pneumococcal 0-64 years Vaccine  Completed    HIV screen  Completed    Hib vaccine  Aged Out    Meningococcal (ACWY) vaccine  Aged Out       Hemoglobin A1C (%)   Date Value   12/16/2020 10.6 (H)   05/21/2019 6.4   03/21/2019 6.6             ( goal A1C is < 7)   No results found for: LABMICR  LDL Cholesterol (mg/dL)   Date Value   12/16/2020 132 (H)   11/15/2019 110       (goal LDL is <100)   AST (U/L)   Date Value   12/16/2020 29     ALT (U/L)   Date Value   12/16/2020 37     BUN (mg/dL)   Date Value   12/16/2020 21     BP Readings from Last 3 Encounters:   01/18/21 120/82   12/18/20 124/78   12/03/20 128/80          (goal 120/80)    All Future Testing planned in [facility]  Lab Frequency Next Occurrence   Basic Metabolic Panel Every 16 weeks    CBC Auto Differential Every 16 weeks    Creatinine, Random Urine Every 16 weeks    Phosphorus Every 16 weeks    Protein / creatinine ratio, urine Every 16 weeks    PTH, INTACT WITH IONIZED CALCIUM Every 16 weeks    Protein, urine, random Every 16 weeks    Vitamin D 25 Hydroxy Every 16 weeks    Basic Metabolic Panel Every 16 weeks    CBC Auto Differential Every 16 weeks    Creatinine, Random Urine Every 16 weeks    Phosphorus Every 16 weeks    Protein / creatinine ratio, urine Every 16 weeks    Protein, urine, random Every 16 weeks    PTH, INTACT WITH IONIZED CALCIUM Every 16 weeks    Vitamin D 25 Hydroxy Every 16 weeks                Patient Active Problem List:     Elevated liver enzymes     Family history of coronary arteriosclerosis     History of alcohol abuse     History of tobacco abuse     Non-rheumatic mitral regurgitation     Renal insufficiency     Biliary cirrhosis (HCC)     Diastolic dysfunction     Low HDL (under 40)     Prediabetes     Pure hypercholesterolemia     Mild concentric left ventricular hypertrophy (LVH)     Hepatitis C antibody test positive     Chronic diastolic heart failure ([address]     Essential hypertension     Severe obesity with body mass index (BMI) of 35.0 to 39.9 with serious comorbidity ([address]     History of hepatitis C     Diabetes mellitus, new onset ([address]

## 2021-03-02 DIAGNOSIS — E11.9 DIABETES MELLITUS, NEW ONSET (HCC): ICD-10-CM

## 2021-03-02 NOTE — TELEPHONE ENCOUNTER
Next Visit Date:  Future Appointments   Date Time Provider Bradley Abdullahii   4/1/2021 11:10 AM Kevna Burgess MD [facility] None   4/19/2021 10:00 AM Tena Gaviria MD [facility]   4/20/2021 10:00 AM Woody Roy RN [facility] Maintenance   Topic Date Due    Diabetic retinal exam  Never done    Shingles Vaccine (1 of 2) Never done    Low dose CT lung screening  Never done    A1C test (Diabetic or Prediabetic)  03/16/2021    Hepatitis A vaccine (1 of 2 - Risk 2-dose series) 01/18/2022 (Originally 3/26/1956)    Diabetic microalbuminuria test  06/11/2021    Lipid screen  12/16/2021    Potassium monitoring  12/16/2021    Creatinine monitoring  12/16/2021    Diabetic foot exam  12/18/2021    Colon cancer screen fecal DNA test (Cologuard)  12/08/2022    DTaP/Tdap/Td vaccine (2 - Td) 07/06/2028    Flu vaccine  Completed    Pneumococcal 0-64 years Vaccine  Completed    HIV screen  Completed    Hib vaccine  Aged Out    Meningococcal (ACWY) vaccine  Aged Out       Hemoglobin A1C (%)   Date Value   12/16/2020 10.6 (H)   05/21/2019 6.4   03/21/2019 6.6             ( goal A1C is < 7)   No results found for: LABMICR  LDL Cholesterol (mg/dL)   Date Value   12/16/2020 132 (H)   11/15/2019 110       (goal LDL is <100)   AST (U/L)   Date Value   12/16/2020 29     ALT (U/L)   Date Value   12/16/2020 37     BUN (mg/dL)   Date Value   12/16/2020 21     BP Readings from Last 3 Encounters:   01/18/21 120/82   12/18/20 124/78   12/03/20 128/80          (goal 120/80)    All Future Testing planned in [facility]  Lab Frequency Next Occurrence   Basic Metabolic Panel Every 16 weeks    CBC Auto Differential Every 16 weeks    Creatinine, Random Urine Every 16 weeks    Phosphorus Every 16 weeks    Protein / creatinine ratio, urine Every 16 weeks    PTH, INTACT WITH IONIZED CALCIUM Every 16 weeks    Protein, urine, random Every 16 weeks    Vitamin D 25 Hydroxy Every 16 weeks    Basic Metabolic Panel Every 16 weeks    CBC Auto Differential Every 16 weeks    Creatinine, Random Urine Every 16 weeks    Phosphorus Every 16 weeks    Protein / creatinine ratio, urine Every 16 weeks    Protein, urine, random Every 16 weeks    PTH, INTACT WITH IONIZED CALCIUM Every 16 weeks    Vitamin D 25 Hydroxy Every 16 weeks                Patient Active Problem List:     Elevated liver enzymes     Family history of coronary arteriosclerosis     History of alcohol abuse     History of tobacco abuse     Non-rheumatic mitral regurgitation     Renal insufficiency     Biliary cirrhosis (HCC)     Diastolic dysfunction     Low HDL (under 40)     Prediabetes     Pure hypercholesterolemia     Mild concentric left ventricular hypertrophy (LVH)     Hepatitis C antibody test positive     Chronic diastolic heart failure ([address]     Essential hypertension     Severe obesity with body mass index (BMI) of 35.0 to 39.9 with serious comorbidity ([address]     History of hepatitis C     Diabetes mellitus, new onset ([address]

## 2021-03-03 RX ORDER — BLOOD-GLUCOSE METER
KIT MISCELLANEOUS
Qty: 100 STRIP | Refills: 0 | Status: SHIPPED | OUTPATIENT
Start: 2021-03-03 | End: 2021-04-06

## 2021-03-16 DIAGNOSIS — E11.9 DIABETES MELLITUS, NEW ONSET (HCC): ICD-10-CM

## 2021-03-16 RX ORDER — LANCETS 28 GAUGE
EACH MISCELLANEOUS
Qty: 100 EACH | Refills: 0 | Status: SHIPPED | OUTPATIENT
Start: 2021-03-16 | End: 2021-04-19 | Stop reason: SDUPTHER

## 2021-03-16 NOTE — TELEPHONE ENCOUNTER
[address] [address]  [phone]  Dept: [phone]    PRE-OP EVALUATION:  Today's date: 2017    Eben Craig (: 1934) presents for pre-operative evaluation assessment as requested by Dr. Thompson.  He requires evaluation and anesthesia risk assessment prior to undergoing surgery/procedure for treatment of pulmonary nodules .  Proposed procedure: endobronchial ultrasound flexible    Date of Surgery/ Procedure: 17  Time of Surgery/ Procedure:   Hospital/Surgical Facility: [facility]  Fax number for surgical facility:   Primary Physician: Juliano Forbes  Type of Anesthesia Anticipated: to be determined    Patient has a Health Care Directive or Living Will:  YES     1. NO - Do you have a history of heart attack, stroke, stent, bypass or surgery on an artery in the head, neck, heart or legs?  2. NO - Do you ever have any pain or discomfort in your chest?  3. NO - Do you have a history of  Heart Failure?  4. YES - Are you troubled by shortness of breath when: walking on the level, up a slight hill or at night?previous lung cancer and radiation to the right lung  5. NO - Do you currently have a cold, bronchitis or other respiratory infection?  6. YES - Do you have a cough, shortness of breath or wheezing?  7. NO - Do you sometimes get pains in the calves of your legs when you walk?  8. YES - Do you or anyone in your family have previous history of blood clots?yes with cancer  9. NO - Do you or does anyone in your family have a serious bleeding problem such as prolonged bleeding following surgeries or cuts?  10. NO - Have you ever had problems with anemia or been told to take iron pills?  11. NO - Have you had any abnormal blood loss such as black, tarry or bloody stools, or abnormal vaginal bleeding?  12. NO - Have you ever had a blood transfusion?  13. NO - Have you or any of your relatives ever had problems with anesthesia?  14. NO - Do you  Next Visit Date:  Future Appointments   Date Time Provider Bradley Kramer   4/19/2021 10:00 AM Jayson Cabral MD [facility]   4/20/2021 10:00 AM French Santizo RN [facility] ED [facility]   4/30/2021 11:10 AM Syeda Alvarez MD [facility] None       Health Maintenance   Topic Date Due    Diabetic retinal exam  Never done    COVID-19 Vaccine (1) Never done    Shingles Vaccine (1 of 2) Never done    Low dose CT lung screening  Never done    A1C test (Diabetic or Prediabetic)  03/16/2021    Hepatitis A vaccine (1 of 2 - Risk 2-dose series) 01/18/2022 (Originally 3/26/1956)    Diabetic microalbuminuria test  06/11/2021    Lipid screen  12/16/2021    Potassium monitoring  12/16/2021    Creatinine monitoring  12/16/2021    Diabetic foot exam  12/18/2021    Colon cancer screen fecal DNA test (Cologuard)  12/08/2022    DTaP/Tdap/Td vaccine (2 - Td) 07/06/2028    Flu vaccine  Completed    Pneumococcal 0-64 years Vaccine  Completed    HIV screen  Completed    Hib vaccine  Aged Out    Meningococcal (ACWY) vaccine  Aged Out       Hemoglobin A1C (%)   Date Value   12/16/2020 10.6 (H)   05/21/2019 6.4   03/21/2019 6.6             ( goal A1C is < 7)   No results found for: LABMICR  LDL Cholesterol (mg/dL)   Date Value   12/16/2020 132 (H)   11/15/2019 110       (goal LDL is <100)   AST (U/L)   Date Value   12/16/2020 29     ALT (U/L)   Date Value   12/16/2020 37     BUN (mg/dL)   Date Value   12/16/2020 21     BP Readings from Last 3 Encounters:   01/18/21 120/82   12/18/20 124/78   12/03/20 128/80          (goal 120/80)    All Future Testing planned in [facility]  Lab Frequency Next Occurrence   Basic Metabolic Panel Every 16 weeks    CBC Auto Differential Every 16 weeks    Creatinine, Random Urine Every 16 weeks    Phosphorus Every 16 weeks    Protein / creatinine ratio, urine Every 16 weeks    PTH, INTACT WITH IONIZED CALCIUM Every 16 weeks    Protein, urine, random Every 16 weeks    Vitamin D 25 Hydroxy Every 16 weeks    Basic Metabolic Panel Every 16 weeks    CBC Auto Differential Every 16 weeks    Creatinine, Random Urine Every 16 weeks    Phosphorus Every 16 weeks    Protein / creatinine ratio, urine Every 16 weeks    Protein, urine, random Every 16 weeks    PTH, INTACT WITH IONIZED CALCIUM Every 16 weeks    Vitamin D 25 Hydroxy Every 16 weeks                Patient Active Problem List:     Elevated liver enzymes     Family history of coronary arteriosclerosis     History of alcohol abuse     History of tobacco abuse     Non-rheumatic mitral regurgitation     Renal insufficiency     Biliary cirrhosis (HCC)     Diastolic dysfunction     Low HDL (under 40)     Prediabetes     Pure hypercholesterolemia     Mild concentric left ventricular hypertrophy (LVH)     Hepatitis C antibody test positive     Chronic diastolic heart failure ([address]     Essential hypertension     Severe obesity with body mass index (BMI) of 35.0 to 39.9 with serious comorbidity ([address]     History of hepatitis C     Diabetes mellitus, new onset ([address] have sleep apnea, excessive snoring or daytime drowsiness?  15. NO - Do you have any prosthetic heart valves?  16. YES - Do you have prosthetic joints? Right hip  17. NO - Is there any chance that you may be pregnant?        HPI:                                                      Brief HPI related to upcoming procedure: Left lung nodule with past lung cancer on the right side.  Needs endobronchial ultrasound and biopsy.        See problem list for active medical problems.  Problems all longstanding and stable, except as noted/documented.  See ROS for pertinent symptoms related to these conditions.                                                                                                  .    MEDICAL HISTORY:                                                    Patient Active Problem List    Diagnosis Date Noted     DVT (deep venous thrombosis) (H) 11/17/2010     Priority: High     Thyroid nodule 06/06/2017     Priority: Medium     Need for prophylactic measure 12/07/2016     Priority: Medium     Long-term (current) use of anticoagulants [Z79.01] 03/07/2016     Priority: Medium     Hypothyroidism due to acquired atrophy of thyroid 01/18/2016     Priority: Medium     Type 2 diabetes mellitus with diabetic chronic kidney disease (H) 10/26/2015     Priority: Medium     History of skin cancer 07/31/2015     Priority: Medium     CKD (chronic kidney disease) stage 3, GFR 30-59 ml/min 05/19/2015     Priority: Medium     Neuropathy (H) 05/19/2015     Priority: Medium     Pulmonary nodules 05/19/2015     Priority: Medium     Health Care Home 01/07/2015     Priority: Medium     State Tier Level:  Tier 2  Status:  Declined  Care Coordinator:  Kristin Parkinson RN, BSN    See Letters for [facility] Care Plan  Date:  January 7, 2015           Hyponatremia 01/02/2015     Priority: Medium     Acute respiratory failure (H) 01/02/2015     Priority: Medium     Septic encephalopathy 01/02/2015     Priority: Medium     Leukocytosis  01/02/2015     Priority: Medium     CA - pancreatic cancer 06/27/2012     Priority: Medium     Problem list name updated by automated process. Provider to review and confirm       Advanced directives, counseling/discussion 02/20/2012     Priority: Medium     Advance Directive Problem List Overview:   Name Relationship Phone    Primary Health Care Agent            Alternative Health Care Agent        Advance Directive Initial Visit--3/14/12  Eben Craig presents in person for initial session regarding completion of advanced directive form. He was referred to the facilitator by self.  He currently has no advance directive.  Plan: Patient presents for [employer] Informational meeting. Patient given [employer] folder. Patient will complete Health Care Directive and bring to clinic.  Follow up meeting: As needed. Patient instructed to bring healthcare agent to this meeting.   ..Deb Perez RN    Discussed advance care planning with patient; information given to patient to review. 2/20/2012          Long term current use of anticoagulant therapy 12/05/2011     Priority: Medium     Problem list name updated by automated process. Provider to review       Anxiety 07/15/2011     Priority: Medium     Hypertension goal BP (blood pressure) < 140/90 07/15/2011     Priority: Medium     Non-small cell carcinoma of lung (H) 10/25/2010     Priority: Medium     A-fib (H) 08/24/2009     Priority: Medium     Erectile dysfunction 01/20/2009     Priority: Medium     Allergic rhinitis 08/13/2007     Priority: Medium     Problem list name updated by automated process. Provider to review       Hemorrhoids      Priority: Medium     Problem list name updated by automated process. Provider to review       Lichen planus      Priority: Medium     Hypertrophy of prostate without urinary obstruction 06/20/2006     Priority: Medium     Problem list name updated by automated process. Provider to review       Other specified  idiopathic peripheral neuropathy 07/10/2003     Priority: Medium     Calculus of kidney      Priority: Medium     Impotence of organic origin      Priority: Medium     Reflux esophagitis      Priority: Medium     Primary localized osteoarthrosis, lower leg      Priority: Medium     Hyperlipidemia LDL goal <130 10/31/2010     Priority: Low     Lung cancer, upper lobe (H) 10/12/2010     Priority: Low      Past Medical History:   Diagnosis Date     A-fib (H)     paroxysmal     Calculus of kidney     Pt denies this diagnosis     COPD (chronic obstructive pulmonary disease) (H)     suspected by pulmonology - mild     Dermatophytosis of nail     onychomycosis     Impotence of organic origin      Lichen planus      Malignant neoplasm (H)     right upper lobe lung CA     Primary localized osteoarthrosis, lower leg     degenerative joint disease of the knees     Reflux esophagitis      Skin cancer      Tenosynovitis of foot and ankle     DeQuervain's tenosynovitis     Tobacco use disorder     quit 1981     Unspecified essential hypertension      Unspecified hemorrhoids without mention of complication      Past Surgical History:   Procedure Laterality Date     C APPENDECTOMY       C NONSPECIFIC PROCEDURE      bone spurs right foot     C NONSPECIFIC PROCEDURE  08/18/97    Degenerative medial meniscus tear, left knee, with some Grade II and III changes of the lateral femoral condyle and lateral tibial plateau, relatively small grade II changes of lateral tibial plateau, grade III changes of hte median ridge of the patella     C NONSPECIFIC PROCEDURE  06/17/96    Right lithotripsy     C TOTAL HIP ARTHROPLASTY  04/21/08    Left hip     FOOT SURGERY  9/4/13    Left foot.  [facility]      HC COLONOSCOPY W/WO BRUSH/WASH  01/03/06     HC REPAIR OF NASAL SEPTUM      s/p septoplasty     LAPAROSCOPIC HERNIORRHAPHY INCISIONAL  4/24/2013    Procedure: LAPAROSCOPIC HERNIORRHAPHY INCISIONAL;  laparoscopic mesh repair incisional  hernia,and lysis of adhesions, with open incisional removal of sac with fascia closure;  Surgeon: Yifan Sahu MD;  Location: [facility] OR     LAPAROSCOPIC LYSIS ADHESIONS  4/24/2013    Procedure: LAPAROSCOPIC LYSIS ADHESIONS;;  Surgeon: Yifan Sahu MD;  Location: [facility] OR     PANCREATECTOMY TOTAL, SPLENECTOMY, GASTROSTOMY, COMBINED  06/27/12    [facility] 07/02/12     PHACOEMULSIFICATION WITH STANDARD INTRAOCULAR LENS IMPLANT  8/15/2013    Procedure: PHACOEMULSIFICATION WITH STANDARD INTRAOCULAR LENS IMPLANT;  PHACOEMULSIFICATION CLEAR CORNEA WITH STANDARD INTRAOCULAR LENS IMPLANT  RIGHT;  Surgeon: Benji Nix MD;  Location: [facility] OR     PHACOEMULSIFICATION WITH STANDARD INTRAOCULAR LENS IMPLANT  11/21/2013    Procedure: PHACOEMULSIFICATION WITH STANDARD INTRAOCULAR LENS IMPLANT;  PHACOEMULSIFICATION WITH STANDARD INTRAOCULAR LENS IMPLANT LEFT EYE;  Surgeon: Benji Nix MD;  Location: [facility] OR     Current Outpatient Prescriptions   Medication Sig Dispense Refill     metFORMIN (GLUCOPHAGE-XR) 500 MG 24 hr tablet TAKE TWO TABLETS BY MOUTH TWICE DAILY WITH MEALS 120 tablet 9     warfarin (COUMADIN) 5 MG tablet Take 7.5 mg on Monday and 5 mg all other days, or as directed by the coumadin clinic. 100 tablet 3     levothyroxine (SYNTHROID/LEVOTHROID) 100 MCG tablet TAKE ONE TABLET BY MOUTH ONCE DAILY 90 tablet 3     terazosin (HYTRIN) 5 MG capsule TAKE 1 CAPSULE TWICE DAILY 180 capsule 3     potassium chloride SA (K-DUR/KLOR-CON M) 20 MEQ CR tablet Take 1 tablet (20 mEq) by mouth 2 times daily 180 tablet 3     atorvastatin (LIPITOR) 10 MG tablet Take 1 tablet (10 mg) by mouth daily 90 tablet 3     pramoxine (PROCTOFOAM) 1 % foam        Cholecalciferol ([employer] VITAMIN D3) 1000 UNITS CAPS Take 1,000 Units by mouth       furosemide (LASIX) 20 MG tablet TAKE 3 TABLETS EVERY  tablet 3     metolazone (ZAROXOLYN) 5 MG tablet TAKE 1 TABLET FIVE TIMES A WEEK 60 tablet 3     citalopram (CELEXA) 20  MG tablet Take 1 tablet (20 mg) by mouth daily 90 tablet 0     busPIRone (BUSPAR) 10 MG tablet Take 1 tablet (10 mg) by mouth 3 times daily 270 tablet 1     pregabalin (LYRICA) 100 MG capsule Take 1 mg by mouth 3 times daily Patient reports taking BID sometimes.  [facility]       Multiple Vitamin (MULTIVITAMINS PO) Take  by mouth.       Saw Palmetto, Serenoa repens, 450 MG CAPS Take 450 mg by mouth daily.       hydrocortisone (PROCTOSOL HC) 2.5 % rectal cream APPLY TO RECTAL AREA TWICE DAILY AS NEEDD 10 g 11     B Complex Vitamins (VITAMIN B COMPLEX) tablet take 1 Tab by mouth daily.       VITAMIN C 500 MG OR TABS 1 TABLET DAILY       [DISCONTINUED] metFORMIN (GLUCOPHAGE-XR) 500 MG 24 hr tablet TAKE TWO TABLETS BY MOUTH TWICE DAILY WITH MEALS 120 tablet 2     [DISCONTINUED] furosemide (LASIX) 40 MG tablet Take 40 mg by mouth daily       oxyCODONE-acetaminophen (PERCOCET) 5-325 MG per tablet Take 1-2 tablets by mouth every 6 hours as needed for pain 30 tablet 0     blood glucose monitoring ([employer] CONTOUR NEXT) test strip 1 strip by In Vitro route daily Use to test blood sugar 1times daily or as directed. 100 strip 3     amoxicillin (AMOXIL) 500 MG capsule TAKE FOUR CAPSULES BY MOUTH ONE HOUR BEFORE APPOINTMENT 12 capsule 1     Blood Glucose Calibration (CONTOUR NEXT CONTROL LEVEL 2) NORMAL SOLN 1 drop by In Vitro route as needed. Use to check each new box of test strips for accuracy. 1 each 3     Lancets (MICROLET) MISC Use to check blood glucose 1 time a day. 50 each 3     ORDER FOR DME Equipment being ordered: Oxygen.  Pt to have 1-2 liters O2 via NC to use with ambulation.  Pt hypoxic to sats of 85% on RA with ambulation. 1 each 0     ORDER FOR DME use as needed prn 1 0     OTC products: None, except as noted above    Allergies   Allergen Reactions     Gabapentin      Other reaction(s): HEARTBURN     No Known Allergies       Latex Allergy: NO    Social History   Substance Use Topics      Smoking status: Former Smoker     Types: Cigarettes     Quit date: 1/1/1981     Smokeless tobacco: Never Used      Comment: quit 1981     Alcohol use 0.0 oz/week     0 Standard drinks or equivalent per week      Comment: 6/year     History   Drug Use No       REVIEW OF SYSTEMS:                                                    Constitutional, neuro, ENT, endocrine, pulmonary, cardiac, gastrointestinal, genitourinary, musculoskeletal, integument and psychiatric systems are negative, except as otherwise noted.      EXAM:                                                    /82  Pulse 96  Temp 96.6  F (35.9  C) (Temporal)  Resp 16  Wt 213 lb (96.6 kg)  SpO2 (!) 89%  BMI 31.44 kg/m2    GENERAL APPEARANCE: healthy, alert and no distress     HENT: ear canals and TM's normal and nose and mouth without ulcers or lesions     NECK: no adenopathy, no asymmetry, masses, or scars and thyroid normal to palpation     RESP: lungs clear to auscultation - no rales, rhonchi or wheezes     CV: regular rates and rhythm, normal S1 S2, no S3 or S4 and no murmur, click or rub     ABDOMEN:  soft, nontender, no HSM or masses and bowel sounds normal     MS: extremities normal- no gross deformities noted, no evidence of inflammation in joints, FROM in all extremities.     SKIN: no suspicious lesions or rashes     NEURO: Normal strength and tone, sensory exam grossly normal, mentation intact and speech normal     PSYCH: mentation appears normal. and affect normal/bright     LYMPHATICS: No axillary, cervical, or supraclavicular nodes    DIAGNOSTICS:                                                    EKG: no LVH by voltage criteria, Right Bundle Branch Block, poor R wave progression, RBBB is new. No st change    Recent Labs   Lab Test 09/19/17 08/22/17 06/19/17   1659  06/02/17   1317  05/22/17   0910   03/06/17   1011   HGB   --    --    --    --   14.8  15.5   --    --    PLT   --    --    --    --   183  208   --    --    INR   1.2*  1.2*  1.6*   < >   --    --    --    < >   --    NA   --    --    --    --    --   139   --   138   POTASSIUM   --    --    --    --    --   3.7   --   3.8   CR   --    --    --    --    --   1.50*   --   1.66*   A1C   --    --    --   6.9*   --    --    --   7.6*    < > = values in this interval not displayed.      [facility], creatinine was 1.8 so therefore recheck today.   IMPRESSION:                                                    Reason for surgery/procedure: left lung nodule    The proposed surgical procedure is considered INTERMEDIATE risk.    REVISED CARDIAC RISK INDEX  The patient has the following serious cardiovascular risks for perioperative complications such as (MI, PE, VFib and 3  AV Block):  No serious cardiac risks  INTERPRETATION: 2 risks: Class III (moderate risk - 6.6% complication rate)    The patient has the following additional risks for perioperative complications:  No identified additional risks    No diagnosis found.    RECOMMENDATIONS:                                                        Pulmonary Risk  Incentive spirometry post op  Respiratory Therapy (Respiratory Care IP Consult)  post op  NG tube decompression if abdominal distension or significant vomiting         --Patient is to take all scheduled medications on the day of surgery EXCEPT for modifications listed below.    Diabetes Medication Use  -----Hold usual  oral diabetic meds (e.g. Metformin, Actos, Glipizide) while NPO.       APPROVAL GIVEN to proceed with proposed procedure, without further diagnostic evaluation       We had an extended visit to discuss his left lung nodule, the reason for the endoscopic ultrasound and the description of the procedure and recovery.    We also [facility] showing A1c of 6.6 and very good blood test of his sugars. His weight has been stable or is history of fluid overload with a weight between 202 and 204..    I spent greater than 50% of  this 45 minute visit in counseling and coordination of care of above      Signed Electronically by: Juliano Forbes MD    Copy of this evaluation report is provided to requesting physician.    [facility] Preop Guidelines

## 2021-04-06 DIAGNOSIS — E11.9 DIABETES MELLITUS, NEW ONSET (HCC): ICD-10-CM

## 2021-04-06 RX ORDER — BLOOD-GLUCOSE METER
KIT MISCELLANEOUS
Qty: 100 STRIP | Refills: 0 | Status: SHIPPED | OUTPATIENT
Start: 2021-04-06 | End: 2021-05-11

## 2021-04-06 NOTE — TELEPHONE ENCOUNTER
Next Visit Date:  Future Appointments   Date Time Provider Bradley Kramer   4/19/2021 10:00 AM Caryle Crazier, MD [facility]   4/20/2021 10:00 AM Briana Galeazzi, RN [facility]   4/30/2021 11:10 AM Gigi Moritz, MD [facility] None       Health Maintenance   Topic Date Due    Diabetic retinal exam  Never done    COVID-19 Vaccine (1) Never done    Shingles Vaccine (1 of 2) Never done    Low dose CT lung screening  Never done    A1C test (Diabetic or Prediabetic)  03/16/2021    Hepatitis A vaccine (1 of 2 - Risk 2-dose series) 01/18/2022 (Originally 3/26/1956)    Diabetic microalbuminuria test  06/11/2021    Lipid screen  12/16/2021    Potassium monitoring  12/16/2021    Creatinine monitoring  12/16/2021    Diabetic foot exam  12/18/2021    Colon cancer screen fecal DNA test (Cologuard)  12/08/2022    DTaP/Tdap/Td vaccine (2 - Td) 07/06/2028    Flu vaccine  Completed    Pneumococcal 0-64 years Vaccine  Completed    HIV screen  Completed    Hib vaccine  Aged Out    Meningococcal (ACWY) vaccine  Aged Out       Hemoglobin A1C (%)   Date Value   12/16/2020 10.6 (H)   05/21/2019 6.4   03/21/2019 6.6             ( goal A1C is < 7)   No results found for: LABMICR  LDL Cholesterol (mg/dL)   Date Value   12/16/2020 132 (H)   11/15/2019 110       (goal LDL is <100)   AST (U/L)   Date Value   12/16/2020 29     ALT (U/L)   Date Value   12/16/2020 37     BUN (mg/dL)   Date Value   12/16/2020 21     BP Readings from Last 3 Encounters:   01/18/21 120/82   12/18/20 124/78   12/03/20 128/80          (goal 120/80)    All Future Testing planned in [facility]  Lab Frequency Next Occurrence   Basic Metabolic Panel Every 16 weeks    CBC Auto Differential Every 16 weeks    Creatinine, Random Urine Every 16 weeks    Phosphorus Every 16 weeks    Protein / creatinine ratio, urine Every 16 weeks    PTH, INTACT WITH IONIZED CALCIUM Every 16 weeks    Protein, urine, random Every 16 weeks    Vitamin D 25 Hydroxy Every 16 weeks    Basic Metabolic Panel Every 16 weeks    CBC Auto Differential Every 16 weeks    Creatinine, Random Urine Every 16 weeks    Phosphorus Every 16 weeks    Protein / creatinine ratio, urine Every 16 weeks    Protein, urine, random Every 16 weeks    PTH, INTACT WITH IONIZED CALCIUM Every 16 weeks    Vitamin D 25 Hydroxy Every 16 weeks                Patient Active Problem List:     Elevated liver enzymes     Family history of coronary arteriosclerosis     History of alcohol abuse     History of tobacco abuse     Non-rheumatic mitral regurgitation     Renal insufficiency     Biliary cirrhosis (HCC)     Diastolic dysfunction     Low HDL (under 40)     Prediabetes     Pure hypercholesterolemia     Mild concentric left ventricular hypertrophy (LVH)     Hepatitis C antibody test positive     Chronic diastolic heart failure ([address]     Essential hypertension     Severe obesity with body mass index (BMI) of 35.0 to 39.9 with serious comorbidity ([address]     History of hepatitis C     Diabetes mellitus, new onset ([address]

## 2021-04-07 RX ORDER — ATORVASTATIN CALCIUM 20 MG/1
20 TABLET, FILM COATED ORAL DAILY
Qty: 30 TABLET | Refills: 3 | Status: SHIPPED | OUTPATIENT
Start: 2021-04-07 | End: 2021-04-07 | Stop reason: SDUPTHER

## 2021-04-07 RX ORDER — ATORVASTATIN CALCIUM 20 MG/1
20 TABLET, FILM COATED ORAL DAILY
Qty: 30 TABLET | Refills: 3 | Status: SHIPPED | OUTPATIENT
Start: 2021-04-07 | End: 2021-07-26

## 2021-04-07 RX ORDER — ATORVASTATIN CALCIUM 20 MG/1
20 TABLET, FILM COATED ORAL DAILY
Qty: 30 TABLET | Refills: 3 | OUTPATIENT
Start: 2021-04-07

## 2021-04-07 RX ORDER — GLIMEPIRIDE 2 MG/1
2 TABLET ORAL EVERY MORNING
Qty: 30 TABLET | Refills: 3 | Status: SHIPPED | OUTPATIENT
Start: 2021-04-07 | End: 2021-07-19 | Stop reason: ALTCHOICE

## 2021-04-07 NOTE — TELEPHONE ENCOUNTER
Last visit: 1/2021  Last Med refill: 12/2020  Does patient have enough medication for 72 hours:     Next Visit Date:  Future Appointments   Date Time Provider Bradley Nia   4/19/2021 10:00 AM MD Dwaine Blakely PC [facility]   4/20/2021 10:00 AM Sonja Colon RN [facility]   4/30/2021 11:10 AM Kwabena Kiser MD [facility] None       Health Maintenance   Topic Date Due    Diabetic retinal exam  Never done    COVID-19 Vaccine (1) Never done    Shingles Vaccine (1 of 2) Never done    Low dose CT lung screening  Never done    A1C test (Diabetic or Prediabetic)  03/16/2021    Hepatitis A vaccine (1 of 2 - Risk 2-dose series) 01/18/2022 (Originally 3/26/1956)    Diabetic microalbuminuria test  06/11/2021    Lipid screen  12/16/2021    Potassium monitoring  12/16/2021    Creatinine monitoring  12/16/2021    Diabetic foot exam  12/18/2021    Colon cancer screen fecal DNA test (Cologuard)  12/08/2022    DTaP/Tdap/Td vaccine (2 - Td) 07/06/2028    Flu vaccine  Completed    Pneumococcal 0-64 years Vaccine  Completed    HIV screen  Completed    Hib vaccine  Aged Out    Meningococcal (ACWY) vaccine  Aged Out       Hemoglobin A1C (%)   Date Value   12/16/2020 10.6 (H)   05/21/2019 6.4   03/21/2019 6.6             ( goal A1C is < 7)   No results found for: LABMICR  LDL Cholesterol (mg/dL)   Date Value   12/16/2020 132 (H)   11/15/2019 110       (goal LDL is <100)   AST (U/L)   Date Value   12/16/2020 29     ALT (U/L)   Date Value   12/16/2020 37     BUN (mg/dL)   Date Value   12/16/2020 21     BP Readings from Last 3 Encounters:   01/18/21 120/82   12/18/20 124/78   12/03/20 128/80          (goal 120/80)    All Future Testing planned in [facility]  Lab Frequency Next Occurrence   Basic Metabolic Panel Every 16 weeks    CBC Auto Differential Every 16 weeks    Creatinine, Random Urine Every 16 weeks    Phosphorus Every 16 weeks    Protein / creatinine ratio, urine Every 16 weeks    PTH, INTACT WITH IONIZED CALCIUM Every 16 weeks    Protein, urine, random Every 16 weeks    Vitamin D 25 Hydroxy Every 16 weeks    Basic Metabolic Panel Every 16 weeks    CBC Auto Differential Every 16 weeks    Creatinine, Random Urine Every 16 weeks    Phosphorus Every 16 weeks    Protein / creatinine ratio, urine Every 16 weeks    Protein, urine, random Every 16 weeks    PTH, INTACT WITH IONIZED CALCIUM Every 16 weeks    Vitamin D 25 Hydroxy Every 16 weeks                Patient Active Problem List:     Elevated liver enzymes     Family history of coronary arteriosclerosis     History of alcohol abuse     History of tobacco abuse     Non-rheumatic mitral regurgitation     Renal insufficiency     Biliary cirrhosis (HCC)     Diastolic dysfunction     Low HDL (under 40)     Prediabetes     Pure hypercholesterolemia     Mild concentric left ventricular hypertrophy (LVH)     Hepatitis C antibody test positive     Chronic diastolic heart failure ([address]     Essential hypertension     Severe obesity with body mass index (BMI) of 35.0 to 39.9 with serious comorbidity ([address]     History of hepatitis C     Diabetes mellitus, new onset ([facility]

## 2021-04-07 NOTE — TELEPHONE ENCOUNTER
Last visit: 1/18/21   Last Med refill:12/2020  Does patient have enough medication for 72 hours:     Next Visit Date:  Future Appointments   Date Time Provider Bradley Kramer   4/19/2021 10:00 AM MD Dwaine Pathak [facility]   4/20/2021 10:00 AM Steph Vasquez RN [facility]   4/30/2021 11:10 AM Janae Ayala MD [facility] None       Health Maintenance   Topic Date Due    Diabetic retinal exam  Never done    COVID-19 Vaccine (1) Never done    Shingles Vaccine (1 of 2) Never done    Low dose CT lung screening  Never done    A1C test (Diabetic or Prediabetic)  03/16/2021    Hepatitis A vaccine (1 of 2 - Risk 2-dose series) 01/18/2022 (Originally 3/26/1956)    Diabetic microalbuminuria test  06/11/2021    Lipid screen  12/16/2021    Potassium monitoring  12/16/2021    Creatinine monitoring  12/16/2021    Diabetic foot exam  12/18/2021    Colon cancer screen fecal DNA test (Cologuard)  12/08/2022    DTaP/Tdap/Td vaccine (2 - Td) 07/06/2028    Flu vaccine  Completed    Pneumococcal 0-64 years Vaccine  Completed    HIV screen  Completed    Hib vaccine  Aged Out    Meningococcal (ACWY) vaccine  Aged Out       Hemoglobin A1C (%)   Date Value   12/16/2020 10.6 (H)   05/21/2019 6.4   03/21/2019 6.6             ( goal A1C is < 7)   No results found for: LABMICR  LDL Cholesterol (mg/dL)   Date Value   12/16/2020 132 (H)   11/15/2019 110       (goal LDL is <100)   AST (U/L)   Date Value   12/16/2020 29     ALT (U/L)   Date Value   12/16/2020 37     BUN (mg/dL)   Date Value   12/16/2020 21     BP Readings from Last 3 Encounters:   01/18/21 120/82   12/18/20 124/78   12/03/20 128/80          (goal 120/80)    All Future Testing planned in [facility]  Lab Frequency Next Occurrence   Basic Metabolic Panel Every 16 weeks    CBC Auto Differential Every 16 weeks    Creatinine, Random Urine Every 16 weeks    Phosphorus Every 16 weeks    Protein / creatinine ratio, urine Every 16 weeks    PTH, INTACT

## 2021-04-19 ENCOUNTER — OFFICE VISIT (OUTPATIENT)
Dept: FAMILY MEDICINE CLINIC | Age: 66
End: 2021-04-19
Payer: COMMERCIAL

## 2021-04-19 VITALS
SYSTOLIC BLOOD PRESSURE: 118 MMHG | OXYGEN SATURATION: 95 % | BODY MASS INDEX: 37.59 KG/M2 | WEIGHT: 248 LBS | HEART RATE: 75 BPM | DIASTOLIC BLOOD PRESSURE: 79 MMHG | HEIGHT: 68 IN | TEMPERATURE: 97.3 F

## 2021-04-19 DIAGNOSIS — N18.30 CKD STAGE 3 DUE TO TYPE 2 DIABETES MELLITUS (HCC): ICD-10-CM

## 2021-04-19 DIAGNOSIS — Z79.4 CONTROLLED TYPE 2 DIABETES MELLITUS WITHOUT COMPLICATION, WITH LONG-TERM CURRENT USE OF INSULIN (HCC): Primary | ICD-10-CM

## 2021-04-19 DIAGNOSIS — E78.2 MIXED HYPERLIPIDEMIA: ICD-10-CM

## 2021-04-19 DIAGNOSIS — Z87.891 PERSONAL HISTORY OF TOBACCO USE: ICD-10-CM

## 2021-04-19 DIAGNOSIS — E11.22 CKD STAGE 3 DUE TO TYPE 2 DIABETES MELLITUS (HCC): ICD-10-CM

## 2021-04-19 DIAGNOSIS — I10 ESSENTIAL HYPERTENSION: ICD-10-CM

## 2021-04-19 DIAGNOSIS — E11.9 CONTROLLED TYPE 2 DIABETES MELLITUS WITHOUT COMPLICATION, WITH LONG-TERM CURRENT USE OF INSULIN (HCC): Primary | ICD-10-CM

## 2021-04-19 LAB — HBA1C MFR BLD: 7.3 %

## 2021-04-19 PROCEDURE — 83036 HEMOGLOBIN GLYCOSYLATED A1C: CPT | Performed by: STUDENT IN AN ORGANIZED HEALTH CARE EDUCATION/TRAINING PROGRAM

## 2021-04-19 PROCEDURE — 99214 OFFICE O/P EST MOD 30 MIN: CPT | Performed by: STUDENT IN AN ORGANIZED HEALTH CARE EDUCATION/TRAINING PROGRAM

## 2021-04-19 PROCEDURE — G0296 VISIT TO DETERM LDCT ELIG: HCPCS | Performed by: STUDENT IN AN ORGANIZED HEALTH CARE EDUCATION/TRAINING PROGRAM

## 2021-04-19 PROCEDURE — 3051F HG A1C>EQUAL 7.0%<8.0%: CPT | Performed by: STUDENT IN AN ORGANIZED HEALTH CARE EDUCATION/TRAINING PROGRAM

## 2021-04-19 RX ORDER — PEN NEEDLE, DIABETIC 31 GX5/16"
NEEDLE, DISPOSABLE MISCELLANEOUS
COMMUNITY
Start: 2021-01-26 | End: 2021-07-19

## 2021-04-19 RX ORDER — INSULIN LISPRO 200 [IU]/ML
INJECTION, SOLUTION SUBCUTANEOUS
Qty: 2 PEN | Refills: 3 | Status: SHIPPED | OUTPATIENT
Start: 2021-04-19 | End: 2021-12-08

## 2021-04-19 RX ORDER — LANCETS 28 GAUGE
EACH MISCELLANEOUS
Qty: 100 EACH | Refills: 0 | Status: SHIPPED | OUTPATIENT
Start: 2021-04-19 | End: 2021-05-24

## 2021-04-19 RX ORDER — INSULIN GLARGINE 100 [IU]/ML
15 INJECTION, SOLUTION SUBCUTANEOUS NIGHTLY
Qty: 5 PEN | Refills: 3 | Status: SHIPPED | OUTPATIENT
Start: 2021-04-19 | End: 2021-08-18 | Stop reason: SDUPTHER

## 2021-04-19 ASSESSMENT — ENCOUNTER SYMPTOMS
SORE THROAT: 0
SHORTNESS OF BREATH: 0
CONSTIPATION: 0
ABDOMINAL PAIN: 0
NAUSEA: 0
CHEST TIGHTNESS: 0
EYE DISCHARGE: 0
COUGH: 0
DIARRHEA: 0
VOMITING: 0
WHEEZING: 0

## 2021-04-19 NOTE — PROGRESS NOTES
[address] PRIMARY CARE  [address]  Dept: [phone]  Dept Fax: [phone]    Richard Foss is a 61 y.o. male who is a Established patient, who presents today for his medical conditions/complaints as noted below:  Chief Complaint   Patient presents with    [facility]    Diabetes         HPI:     Here today to follow-up on diabetes and hypertension. His last A1c was 10.6 and he has been taking Lantus 15 units at bedtime along with glimepiride 2 mg in the morning. He is on sliding scale and says that he has not needed to use more than 2 units at a time for blood sugars above 150. He brought his blood sugar log and most of his readings are below 170. He has been following diabetic diet and trying to cut down sugars and carb. Has been more physically active and trying to ride his bike every day. He does mention a couple episodes of hypoglycemia over past few months. Says that he started sweating and felt weak, his blood sugars were below 80. He says that he ate something and felt better afterwards. His blood pressure has been well controlled on current medications. His last lipid levels were elevated and he was put on atorvastatin 20 mg daily. Says that he was on a different cholesterol medication before that. He has stage III kidney disease and follows with nephrologist Dr. James Hawthorne. He is a former smoker, quit 5 years ago. Due for lung cancer screening.       Hemoglobin A1C (%)   Date Value   04/19/2021 7.3   12/16/2020 10.6 (H)   05/21/2019 6.4             ( goal A1Cis < 7)   No results found for: LABMICR  LDL Cholesterol (mg/dL)   Date Value   12/16/2020 132 (H)   11/15/2019 110   05/07/2019 114       (goal LDL is <100)   AST (U/L)   Date Value   12/16/2020 29     ALT (U/L)   Date Value   12/16/2020 37     BUN (mg/dL)   Date Value   12/16/2020 21     BP Readings from Last 3 Encounters:   04/19/21 118/79   01/18/21 120/82   12/18/20 kit (FREESTYLE) monitoring kit 1 kit by Does not apply route daily 1 kit 0    metoprolol tartrate (LOPRESSOR) 25 MG tablet take 1 tablet by mouth twice a day       No current facility-administered medications for this visit. No Known Allergies    Health Maintenance   Topic Date Due    Diabetic retinal exam  Never done    COVID-19 Vaccine (1) Never done    Shingles Vaccine (1 of 2) Never done    Low dose CT lung screening  Never done    Hepatitis A vaccine (1 of 2 - Risk 2-dose series) 01/18/2022 (Originally 3/26/1956)    Diabetic microalbuminuria test  06/11/2021    A1C test (Diabetic or Prediabetic)  07/19/2021    Lipid screen  12/16/2021    Potassium monitoring  12/16/2021    Creatinine monitoring  12/16/2021    Diabetic foot exam  12/18/2021    Colon cancer screen fecal DNA test (Cologuard)  12/08/2022    DTaP/Tdap/Td vaccine (2 - Td) 07/06/2028    Flu vaccine  Completed    Pneumococcal 0-64 years Vaccine  Completed    HIV screen  Completed    Hib vaccine  Aged Out    Meningococcal (ACWY) vaccine  Aged Out       Subjective:     Review of Systems   Constitutional: Negative for appetite change, fatigue and fever. HENT: Negative for congestion, ear pain, hearing loss and sore throat. Eyes: Negative for discharge and visual disturbance. Respiratory: Negative for cough, chest tightness, shortness of breath and wheezing. Cardiovascular: Negative for chest pain, palpitations and leg swelling. Gastrointestinal: Negative for abdominal pain, constipation, diarrhea, nausea and vomiting. Genitourinary: Negative for flank pain, frequency, hematuria and urgency. Musculoskeletal: Negative for arthralgias, gait problem, joint swelling and myalgias. Skin: Negative. Neurological: Negative for dizziness, weakness, numbness and headaches. Psychiatric/Behavioral: Negative. Negative for dysphoric mood. The patient is not nervous/anxious.         Objective:     Physical Exam  Vitals signs reviewed. Constitutional:       Appearance: Normal appearance. He is normal weight. HENT:      Head: Normocephalic and atraumatic. Nose: Nose normal.      Mouth/Throat:      Mouth: Mucous membranes are moist.      Pharynx: Oropharynx is clear. Eyes:      Extraocular Movements: Extraocular movements intact. Conjunctiva/sclera: Conjunctivae normal.      Pupils: Pupils are equal, round, and reactive to light. Neck:      Musculoskeletal: Normal range of motion and neck supple. Cardiovascular:      Rate and Rhythm: Normal rate and regular rhythm. Heart sounds: Normal heart sounds. No murmur. No gallop. Pulmonary:      Effort: Pulmonary effort is normal. No respiratory distress. Breath sounds: Normal breath sounds. No stridor. No wheezing. Abdominal:      General: Bowel sounds are normal. There is no distension. Palpations: Abdomen is soft. Tenderness: There is no abdominal tenderness. There is no guarding or rebound. Musculoskeletal: Normal range of motion. General: No swelling or tenderness. Skin:     General: Skin is warm and dry. Coloration: Skin is not jaundiced. Findings: No rash. Neurological:      General: No focal deficit present. Mental Status: He is alert and oriented to person, place, and time. Psychiatric:         Mood and Affect: Mood normal.         Behavior: Behavior normal.         Thought Content: Thought content normal.         Judgment: Judgment normal.       /79   Pulse 75   Temp 97.3 °F (36.3 °C)   Ht 5' 8\" (1.727 m)   Wt 248 lb (112.5 kg)   SpO2 95%   BMI 37.71 kg/m²     Assessment/Plan:   1. Controlled type 2 diabetes mellitus without complication, with long-term current use of insulin ([facility])  -     FreeStyle Lancets MISC; Disp-100 each, R-0, Normaluse 1 LANCET to TEST BLOOD SUGAR three times a day  -     insulin glargine (LANTUS SOLOSTAR) 100 UNIT/ML injection pen;  Inject 15 Units into the skin nightly, Disp-5 pen, R-3Normal  -     POCT glycosylated hemoglobin (Hb A1C)  -     insulin lispro (HUMALOG KWIKPEN) 200 UNIT/ML SOPN pen; qAC sliding scale 0-150=0 units 151-200=2 units 201-250=4 units 251-300=6 units 301-350= 8 units 351+ = 10 units max: 30 units, Disp-2 pen, R-3Normal  2. Essential hypertension  3. Mixed hyperlipidemia  -     Lipid Panel; Future  4. Personal history of tobacco use  -     [facility] VISIT TO DISCUSS LUNG CA SCREEN W LDCT  -     CT Lung Screen (Annual); Future  5. CKD stage 3 due to type 2 diabetes mellitus ([facility]    1. Diabetes-POCT A1c today 7.3. Continue Lantus 15 units at bedtime, glimepiride 2 mg daily and sliding scale. Advised to continue to monitor blood sugars. Gets regular eye exams. Following with diabetic education program.    2.  Hypertension-controlled. On metoprolol 25 mg twice daily. 3.  Hyperlipidemia-on atorvastatin 20 mg daily. Last lipid levels elevated, will recheck lipids. 4.  CKD stage III-following with nephrology Dr. Mirza Gongora. Return in about 3 months (around 2021) for Follow up DM/HTN. Orders Placed This Encounter   Procedures    CT Lung Screen (Annual)     Age: Patient is 61 y.o. Smoking History: Social History    Tobacco Use      Smoking status: Former Smoker        Packs/day: 1.50        Years: 40.00        Pack years: 61        Start date: 1977        Quit date: 2015        Years since quittin.0      Smokeless tobacco: Never Used      Tobacco comment: quit      Alcohol use: Yes      Alcohol/week: 7.0 standard drinks      Types: 7 Cans of beer per week      Comment: 7 beers/w; ; prev drinking 3-4 X/wk, apprx 36 y    Drug use: No   Pack years: 61    Date of last lung cancer screening: No previous lung cancer screening exam     Standing Status:   Future     Standing Expiration Date:   10/19/2022     Order Specific Question:   Is there documentation of shared decision making?      Answer:   Yes     Order Specific Question:   Is this a low dose CT or a routine CT? Answer:   Low Dose CT [1]     Order Specific Question:   Is this the first (baseline) CT or an annual exam?     Answer: Annual [2]     Order Specific Question:   Does the patient show any signs or symptoms of lung cancer? Answer:   No     Order Specific Question:   Smoking Status? Answer: Former Smoker [4]     Order Specific Question:   Date quit smoking? (must be within 15 years)     Answer:   4/20/2015     Order Specific Question:   Smoking packs per day? Answer:   1.5     Order Specific Question:   Years smoking? Answer:   40    Lipid Panel     Standing Status:   Future     Standing Expiration Date:   7/19/2021     Order Specific Question:   Is Patient Fasting?/# of Hours     Answer: Yes    POCT glycosylated hemoglobin (Hb A1C)    GA VISIT TO DISCUSS LUNG CA SCREEN W LDCT     Orders Placed This Encounter   Medications    FreeStyle Lancets MISC     Sig: use 1 LANCET to TEST BLOOD SUGAR three times a day     Dispense:  100 each     Refill:  0    insulin glargine (LANTUS SOLOSTAR) 100 UNIT/ML injection pen     Sig: Inject 15 Units into the skin nightly     Dispense:  5 pen     Refill:  3    insulin lispro (HUMALOG KWIKPEN) 200 UNIT/ML SOPN pen     Sig: qAC sliding scale 0-150=0 units 151-200=2 units 201-250=4 units 251-300=6 units 301-350= 8 units 351+ = 10 units max: 30 units     Dispense:  2 pen     Refill:  3       Patient given educational materials - see patient instructions. Discussed use, benefit, and side effects of prescribed medications. All patientquestions answered. Pt voiced understanding. Reviewed health maintenance. Instructedto continue current medications, diet and exercise. Patient agreed with treatmentplan. Follow up as directed.      Electronically signed by Micky Nieto MD on 4/19/2021 at 10:59 AM  Low Dose CT (LDCT) Lung Screening criteria met   Age 50-69   Pack year smoking >30   Still smoking or less than 15 year since quit   No sign or symptoms of lung cancer   > 11 months since last LDCT     Risks and benefits of lung cancer screening with LDCT scans discussed:    Significance of positive screen - False-positive LDCT results often occur. 95% of all positive results do not lead to a diagnosis of cancer. Usually further imaging can resolve most false-positive results; however, some patients may require invasive procedures. Over diagnosis risk - 10% to 12% of screen-detected lung cancer cases are over diagnosed--that is, the cancer would not have been detected in the patient's lifetime without the screening. Need for follow up screens annually to continue lung cancer screening effectiveness     Risks associated with radiation from annual LDCT- Radiation exposure is about the same as for a mammogram, which is about 1/3 of the annual background radiation exposure from everyday life. Starting screening at age 54 is not likely to increase cancer risk from radiation exposure. Patients with comorbidities resulting in life expectancy of < 10 years, or that would preclude treatment of an abnormality identified on CT, should not be screened due to lack of benefit.     To obtain maximal benefit from this screening, smoking cessation and long-term abstinence from smoking is critical

## 2021-04-20 ENCOUNTER — HOSPITAL ENCOUNTER (OUTPATIENT)
Dept: DIABETES SERVICES | Age: 66
Setting detail: THERAPIES SERIES
Discharge: HOME | End: 2021-04-20
Payer: COMMERCIAL

## 2021-04-20 DIAGNOSIS — E11.9 DIABETES MELLITUS, NEW ONSET (HCC): Primary | ICD-10-CM

## 2021-04-20 PROCEDURE — G0108 DIAB MANAGE TRN  PER INDIV: HCPCS

## 2021-04-20 ASSESSMENT — PROBLEM AREAS IN DIABETES QUESTIONNAIRE (PAID)
FEELING DEPRESSED WHEN YOU THINK ABOUT LIVING WITH DIABETES: 0
FEELING THAT DIABETES IS TAKING UP TOO MUCH OF YOUR MENTAL AND PHYSICAL ENERGY EVERY DAY: 1

## 2021-04-20 NOTE — PROGRESS NOTES
I would be okay with discontinuing glimepiride at this point if you think he is able to keep a check on his blood sugars. He is only on 2 mg of glimepiride.

## 2021-04-20 NOTE — LETTER
[facility]  [facility] [address]  [facility] [address]  Phone: [phone]         April 20, 2021    To :Jose Spicer MD    From: Omar Islas RN    Patient: Huma Moore   YOB: 1955   Date of Visit: 4/20/21     The following content has been reviewed since the last assessment: Managing Diabetes (Carb counting, monitoring, medications, physical activity)  Balancing Diabetes (Meal planning, using BG results, identifying BG targets)  Reducing Risks  Problem Solving  Insulin Management  Follow up assessment :   Meeting goals - in Jan 2021 - patient was new to checking BG, taking insulin and oral medications, Diabetic healthy eating plan and increasing his activity. He is doing well in all these area now - he is checking BG and log it 3 X per day - he can use BG data to determine if need to use pre meal insulin on correction scale, he is taking oral medication for DM daily am and is using lantus 15 sq injections  once daily at night. He has had to cope with low BG during the day, if more active and late snack ( he is eating 3 meal per day) He does now carry glucose tabs with him when out. He may benefit from lower dose of glimepiride in am as his activity increases. An ongoing plan was created to include:follow up in 3 months with RD to review diet and renal diet with diabetes    Post Program Self Management Support Plans include:  Diabetes Care Appointments with MD, Community Programs/Support Groups and Annual Review    Thank you for the opportunity to provide Diabetes Self Management Education to your patient.

## 2021-04-20 NOTE — PROGRESS NOTES
This visit is a TeleHealth encounter (During IFOXW-99 public health emergency). Pursuant to the emergency declaration under the [address] [place] [address] and the [employer] Act, this Virtual Visit was conducted with patient's (and/or legal guardian's) consent, to reduce the patient's risk of exposure to COVID-19 and provide necessary medical care. The patient (and/or legal guardian) has also been advised to contact this office for worsening conditions or problems, and seek emergency medical treatment and/or call 911 if deemed necessary. Patient identification was verified at the start of the visit: Yes    Total time spent for this encounter: 1 hour    - this encounter was done as one on one  phone visit as no group sessions being offered for 2 months due to covid - 19 pandemic  Services were provided through a video synchronous discussion virtually to substitute for in-person clinic visit. Patient and provider were located at their individual home/office. Diabetes Self- Management Education Program Assessment -   Also see Diabetic Screening  Patient, Олег Newman,  here for diabetes self-management education  visit/ assessment. Today's visit was in an individual setting. MEDICAL HISTORY:  Past Medical History:   Diagnosis Date    Hepatitis C     Hypertension     Liver disease     sees GI, treated for Hep C     Non-rheumatic mitral regurgitation     sees cardio,    Renal insufficiency 6/26/2018    Severe obesity (BMI 35.0-39.9) 9/11/2018    Ventricular tachycardia ([facility] [address]      Family History   Problem Relation Age of Onset    Breast Cancer Mother     Cancer Mother     Coronary Art Dis Father         fatal MI 74     Patient has no known allergies.    Immunization History   Administered Date(s) Administered    Influenza, Quadv, IM, (6 mo and older Fluzone, Flulaval, Fluarix and 3 yrs and older Afluria) 10/23/2018    Influenza, Dene Schlein, IM, PF (6 mo and older Fluzone, Flulaval, Fluarix, and 3 yrs and older Afluria) 11/21/2019, 09/24/2020    Pneumococcal Polysaccharide ([id number]) 12/11/2018    Tdap (Boostrix, Adacel) 07/06/2018     Current Medications  Current Outpatient Medications   Medication Sig Dispense Refill    B-D UF III MINI PEN NEEDLES 31G X 5 MM MISC use 1 NEEDLE four times a day WITH HUMALOG AND LANTUS      FreeStyle Lancets MISC use 1 LANCET to TEST BLOOD SUGAR three times a day 100 each 0    insulin glargine (LANTUS SOLOSTAR) 100 UNIT/ML injection pen Inject 15 Units into the skin nightly 5 pen 3    insulin lispro (HUMALOG KWIKPEN) 200 UNIT/ML SOPN pen qAC sliding scale 0-150=0 units 151-200=2 units 201-250=4 units 251-300=6 units 301-350= 8 units 351+ = 10 units max: 30 units 2 pen 3    glimepiride (AMARYL) 2 MG tablet Take 1 tablet by mouth every morning 30 tablet 3    atorvastatin (LIPITOR) 20 MG tablet Take 1 tablet by mouth daily 30 tablet 3    FREESTYLE LITE strip USE 1 TEST STRIP TO TEST BLOOD SUGAR 3 TIMES DAILY AS NEEDED FOR SYMPTOMS OF IRREGULAR BLOOD GLUCOSE. 100 strip 0    Blood Glucose Monitoring Suppl (FREESTYLE LITE) ELE use as directed      glucose monitoring kit (FREESTYLE) monitoring kit 1 kit by Does not apply route daily 1 kit 0    metoprolol tartrate (LOPRESSOR) 25 MG tablet take 1 tablet by mouth twice a day       No current facility-administered medications for this encounter.    :     Comments:  Allergies:  No Known Allergies      A1C blood level - at goal < 7%   Lab Results   Component Value Date    LABA1C 7.3 04/19/2021    LABA1C 10.6 (H) 12/16/2020    LABA1C 6.4 05/21/2019     Lab Results   Component Value Date    GLUF 268 (H) 12/16/2020    CREATININE 1.31 (H) 12/16/2020       Blood pressure ( 130/ 80)  Or less  BP Readings from Last 3 Encounters:   04/19/21 118/79   01/18/21 120/82   12/18/20 124/78        Cholesterol ( LDL under  100)   Lab Results Component Value Date    LDLCHOLESTEROL 132 (H) 12/16/2020       Diabetes Self- Management Education Record    Participant Name: Olivia Wyatt  Referring Provider: Laurence Roth MD   Assessment/Evaluation Ratings:  1=Needs Instruction   4=Demonstrates Understanding/Competency  2=Needs Review   NC=Not Covered    3=Comprehends Key Points  N/A=Not Applicable  Topics/Learning Objectives Pre-session Assess Date:  1/4/21rs  Instr. Date Reinforce Date Post- session Eval  4/20/21rs  Comments   Diabetes disease process & Treatment process: Define diabetes & pre-diabetes; Identify own type of diabetes; role of the pancreas; signs/symptoms; diagnostic criteria; prevention & treatment options; contributing factors. 1 1/11/21rs  1/25/21CB 2 - 1/4/21rs-  new dx of type 2 dm with pcp visit 12/2020- has some symptoms of thirst and frequent urination   - 1/4/21rs -  - hx of hep C ( remission) hx of ckd stage 3,  hx if MVR    Reviewed progression of T2DM. Pt hoping to get off insulin. Incorporating nutritional management into lifestyle: Describe effect of type, amount & timing of food on blood glucose; Describe basic meal planning techniques & current nutrition guideline   Bk meal at 9 am   Cereal and milk and banana or vale or egg or go mcdonals    YUVAL - orange or sandwithces or skipped    Dn- fz meals pot pies - meat loaf and mash pot  fz pizza   Not big eater or cook    - now retired lives alone and does not cook much     - snacking food cookies - willing to give up       Mostly non caloric fluids like water - some milk - cutting back on juices      -1/11/21rs   Stated he got rid of cookies and junk food in home   [place] ([facility]) lunch meat sandwiches  Go to deli meals   Now drinking more water  1/20/21 JW- used dinner plate guide from [employer] book and gave suggestions within current routine. . Emphasis on eating something at lunch and including more free veges.     - 2/3/21rs now eating smaller portions and looking at recipe book that we sent   - rare to drink etoh - limits intake now 2/3/21rs 2 What to eat - Food groups, When to eat - timing of meals and snacks, and How much to eat - portions control. Suggested 1600-200 calories/ day bc pt asked   CHO choices/ meal 3-4/meal, 1/snack   CHO choices/  day   grams of protein /day   gram of fat /day     Correctly read food labels & demonstrate CHO counting & portion control with personalized meal plan. Identify dining out strategies, & dietary sick day guidelines. 1 1/20/21 JW 2/3/21 rs 2 4/20/21rs - concern for CKD - stage 3 - plan for follow up with RD    Incorporating physical activity into lifestyle:   Verbalize effect of exercise on blood glucose levels; benefits of regular exercise; safety considerations; contraindications; maintenance of activity. 1 1/11/21rs   3  - 1/4/21 - rs less active now - retired -- worked for [employer] and worked for [employer]  outdoors  -1/11/21 - staying active in home - stated like to ride bike - and [place]  - discussed strategies for winter indoor activity 2/3/21rs   4/20/21- now being more active - started to ride bike outdoors     Using medications safely:  Identify effects of diabetes medicines on blood glucose levels; List diabetes medication taken, action & side effects;    1 1/25/21CB  3 1/4/21rs - Started amaryl 2 mg daily in Dec 2020     Insulin / Injectable - Appropriate injection sites; proper storage; supplies needed; proper technique; safe needle disposal guidelines.    1 1/4/21rs  1/25/21CB 3 1/4/21rs Starting insulin pens: lantus 20 units  Once daily bedtime and Humalog per correction  ( 2 for 50 over 150 ) per meals meals, demo of insulin pen with return demo and detailed explanation and written plan for insulin - BD getting started booklet      Pt states that he has 3 of same pens that says Lantus Lindaar on it with light gray and purple and uses that same pen for his ISS but has not had to use much past week because BG staying   Reviewed insulin action of Lantus and Humalog. Referred to page 23 in How to Thrive ADA book. Patient was unaware he was to have 2 different kinds of pens. Pt instructed to only take the Lantus at bedtime using the 15 units as ordered and writer would contact Pharmacy about why he did not get the Humalog. [facility] contacted and writer spoke with Sydney Ovalle who was waiting for PCP to clarify amount of total daily insulin and how often to use ISS. Sydney Ovalle states he will contact PCP again to clarify. Patient uses abd area and encouraged to rotate. 1/25/21CB  -2/3/21 - now has Humalog pens and using    Monitoring blood glucose, interpreting and using results:  Identify recommended & personal blood glucose targets; importance of testing; testing supplies; HgbA1C target levels; Factors affecting blood glucose; Importance of logging blood glucose levels for pattern recognition; ketone testing; safe lancet disposal.   1 1/4/21rs 1/25/21CB 3 1/4/21rs New  To BG checks - taught how to use contour next meter with 10 starter pack  - note to PCP to order a meter through the insurance / send to pharmacy  BG today 225     - new freestyle meter meter at [facility] :  - fasting  - 112 - 117 - 128 - 134 - 160 ( with 20 units lantus)   - only needed correction scale - X 2 this week    BG have been 105 this am -124 in am and  pre lunch and 69 141 pre dinner. Pt does not check 2hpp from dinner. BG much improved and was told if using Humalog scale as ordered once he receive it causes low BG to call the office right away as he may not need ISS.1/25/21CB   Prevention, detection & treatment of acute complications:  Identify symptoms of hyper & hypoglycemia, and prevention & treatment strategies. 1 1/11/21rs  3 -- 1/4/21rs  reviewed how insulin works and risk for low BG with insulin and rule of 15 reviewed -    Describe sick day guidelines & indications for  physician notification.  Identify short term consequences of poor control. Disaster preparedness strategies    1 2/3/21 rs  3 --1/11/21 - discussed sick day care and risk of  High blood  -- patient stated his niece helped to get get signed up for covid vaccination  -4/20/21rs - had both doses of covid vaccination - had Feb 2021 had 2nd shot    Prevention, detection & treatment of chronic complications:  Define the natural course of diabetes & describe the relationship of blood glucose levels to long term complications of diabetes. Identify preventative measures & standards of care. 1 1/25/21CB  2 1/4/21rs  - has PCP care every 6 mo - kidney md every 6 mo - cardio for MVR and cholesterol every year - eye Md every 3 - 6 months ( has issue with left eye may - aug 2020)   - hep c in remission does not see liver md now -   1/11/21stated he will get covid -23  - shot  When available stated his name is on the list    Pt saw eye Dr past summer due to strained muscle in eye that caused his pupil to become fixed  Sees dentist 2-3x/year1/25/21CB    - 2/3/21 rs - has follow up cardio - and stated he is good to follow up in one more year    4/20/21- has eye appt for June 21 st -  exam for diabetic eye exam   Developing strategies to address psychosocial issues:  Describe feelings about living with diabetes; Describe how stress, depression & anxiety affect blood glucose; Identify coping strategies; Identify support needed & support network available. 1 1/25/21CB   1/4/21rs - ID a lot of info to learn with new skills for BG and insulin use as well at dx of dm     Patient very appreciative of classes as he was unaware of how his medications worked and did not know he was taking the Lantus incorrectly. Developing strategies to promote health/change behavior: Identify 7 self-care behaviors; Personal health risk factors; Benefits, challenges & strategies for behavioral change;    1 2/3/21 rs        Individualized goal selection. My goal , to help me improve my health, I will:   1. Healthy eating - Use plate method for balance / eat less starches and more veggies  - avoid skipping meals -- 4/20/21rs - meeting this goal  -Learn more about Renal and diabetic diet plan    2. Monitoring- start to check BG fasting and pre meal - log BG and insulin use in log book/ home note book--- 4/20/21rs meeting goal 100%     3. Medications - start to take insulins as rx plan - lantus and humalog   --4/20/21 meeting this goal 100%     4. Prevent complications- carry with him glucose tabs when out  - take if feeling low       Plan  Follow-up Appointments planned in individual setting. Next Appointment in 12 weeks. Follow up planned via phone call, patient does not have computer at home. Instruction Method: [x]Lecture/Discussion  []Power Point Presentation  []Handouts  []Return Demonstration      Education Materials/Equipment Provided:    [x]Self-Management - Initial assessment - Enrolment in to ADA  Where do I Begin, Living with Type 2 diabetes\"  ADA home support program and handout for no concentrated sweets and diet meal planning basics, handout on diabetes education classes.      [x]Self-Management  Class 1 - \"How to Thrive: A guide for Your journey with Diabetes\" - ADA booklet 2020  - pages 4, 11- 15 , 18 -19 -- 1/11/21rs    [x] Self-Management  Class 2 - Meal Plan and handout for serving sizes, smarter snacking, Ready Set Carb Counting / Plate Method, Nutrient Conversion and International Diabetes [address] Eating for People with Diabetes and Nutrition in the [employer] - fast facts about fast food -How to Thrive: A guide for Your journey with Diabetes\" - ADA booklet 2020  - pages 16 -171/20/21 JW    [x] Self-Management  Class 3 -  Diabetes ID card,  foot care tips sheet,  Individualized Diabetes report card - \"How to Thrive: A guide for Your journey with Diabetes\" - ADA booklet 2020 page 6- 9  and 20 - 351/25/21CB  Mailed out report card, GLP-1 handout and Know your numbers    [x] Self-Management Class 4 - BD Booklet  Sick Day Rules and  Dinning Out Guide , recipe hand outs and tips, diabetes Cookbooks  ( when available) How to Thrive: A guide for Your journey with Diabetes\" - ADA booklet 2020  - pages 36 -39 -2/3/21 rs  -  [x]Self-Management -  Self-Management - 3 month follow -  AADE7 Self care behaviors work sheets,   How to Thrive: A guide for Your journey with Diabetes\" - ADA booklet 2020  - pages 44. []Self-Management  Gestational - RN class -Gestational Diabetes Mellitus ( GDM) toolkit form [place] gestational diabetes postpartum care learning collaborative 2018. Gestational diabetes handout from [facility] jan 2016  \"Simple Guidelines for meal planning with gestational diabetes\" handout 3 meals and 3 snacks  SMBG sheets to fax back to MFM weekly  BD  healthy injection site selection and rotation with 6 mm insulin syringe and 4 mm pen needle  Did you have gestational diabetes when you were pregnant?  Handout from [facility]  April 2014    []Self-Management Gestational - RD class - My Food Plan for Gestational diabetes    []Glucose Meter     []Insulin Kit     []Other      Encounter Type Date Start Time End Time Comments No Show Dates   Assessment 1/4/21rs    10 39  1200    [x]In Person  []Telephone    Class 1 - Understanding diabetes 1/11/21rs  1100  1200  x    Class 2- Nutrition and diabetes   1/20/21 JW 9:00 10:00 x    Class 3 - Preventing Complications 3/68/02IB 14:89 11:00  x    Class 4 -  In depth Nutrition and sick day care 2/3/21 rs 1000  1100  X phone call     Class 5 - 3 month follow up / goal reassessment 4/20/21rs   1010   1100 X- phone call     Gestational - RN         Gestational - RD        Individual MNT         Shared Med Appt         Yearly Follow-up        Meter Instrx 1/4/21rs        Insulin Instrx 1/4/21rs      [x]Pen  []Vial & Syringe      DSMS Support :   [x] MNT  - follow up July 2021- focus diet for diet for diabetes and kidney     [] Annual update     []

## 2021-04-22 ENCOUNTER — HOSPITAL ENCOUNTER (OUTPATIENT)
Age: 66
Setting detail: SPECIMEN
Discharge: HOME | End: 2021-04-22
Payer: COMMERCIAL

## 2021-04-22 DIAGNOSIS — N18.31 STAGE 3A CHRONIC KIDNEY DISEASE (HCC): ICD-10-CM

## 2021-04-22 LAB
ABSOLUTE EOS #: 0.07 K/UL (ref 0–0.44)
ABSOLUTE IMMATURE GRANULOCYTE: 0.03 K/UL (ref 0–0.3)
ABSOLUTE LYMPH #: 2.12 K/UL (ref 1.1–3.7)
ABSOLUTE MONO #: 0.51 K/UL (ref 0.1–1.2)
ANION GAP SERPL CALCULATED.3IONS-SCNC: 15 MMOL/L (ref 9–17)
BASOPHILS # BLD: 1 % (ref 0–2)
BASOPHILS ABSOLUTE: 0.06 K/UL (ref 0–0.2)
BUN BLDV-MCNC: 19 MG/DL (ref 8–23)
BUN/CREAT BLD: ABNORMAL (ref 9–20)
CALCIUM IONIZED: 1.36 MMOL/L (ref 1.13–1.33)
CALCIUM SERPL-MCNC: 10.6 MG/DL (ref 8.6–10.4)
CHLORIDE BLD-SCNC: 102 MMOL/L (ref 98–107)
CO2: 20 MMOL/L (ref 20–31)
CREAT SERPL-MCNC: 1.14 MG/DL (ref 0.7–1.2)
CREATININE URINE: 124.5 MG/DL (ref 39–259)
DIFFERENTIAL TYPE: ABNORMAL
EOSINOPHILS RELATIVE PERCENT: 1 % (ref 1–4)
GFR AFRICAN AMERICAN: >60 ML/MIN
GFR NON-AFRICAN AMERICAN: >60 ML/MIN
GFR SERPL CREATININE-BSD FRML MDRD: ABNORMAL ML/MIN/{1.73_M2}
GFR SERPL CREATININE-BSD FRML MDRD: ABNORMAL ML/MIN/{1.73_M2}
GLUCOSE BLD-MCNC: 173 MG/DL (ref 70–99)
HCT VFR BLD CALC: 51.5 % (ref 40.7–50.3)
HEMOGLOBIN: 17.1 G/DL (ref 13–17)
IMMATURE GRANULOCYTES: 0 %
LYMPHOCYTES # BLD: 29 % (ref 24–43)
MCH RBC QN AUTO: 30.1 PG (ref 25.2–33.5)
MCHC RBC AUTO-ENTMCNC: 33.2 G/DL (ref 28.4–34.8)
MCV RBC AUTO: 90.5 FL (ref 82.6–102.9)
MONOCYTES # BLD: 7 % (ref 3–12)
NRBC AUTOMATED: 0 PER 100 WBC
PDW BLD-RTO: 13.2 % (ref 11.8–14.4)
PHOSPHORUS: 3.2 MG/DL (ref 2.5–4.5)
PLATELET # BLD: 211 K/UL (ref 138–453)
PLATELET ESTIMATE: ABNORMAL
PMV BLD AUTO: 10.1 FL (ref 8.1–13.5)
POTASSIUM SERPL-SCNC: 4.5 MMOL/L (ref 3.7–5.3)
PTH INTACT: 18.25 PG/ML (ref 15–65)
RBC # BLD: 5.69 M/UL (ref 4.21–5.77)
RBC # BLD: ABNORMAL 10*6/UL
SEG NEUTROPHILS: 62 % (ref 36–65)
SEGMENTED NEUTROPHILS ABSOLUTE COUNT: 4.56 K/UL (ref 1.5–8.1)
SODIUM BLD-SCNC: 137 MMOL/L (ref 135–144)
TOTAL PROTEIN, URINE: 18 MG/DL
URINE TOTAL PROTEIN CREATININE RATIO: 0.14 (ref 0–0.2)
VITAMIN D 25-HYDROXY: 38.3 NG/ML (ref 30–100)
WBC # BLD: 7.4 K/UL (ref 3.5–11.3)
WBC # BLD: ABNORMAL 10*3/UL

## 2021-06-02 ENCOUNTER — HOSPITAL ENCOUNTER (OUTPATIENT)
Age: 66
Setting detail: SPECIMEN
Discharge: HOME | End: 2021-06-02
Payer: COMMERCIAL

## 2021-06-02 DIAGNOSIS — E83.52 HYPERCALCEMIA: ICD-10-CM

## 2021-06-02 DIAGNOSIS — N18.31 STAGE 3A CHRONIC KIDNEY DISEASE (HCC): ICD-10-CM

## 2021-06-02 LAB
ANION GAP SERPL CALCULATED.3IONS-SCNC: 8 MMOL/L (ref 9–17)
BUN BLDV-MCNC: 17 MG/DL (ref 8–23)
BUN/CREAT BLD: ABNORMAL (ref 9–20)
CALCIUM SERPL-MCNC: 10.2 MG/DL (ref 8.6–10.4)
CHLORIDE BLD-SCNC: 99 MMOL/L (ref 98–107)
CO2: 25 MMOL/L (ref 20–31)
CREAT SERPL-MCNC: 1.13 MG/DL (ref 0.7–1.2)
GFR AFRICAN AMERICAN: >60 ML/MIN
GFR NON-AFRICAN AMERICAN: >60 ML/MIN
GFR SERPL CREATININE-BSD FRML MDRD: ABNORMAL ML/MIN/{1.73_M2}
GFR SERPL CREATININE-BSD FRML MDRD: ABNORMAL ML/MIN/{1.73_M2}
GLUCOSE BLD-MCNC: 152 MG/DL (ref 70–99)
POTASSIUM SERPL-SCNC: 4.4 MMOL/L (ref 3.7–5.3)
SODIUM BLD-SCNC: 132 MMOL/L (ref 135–144)

## 2021-07-19 ENCOUNTER — OFFICE VISIT (OUTPATIENT)
Dept: FAMILY MEDICINE CLINIC | Age: 66
End: 2021-07-19
Payer: COMMERCIAL

## 2021-07-19 VITALS
SYSTOLIC BLOOD PRESSURE: 120 MMHG | HEIGHT: 68 IN | TEMPERATURE: 97 F | WEIGHT: 249 LBS | DIASTOLIC BLOOD PRESSURE: 76 MMHG | BODY MASS INDEX: 37.74 KG/M2

## 2021-07-19 DIAGNOSIS — K70.30 ALCOHOLIC CIRRHOSIS OF LIVER WITHOUT ASCITES (HCC): ICD-10-CM

## 2021-07-19 DIAGNOSIS — I10 ESSENTIAL HYPERTENSION: ICD-10-CM

## 2021-07-19 DIAGNOSIS — E78.2 MIXED HYPERLIPIDEMIA: ICD-10-CM

## 2021-07-19 DIAGNOSIS — Z79.4 CONTROLLED TYPE 2 DIABETES MELLITUS WITHOUT COMPLICATION, WITH LONG-TERM CURRENT USE OF INSULIN (HCC): Primary | ICD-10-CM

## 2021-07-19 DIAGNOSIS — E11.9 CONTROLLED TYPE 2 DIABETES MELLITUS WITHOUT COMPLICATION, WITH LONG-TERM CURRENT USE OF INSULIN (HCC): Primary | ICD-10-CM

## 2021-07-19 LAB — HBA1C MFR BLD: 7.1 %

## 2021-07-19 PROCEDURE — G8427 DOCREV CUR MEDS BY ELIG CLIN: HCPCS | Performed by: STUDENT IN AN ORGANIZED HEALTH CARE EDUCATION/TRAINING PROGRAM

## 2021-07-19 PROCEDURE — 3051F HG A1C>EQUAL 7.0%<8.0%: CPT | Performed by: STUDENT IN AN ORGANIZED HEALTH CARE EDUCATION/TRAINING PROGRAM

## 2021-07-19 PROCEDURE — 1036F TOBACCO NON-USER: CPT | Performed by: STUDENT IN AN ORGANIZED HEALTH CARE EDUCATION/TRAINING PROGRAM

## 2021-07-19 PROCEDURE — 3017F COLORECTAL CA SCREEN DOC REV: CPT | Performed by: STUDENT IN AN ORGANIZED HEALTH CARE EDUCATION/TRAINING PROGRAM

## 2021-07-19 PROCEDURE — G8417 CALC BMI ABV UP PARAM F/U: HCPCS | Performed by: STUDENT IN AN ORGANIZED HEALTH CARE EDUCATION/TRAINING PROGRAM

## 2021-07-19 PROCEDURE — 2022F DILAT RTA XM EVC RTNOPTHY: CPT | Performed by: STUDENT IN AN ORGANIZED HEALTH CARE EDUCATION/TRAINING PROGRAM

## 2021-07-19 PROCEDURE — 83036 HEMOGLOBIN GLYCOSYLATED A1C: CPT | Performed by: STUDENT IN AN ORGANIZED HEALTH CARE EDUCATION/TRAINING PROGRAM

## 2021-07-19 PROCEDURE — 99214 OFFICE O/P EST MOD 30 MIN: CPT | Performed by: STUDENT IN AN ORGANIZED HEALTH CARE EDUCATION/TRAINING PROGRAM

## 2021-07-19 SDOH — ECONOMIC STABILITY: FOOD INSECURITY: WITHIN THE PAST 12 MONTHS, YOU WORRIED THAT YOUR FOOD WOULD RUN OUT BEFORE YOU GOT MONEY TO BUY MORE.: NEVER TRUE

## 2021-07-19 SDOH — ECONOMIC STABILITY: TRANSPORTATION INSECURITY
IN THE PAST 12 MONTHS, HAS THE LACK OF TRANSPORTATION KEPT YOU FROM MEDICAL APPOINTMENTS OR FROM GETTING MEDICATIONS?: NO

## 2021-07-19 SDOH — ECONOMIC STABILITY: FOOD INSECURITY: WITHIN THE PAST 12 MONTHS, THE FOOD YOU BOUGHT JUST DIDN'T LAST AND YOU DIDN'T HAVE MONEY TO GET MORE.: NEVER TRUE

## 2021-07-19 SDOH — ECONOMIC STABILITY: TRANSPORTATION INSECURITY
IN THE PAST 12 MONTHS, HAS LACK OF TRANSPORTATION KEPT YOU FROM MEETINGS, WORK, OR FROM GETTING THINGS NEEDED FOR DAILY LIVING?: NO

## 2021-07-19 ASSESSMENT — ENCOUNTER SYMPTOMS
SHORTNESS OF BREATH: 0
CHEST TIGHTNESS: 0
SORE THROAT: 0
DIARRHEA: 0
NAUSEA: 0
COUGH: 0
VOMITING: 0
EYE DISCHARGE: 0
WHEEZING: 0
CONSTIPATION: 0
ABDOMINAL PAIN: 0

## 2021-07-19 ASSESSMENT — SOCIAL DETERMINANTS OF HEALTH (SDOH): HOW HARD IS IT FOR YOU TO PAY FOR THE VERY BASICS LIKE FOOD, HOUSING, MEDICAL CARE, AND HEATING?: NOT VERY HARD

## 2021-07-19 NOTE — PROGRESS NOTES
[address] PRIMARY CARE  [address] [place] [address] [address] [place] [address]  Dept: [phone]  Dept Fax: [phone]    Eduardo Luciano is a 61 y.o. male who is a Established patient, who presents today for his medical conditions/complaints as noted below:  Chief Complaint   Patient presents with    Hypertension    Diabetes         HPI:     He is here today to follow-up on diabetes and hypertension. Says that he has had several episodes of low blood sugars in the past few months, says that his blood sugars drop below 80 and then he gets sweaty and weak. He is currently on glimepiride 2 mg every morning and Lantus 15 units at bedtime. He has history of alcoholic liver cirrhosis and CKD, so he is not a good candidate for Metformin or sulfonylureas. He follows with nephrologist Dr. Ronnell Olivier and is on metoprolol 25 mg twice daily for hypertension. He is on statins for hyperlipidemia, not on ACE inhibitor. Hemoglobin A1C (%)   Date Value   07/19/2021 7.1   04/19/2021 7.3   12/16/2020 10.6 (H)             ( goal A1Cis < 7)   No results found for: LABMICR  LDL Cholesterol (mg/dL)   Date Value   12/16/2020 132 (H)   11/15/2019 110   05/07/2019 114       (goal LDL is <100)   AST (U/L)   Date Value   12/16/2020 29     ALT (U/L)   Date Value   12/16/2020 37     BUN (mg/dL)   Date Value   06/02/2021 17     BP Readings from Last 3 Encounters:   07/19/21 120/76   04/30/21 126/72   04/19/21 118/79          (goal 120/80)    Past Medical History:   Diagnosis Date    Hepatitis C     Hypertension     Liver disease     sees GI, treated for Hep C     Non-rheumatic mitral regurgitation     sees cardio,    Renal insufficiency 6/26/2018    Severe obesity (BMI 35.0-39.9) 9/11/2018    Ventricular tachycardia ([address]       History reviewed. No pertinent surgical history.     Family History   Problem Relation Age of Onset    Breast Cancer Mother     Cancer Mother     Coronary Art Dis Father         fatal MI 72       Social History     Tobacco Use    Smoking status: Former Smoker     Packs/day: 1.50     Years: 40.00     Pack years: 60.00     Start date: 1977     Quit date: 2015     Years since quittin.2    Smokeless tobacco: Never Used    Tobacco comment: quit     Substance Use Topics    Alcohol use: Yes     Alcohol/week: 7.0 standard drinks     Types: 7 Cans of beer per week     Comment: 7 beers/w; ; prev drinking 3-4 X/wk, apprx 36 y      Current Outpatient Medications   Medication Sig Dispense Refill    insulin glargine (LANTUS SOLOSTAR) 100 UNIT/ML injection pen Inject 15 Units into the skin nightly 5 pen 3    insulin lispro (HUMALOG KWIKPEN) 200 UNIT/ML SOPN pen qAC sliding scale 0-150=0 units 151-200=2 units 201-250=4 units 251-300=6 units 301-350= 8 units 351+ = 10 units max: 30 units 2 pen 3    atorvastatin (LIPITOR) 20 MG tablet Take 1 tablet by mouth daily 30 tablet 3    metoprolol tartrate (LOPRESSOR) 25 MG tablet take 1 tablet by mouth twice a day       No current facility-administered medications for this visit.      No Known Allergies    Health Maintenance   Topic Date Due    Low dose CT lung screening  Never done    Diabetic microalbuminuria test  2021    Hepatitis A vaccine (1 of 2 - Risk 2-dose series) 2020 (Originally 3/26/1956)    Shingles Vaccine (1 of 2) 2022 (Originally 3/26/2007)    Flu vaccine (1) 2021    Lipid screen  2021    Diabetic foot exam  2021    Potassium monitoring  2022    Creatinine monitoring  2022    Diabetic retinal exam  2022    A1C test (Diabetic or Prediabetic)  2022    Colon cancer screen fecal DNA test (Cologuard)  2022    Pneumococcal 0-64 years Vaccine (2 of 2 - PPSV23) 2023    DTaP/Tdap/Td vaccine (2 - Td or Tdap) 2028    COVID-19 Vaccine  Completed    HIV screen  Completed    Hib vaccine  Aged Out    Meningococcal (ACWY) vaccine  Aged Out Subjective:     Review of Systems   Constitutional: Negative for appetite change, fatigue and fever. HENT: Negative for congestion, ear pain, hearing loss and sore throat. Eyes: Negative for discharge and visual disturbance. Respiratory: Negative for cough, chest tightness, shortness of breath and wheezing. Cardiovascular: Negative for chest pain, palpitations and leg swelling. Gastrointestinal: Negative for abdominal pain, constipation, diarrhea, nausea and vomiting. Genitourinary: Negative for flank pain, frequency, hematuria and urgency. Musculoskeletal: Negative for arthralgias, gait problem, joint swelling and myalgias. Skin: Negative. Neurological: Negative for dizziness, weakness, numbness and headaches. Psychiatric/Behavioral: Negative. Negative for dysphoric mood. The patient is not nervous/anxious. Objective:     Physical Exam  Vitals reviewed. Constitutional:       Appearance: Normal appearance. He is normal weight. HENT:      Head: Normocephalic and atraumatic. Nose: Nose normal.      Mouth/Throat:      Mouth: Mucous membranes are moist.      Pharynx: Oropharynx is clear. Eyes:      Extraocular Movements: Extraocular movements intact. Conjunctiva/sclera: Conjunctivae normal.      Pupils: Pupils are equal, round, and reactive to light. Cardiovascular:      Rate and Rhythm: Normal rate and regular rhythm. Heart sounds: Normal heart sounds. No murmur heard. No gallop. Pulmonary:      Effort: Pulmonary effort is normal. No respiratory distress. Breath sounds: Normal breath sounds. No stridor. No wheezing. Abdominal:      General: Bowel sounds are normal. There is no distension. Palpations: Abdomen is soft. Tenderness: There is no abdominal tenderness. There is no guarding or rebound. Musculoskeletal:         General: No swelling or tenderness. Normal range of motion. Cervical back: Normal range of motion and neck supple. Skin:     General: Skin is warm and dry. Coloration: Skin is not jaundiced. Findings: No rash. Neurological:      General: No focal deficit present. Mental Status: He is alert and oriented to person, place, and time. Psychiatric:         Mood and Affect: Mood normal.         Behavior: Behavior normal.         Thought Content: Thought content normal.         Judgment: Judgment normal.       /76 (Site: Right Upper Arm, Position: Sitting, Cuff Size: Medium Adult)   Temp 97 °F (36.1 °C) (Temporal)   Ht 5' 8\" (1.727 m)   Wt 249 lb (112.9 kg)   BMI 37.86 kg/m²     Assessment/Plan:   1. Controlled type 2 diabetes mellitus without complication, with long-term current use of insulin (HCC)  -     POCT glycosylated hemoglobin (Hb A1C)  2. Essential hypertension  3. Mixed hyperlipidemia  -     Lipid, Fasting; Future  4. Alcoholic cirrhosis of liver without ascites (HCC)    Type 2 diabetes-POCT A1c today 7.1. Discontinued glimepiride due to episodes of hypoglycemia. Increased insulin from 15 to 20 units at bedtime. Has history of alcoholic liver cirrhosis and not a good candidate for Metformin or sulfonylureas. On statin, not on ACE inhibitor. Hypertension-controlled. On metoprolol 25 mg twice daily. Follows with nephrology for CKD. Alcoholic liver cirrhosis-last ultrasound liver done March 2019, has not been seen by GI since. Recommended following up with gastroenterology. Hyperlipidemia-on atorvastatin 20 mg daily. Lipid levels ordered. Return in about 3 months (around 10/19/2021) for Follow up DM/HTN. Orders Placed This Encounter   Procedures    Lipid, Fasting     Standing Status:   Future     Standing Expiration Date:   7/19/2022    POCT glycosylated hemoglobin (Hb A1C)     No orders of the defined types were placed in this encounter. Patient given educational materials - see patient instructions. Discussed use, benefit, and side effects of prescribed medications.

## 2021-07-27 ENCOUNTER — HOSPITAL ENCOUNTER (OUTPATIENT)
Dept: DIABETES SERVICES | Age: 66
Setting detail: THERAPIES SERIES
Discharge: HOME | End: 2021-07-27
Payer: COMMERCIAL

## 2021-07-27 DIAGNOSIS — E11.9 DIABETES MELLITUS, NEW ONSET (HCC): Primary | ICD-10-CM

## 2021-07-27 PROCEDURE — 97802 MEDICAL NUTRITION INDIV IN: CPT

## 2021-08-03 ENCOUNTER — TELEPHONE (OUTPATIENT)
Dept: FAMILY MEDICINE CLINIC | Age: 66
End: 2021-08-03

## 2021-08-03 NOTE — TELEPHONE ENCOUNTER
Called him and advised him to start taking long-acting insulin 10 units in the morning along with 20 units at bedtime. He verbalized understanding and will continue to monitor blood sugars. Advised to call back if sugars still remain elevated.

## 2021-08-03 NOTE — TELEPHONE ENCOUNTER
He is calling to report that his bs reading has been high this past week he is concerned he has had to the daytime med also and he hasnt had to do that in a while these are his numbers form the last week please advise     185  151  226  174  240  164  199  192

## 2021-08-04 NOTE — TELEPHONE ENCOUNTER
Patient called to state that he just checked his blood sugar, and it was 256. Patient is very concerned and wants to know if something can be changed again. Please advise, thank you!

## 2021-08-05 ENCOUNTER — TELEMEDICINE (OUTPATIENT)
Dept: FAMILY MEDICINE CLINIC | Age: 66
End: 2021-08-05
Payer: COMMERCIAL

## 2021-08-05 DIAGNOSIS — E11.29 CONTROLLED TYPE 2 DIABETES MELLITUS WITH MICROALBUMINURIA, WITH LONG-TERM CURRENT USE OF INSULIN (HCC): Primary | ICD-10-CM

## 2021-08-05 DIAGNOSIS — R80.9 CONTROLLED TYPE 2 DIABETES MELLITUS WITH MICROALBUMINURIA, WITH LONG-TERM CURRENT USE OF INSULIN (HCC): Primary | ICD-10-CM

## 2021-08-05 DIAGNOSIS — Z79.4 CONTROLLED TYPE 2 DIABETES MELLITUS WITH MICROALBUMINURIA, WITH LONG-TERM CURRENT USE OF INSULIN (HCC): Primary | ICD-10-CM

## 2021-08-05 PROCEDURE — 99441 PR PHYS/QHP TELEPHONE EVALUATION 5-10 MIN: CPT | Performed by: STUDENT IN AN ORGANIZED HEALTH CARE EDUCATION/TRAINING PROGRAM

## 2021-08-05 NOTE — PROGRESS NOTES
Quin Boykin is a 61 y.o. male evaluated via telephone on 8/5/2021. Consent:  He and/or health care decision maker is aware that that he may receive a bill for this telephone service, depending on his insurance coverage, and has provided verbal consent to proceed: Yes      Documentation:  I communicated with the patient and/or health care decision maker about diabetes. Details of this discussion including any medical advice provided:     He has been worried about his blood sugar since he was taken off of glimepiride. Says that his blood sugars are running a little higher. He was not sure if you could still take the long-acting insulin in the morning along with sliding scale. Says that he checks his blood sugars at 8 AM in the morning, and then at noon and then at 5 PM.  He was taken off of Metformin and glimepiride due to comorbid alcoholic liver cirrhosis. Discussed that he should take Lantus 20 units at bedtime and 10 units in the morning. He should recheck his blood sugars during daytime as he is doing and take sliding scale as needed. His last A1c was 7.1 and he is well controlled considering his comorbid conditions of liver cirrhosis and heart failure. Wen Parker was seen today for other. Diagnoses and all orders for this visit:    Controlled type 2 diabetes mellitus with microalbuminuria, with long-term current use of insulin ([facility] [address]          I affirm this is a Patient Initiated Episode with a Patient who has not had a related appointment within my department in the past 7 days or scheduled within the next 24 hours. Patient identification was verified at the start of the visit: Yes    Total Time: minutes: 5-10 minutes    The visit was conducted pursuant to the emergency declaration under the [address] [facility], [address] [place] [address] authority and the [employer] Act.   Patient identification was verified, and a caregiver was present when appropriate. The patient was located in a state where the provider was credentialed to provide care.     Note: not billable if this call serves to triage the patient into an appointment for the relevant concern      Juarez Cain MD

## 2021-08-18 DIAGNOSIS — Z79.4 CONTROLLED TYPE 2 DIABETES MELLITUS WITHOUT COMPLICATION, WITH LONG-TERM CURRENT USE OF INSULIN (HCC): ICD-10-CM

## 2021-08-18 DIAGNOSIS — E11.9 CONTROLLED TYPE 2 DIABETES MELLITUS WITHOUT COMPLICATION, WITH LONG-TERM CURRENT USE OF INSULIN (HCC): ICD-10-CM

## 2021-08-18 RX ORDER — INSULIN GLARGINE 100 [IU]/ML
INJECTION, SOLUTION SUBCUTANEOUS
Qty: 5 PEN | Refills: 3 | Status: SHIPPED | OUTPATIENT
Start: 2021-08-18 | End: 2022-03-25

## 2021-08-18 NOTE — TELEPHONE ENCOUNTER
Patient called to state that he is low on insulin as his pharmacy is unable to fill. Patient's insurance will not allow a refill until 08/22/2021, but he was recently instructed to increase due to high blood sugar readings (see 8/3/2021 encounter). Please advise, thank you!         Next Visit Date:  Future Appointments   Date Time Provider Bradley Nia   9/9/2021  9:50 AM Bhaskar Haas MD [facility] None   10/18/2021 10:00 AM Apple Garcia MD [facility]"" [address] Maintenance   Topic Date Due    Low dose CT lung screening  Never done    Diabetic microalbuminuria test  06/11/2021    Hepatitis A vaccine (1 of 2 - Risk 2-dose series) 01/18/2022 (Originally 3/26/1956)    Shingles Vaccine (1 of 2) 04/19/2022 (Originally 3/26/2007)    Flu vaccine (1) 09/01/2021    Lipid screen  12/16/2021    Diabetic foot exam  12/18/2021    Potassium monitoring  06/02/2022    Creatinine monitoring  06/02/2022    Diabetic retinal exam  06/24/2022    A1C test (Diabetic or Prediabetic)  07/19/2022    Colon cancer screen fecal DNA test (Cologuard)  12/08/2022    Pneumococcal 0-64 years Vaccine (2 of 2 - PPSV23) 12/11/2023    DTaP/Tdap/Td vaccine (2 - Td or Tdap) 07/06/2028    COVID-19 Vaccine  Completed    HIV screen  Completed    Hib vaccine  Aged Out    Meningococcal (ACWY) vaccine  Aged Out       Hemoglobin A1C (%)   Date Value   07/19/2021 7.1   04/19/2021 7.3   12/16/2020 10.6 (H)             ( goal A1C is < 7)   No results found for: LABMICR  LDL Cholesterol (mg/dL)   Date Value   12/16/2020 132 (H)   11/15/2019 110       (goal LDL is <100)   AST (U/L)   Date Value   12/16/2020 29     ALT (U/L)   Date Value   12/16/2020 37     BUN (mg/dL)   Date Value   06/02/2021 17     BP Readings from Last 3 Encounters:   07/19/21 120/76   04/30/21 126/72   04/19/21 118/79          (goal 120/80)    All Future Testing planned in [facility]  Lab Frequency Next Occurrence   CT Lung Screen (Annual) Once 08/04/2021   Lipid,

## 2021-09-01 ENCOUNTER — HOSPITAL ENCOUNTER (OUTPATIENT)
Age: 66
Setting detail: SPECIMEN
Discharge: HOME | End: 2021-09-01
Payer: COMMERCIAL

## 2021-09-01 DIAGNOSIS — N18.31 STAGE 3A CHRONIC KIDNEY DISEASE (HCC): ICD-10-CM

## 2021-09-01 LAB
ABSOLUTE EOS #: 0.08 K/UL (ref 0–0.44)
ABSOLUTE IMMATURE GRANULOCYTE: <0.03 K/UL (ref 0–0.3)
ABSOLUTE LYMPH #: 2.3 K/UL (ref 1.1–3.7)
ABSOLUTE MONO #: 0.64 K/UL (ref 0.1–1.2)
ANION GAP SERPL CALCULATED.3IONS-SCNC: 11 MMOL/L (ref 9–17)
BASOPHILS # BLD: 1 % (ref 0–2)
BASOPHILS ABSOLUTE: 0.07 K/UL (ref 0–0.2)
BUN BLDV-MCNC: 16 MG/DL (ref 8–23)
BUN/CREAT BLD: ABNORMAL (ref 9–20)
CALCIUM IONIZED: 1.35 MMOL/L (ref 1.13–1.33)
CALCIUM SERPL-MCNC: 10 MG/DL (ref 8.6–10.4)
CHLORIDE BLD-SCNC: 103 MMOL/L (ref 98–107)
CO2: 21 MMOL/L (ref 20–31)
CREAT SERPL-MCNC: 1.14 MG/DL (ref 0.7–1.2)
CREATININE URINE: 111 MG/DL (ref 39–259)
DIFFERENTIAL TYPE: ABNORMAL
EOSINOPHILS RELATIVE PERCENT: 1 % (ref 1–4)
GFR AFRICAN AMERICAN: >60 ML/MIN
GFR NON-AFRICAN AMERICAN: >60 ML/MIN
GFR SERPL CREATININE-BSD FRML MDRD: ABNORMAL ML/MIN/{1.73_M2}
GFR SERPL CREATININE-BSD FRML MDRD: ABNORMAL ML/MIN/{1.73_M2}
GLUCOSE BLD-MCNC: 194 MG/DL (ref 70–99)
HCT VFR BLD CALC: 51.1 % (ref 40.7–50.3)
HEMOGLOBIN: 16.9 G/DL (ref 13–17)
IMMATURE GRANULOCYTES: 0 %
LYMPHOCYTES # BLD: 32 % (ref 24–43)
MCH RBC QN AUTO: 29.8 PG (ref 25.2–33.5)
MCHC RBC AUTO-ENTMCNC: 33.1 G/DL (ref 28.4–34.8)
MCV RBC AUTO: 90 FL (ref 82.6–102.9)
MONOCYTES # BLD: 9 % (ref 3–12)
NRBC AUTOMATED: 0 PER 100 WBC
PDW BLD-RTO: 13.8 % (ref 11.8–14.4)
PHOSPHORUS: 3.3 MG/DL (ref 2.5–4.5)
PLATELET # BLD: 206 K/UL (ref 138–453)
PLATELET ESTIMATE: ABNORMAL
PMV BLD AUTO: 10.6 FL (ref 8.1–13.5)
POTASSIUM SERPL-SCNC: 4.2 MMOL/L (ref 3.7–5.3)
PTH INTACT: 24.19 PG/ML (ref 15–65)
RBC # BLD: 5.68 M/UL (ref 4.21–5.77)
RBC # BLD: ABNORMAL 10*6/UL
SEG NEUTROPHILS: 57 % (ref 36–65)
SEGMENTED NEUTROPHILS ABSOLUTE COUNT: 4.08 K/UL (ref 1.5–8.1)
SODIUM BLD-SCNC: 135 MMOL/L (ref 135–144)
TOTAL PROTEIN, URINE: 15 MG/DL
URINE TOTAL PROTEIN CREATININE RATIO: 0.14 (ref 0–0.2)
VITAMIN D 25-HYDROXY: 32.3 NG/ML (ref 30–100)
WBC # BLD: 7.2 K/UL (ref 3.5–11.3)
WBC # BLD: ABNORMAL 10*3/UL

## 2021-09-09 RX ORDER — PEN NEEDLE, DIABETIC 31 GX5/16"
1 NEEDLE, DISPOSABLE MISCELLANEOUS 4 TIMES DAILY
Qty: 100 EACH | Refills: 0 | Status: SHIPPED | OUTPATIENT
Start: 2021-09-09 | End: 2021-10-11

## 2021-09-09 NOTE — TELEPHONE ENCOUNTER
Received faxed medication refill request, prescription pended. Please advise, thank you!         Next Visit Date:  Future Appointments   Date Time Provider Bradley Abdullahii   10/18/2021 10:00 AM MD Dwaine Montoya [id number]   1/13/2022 11:10 AM MD ANGEL Harding Neph Teddy None       Health Maintenance   Topic Date Due    Low dose CT lung screening  Never done    Diabetic microalbuminuria test  06/11/2021    Flu vaccine (1) 09/01/2021    Hepatitis A vaccine (1 of 2 - Risk 2-dose series) 01/18/2022 (Originally 3/26/1956)    Shingles Vaccine (1 of 2) 04/19/2022 (Originally 3/26/2007)    Lipid screen  12/16/2021    Diabetic foot exam  12/18/2021    Diabetic retinal exam  06/24/2022    A1C test (Diabetic or Prediabetic)  07/19/2022    Potassium monitoring  09/01/2022    Creatinine monitoring  09/01/2022    Colon cancer screen fecal DNA test (Cologuard)  12/08/2022    Pneumococcal 0-64 years Vaccine (2 of 2 - PPSV23) 12/11/2023    DTaP/Tdap/Td vaccine (2 - Td or Tdap) 07/06/2028    COVID-19 Vaccine  Completed    HIV screen  Completed    Hib vaccine  Aged Out    Meningococcal (ACWY) vaccine  Aged Out       Hemoglobin A1C (%)   Date Value   07/19/2021 7.1   04/19/2021 7.3   12/16/2020 10.6 (H)             ( goal A1C is < 7)   No results found for: LABMICR  LDL Cholesterol (mg/dL)   Date Value   12/16/2020 132 (H)   11/15/2019 110       (goal LDL is <100)   AST (U/L)   Date Value   12/16/2020 29     ALT (U/L)   Date Value   12/16/2020 37     BUN (mg/dL)   Date Value   09/01/2021 16     BP Readings from Last 3 Encounters:   09/09/21 118/80   07/19/21 120/76   04/30/21 126/72          (goal 120/80)    All Future Testing planned in [facility]  Lab Frequency Next Occurrence   CT Lung Screen (Annual) Once 08/04/2021   Lipid, Fasting Once 85/01/5968   Basic Metabolic Panel Every 16 weeks    CBC Auto Differential Every 16 weeks    Creatinine, Random Urine Every 16 weeks    Phosphorus Every 16 weeks    Protein / creatinine ratio, urine Every 16 weeks    Protein, urine, random Every 16 weeks    PTH, INTACT WITH IONIZED CALCIUM Every 16 weeks    Vitamin D 25 Hydroxy Every 16 weeks    Basic Metabolic Panel Every 16 weeks    CBC Auto Differential Every 16 weeks    Creatinine, Random Urine Every 16 weeks    Phosphorus Every 16 weeks    Protein / creatinine ratio, urine Every 16 weeks    Protein, urine, random Every 16 weeks    PTH, INTACT WITH IONIZED CALCIUM Every 16 weeks    Vitamin D 25 Hydroxy Every 16 weeks    CBC Auto Differential Every 16 weeks    Basic Metabolic Panel Every 16 weeks    Creatinine, Random Urine Every 16 weeks    Phosphorus Every 16 weeks    Protein / creatinine ratio, urine Every 16 weeks    Protein, urine, random Every 16 weeks    PTH, INTACT WITH IONIZED CALCIUM Every 16 weeks    Vitamin D 25 Hydroxy Every 16 weeks                Patient Active Problem List:     Elevated liver enzymes     Family history of coronary arteriosclerosis     History of alcohol abuse     History of tobacco abuse     Non-rheumatic mitral regurgitation     Renal insufficiency     Biliary cirrhosis (HCC)     Diastolic dysfunction     Low HDL (under 40)     Prediabetes     Pure hypercholesterolemia     Mild concentric left ventricular hypertrophy (LVH)     Hepatitis C antibody test positive     Chronic diastolic heart failure (HCC)     Essential hypertension     Severe obesity with body mass index (BMI) of 35.0 to 39.9 with serious comorbidity (HCC)     History of hepatitis C     Controlled type 2 diabetes mellitus without complication, with long-term current use of insulin (HCC)     CKD stage 3 due to type 2 diabetes mellitus (HCC)     Mixed hyperlipidemia     Alcoholic cirrhosis of liver without ascites ([facility] [address]

## 2021-10-18 ENCOUNTER — OFFICE VISIT (OUTPATIENT)
Dept: FAMILY MEDICINE CLINIC | Age: 66
End: 2021-10-18
Payer: COMMERCIAL

## 2021-10-18 VITALS
BODY MASS INDEX: 38.19 KG/M2 | SYSTOLIC BLOOD PRESSURE: 131 MMHG | DIASTOLIC BLOOD PRESSURE: 82 MMHG | HEIGHT: 68 IN | HEART RATE: 82 BPM | TEMPERATURE: 97 F | WEIGHT: 252 LBS

## 2021-10-18 DIAGNOSIS — Z79.4 CONTROLLED TYPE 2 DIABETES MELLITUS WITHOUT COMPLICATION, WITH LONG-TERM CURRENT USE OF INSULIN (HCC): Primary | ICD-10-CM

## 2021-10-18 DIAGNOSIS — Z00.00 ROUTINE ADULT HEALTH MAINTENANCE: ICD-10-CM

## 2021-10-18 DIAGNOSIS — E78.2 MIXED HYPERLIPIDEMIA: ICD-10-CM

## 2021-10-18 DIAGNOSIS — Z23 NEED FOR INFLUENZA VACCINATION: ICD-10-CM

## 2021-10-18 DIAGNOSIS — E11.9 CONTROLLED TYPE 2 DIABETES MELLITUS WITHOUT COMPLICATION, WITH LONG-TERM CURRENT USE OF INSULIN (HCC): Primary | ICD-10-CM

## 2021-10-18 LAB — HBA1C MFR BLD: 7.9 %

## 2021-10-18 PROCEDURE — 3017F COLORECTAL CA SCREEN DOC REV: CPT | Performed by: STUDENT IN AN ORGANIZED HEALTH CARE EDUCATION/TRAINING PROGRAM

## 2021-10-18 PROCEDURE — 90674 CCIIV4 VAC NO PRSV 0.5 ML IM: CPT | Performed by: STUDENT IN AN ORGANIZED HEALTH CARE EDUCATION/TRAINING PROGRAM

## 2021-10-18 PROCEDURE — 83036 HEMOGLOBIN GLYCOSYLATED A1C: CPT | Performed by: STUDENT IN AN ORGANIZED HEALTH CARE EDUCATION/TRAINING PROGRAM

## 2021-10-18 PROCEDURE — 3051F HG A1C>EQUAL 7.0%<8.0%: CPT | Performed by: STUDENT IN AN ORGANIZED HEALTH CARE EDUCATION/TRAINING PROGRAM

## 2021-10-18 PROCEDURE — 1036F TOBACCO NON-USER: CPT | Performed by: STUDENT IN AN ORGANIZED HEALTH CARE EDUCATION/TRAINING PROGRAM

## 2021-10-18 PROCEDURE — 99214 OFFICE O/P EST MOD 30 MIN: CPT | Performed by: STUDENT IN AN ORGANIZED HEALTH CARE EDUCATION/TRAINING PROGRAM

## 2021-10-18 PROCEDURE — 90471 IMMUNIZATION ADMIN: CPT | Performed by: STUDENT IN AN ORGANIZED HEALTH CARE EDUCATION/TRAINING PROGRAM

## 2021-10-18 PROCEDURE — 2022F DILAT RTA XM EVC RTNOPTHY: CPT | Performed by: STUDENT IN AN ORGANIZED HEALTH CARE EDUCATION/TRAINING PROGRAM

## 2021-10-18 PROCEDURE — G8482 FLU IMMUNIZE ORDER/ADMIN: HCPCS | Performed by: STUDENT IN AN ORGANIZED HEALTH CARE EDUCATION/TRAINING PROGRAM

## 2021-10-18 PROCEDURE — G8417 CALC BMI ABV UP PARAM F/U: HCPCS | Performed by: STUDENT IN AN ORGANIZED HEALTH CARE EDUCATION/TRAINING PROGRAM

## 2021-10-18 PROCEDURE — G8427 DOCREV CUR MEDS BY ELIG CLIN: HCPCS | Performed by: STUDENT IN AN ORGANIZED HEALTH CARE EDUCATION/TRAINING PROGRAM

## 2021-10-18 ASSESSMENT — ENCOUNTER SYMPTOMS
WHEEZING: 0
EYE DISCHARGE: 0
VOMITING: 0
CONSTIPATION: 0
NAUSEA: 0
SORE THROAT: 0
CHEST TIGHTNESS: 0
SHORTNESS OF BREATH: 0
DIARRHEA: 0
COUGH: 0
ABDOMINAL PAIN: 0

## 2021-10-18 NOTE — PROGRESS NOTES
[address] PRIMARY CARE  [address] [place] [address] [address] [facility]  Dept: [phone]  Dept Fax: [phone]    Jasen Swan is a 61 y.o. male who is a Established patient, who presents today for his medical conditions/complaints as noted below:  Chief Complaint   Patient presents with    Diabetes    Hypertension         HPI:     He is here today to follow-up on diabetes. Says that his sugars have been higher since he stopped taking the glimepiride. Says that this morning his sugar was 230. He has been taking the Lantus 20 units at bedtime and 10 units in the morning. He does admit to eating late night milk and cereal, says that he tries to eat a low sugar cereal.  Also drinks low sugar juices. He does follow with diabetic education. He has chronic kidney disease and is following with nephrology. He also follows with cardiology for ventricular tachycardia and hypertension and is currently on metoprolol. He has been taking atorvastatin 20 mg daily for hyperlipidemia, fenofibrate was stopped due to kidney issues. Hemoglobin A1C (%)   Date Value   10/18/2021 7.9   07/19/2021 7.1   04/19/2021 7.3             ( goal A1Cis < 7)   No results found for: LABMICR  LDL Cholesterol (mg/dL)   Date Value   12/16/2020 132 (H)   11/15/2019 110   05/07/2019 114       (goal LDL is <100)   AST (U/L)   Date Value   12/16/2020 29     ALT (U/L)   Date Value   12/16/2020 37     BUN (mg/dL)   Date Value   09/01/2021 16     BP Readings from Last 3 Encounters:   10/18/21 131/82   09/09/21 118/80   07/19/21 120/76          (goal 120/80)    Past Medical History:   Diagnosis Date    Hepatitis C     Hypertension     Liver disease     sees GI, treated for Hep C     Non-rheumatic mitral regurgitation     sees cardio,    Renal insufficiency 6/26/2018    Severe obesity (BMI 35.0-39.9) 9/11/2018    Ventricular tachycardia ([address]       History reviewed. No pertinent surgical history.     Family History   Problem Relation Age of Onset    Breast Cancer Mother     Cancer Mother     Coronary Art Dis Father         fatal MI 72       Social History     Tobacco Use    Smoking status: Former Smoker     Packs/day: 1.50     Years: 40.00     Pack years: 60.00     Start date: 1977     Quit date: 2015     Years since quittin.5    Smokeless tobacco: Never Used    Tobacco comment: quit     Substance Use Topics    Alcohol use: Yes     Alcohol/week: 7.0 standard drinks     Types: 7 Cans of beer per week     Comment: 7 beers/w; ; prev drinking 3-4 X/wk, apprx 36 y      Current Outpatient Medications   Medication Sig Dispense Refill    dapagliflozin (FARXIGA) 10 MG tablet Take 1 tablet by mouth every morning 30 tablet 5    B-D UF III MINI PEN NEEDLES 31G X 5 MM MISC use 1 PEN NEEDLE to inject MEDICATION subcutaneously four times a day with HUMALOG AND LANTUS 100 each 3    insulin glargine (LANTUS SOLOSTAR) 100 UNIT/ML injection pen Take 20 units at bedtime and 10 units in the morning 5 pen 3    atorvastatin (LIPITOR) 20 MG tablet take 1 tablet by mouth once daily 30 tablet 3    insulin lispro (HUMALOG KWIKPEN) 200 UNIT/ML SOPN pen qAC sliding scale 0-150=0 units 151-200=2 units 201-250=4 units 251-300=6 units 301-350= 8 units 351+ = 10 units max: 30 units 2 pen 3    metoprolol tartrate (LOPRESSOR) 25 MG tablet take 1 tablet by mouth twice a day       No current facility-administered medications for this visit.      No Known Allergies    Health Maintenance   Topic Date Due    Low dose CT lung screening  Never done    Diabetic microalbuminuria test  2021    Flu vaccine (1) 2021    Hepatitis A vaccine (1 of 2 - Risk 2-dose series) 2022 (Originally 3/26/1956)    Shingles Vaccine (1 of 2) 2022 (Originally 3/26/2007)    Lipid screen  2021    Diabetic foot exam  2021    Diabetic retinal exam  2022    Potassium monitoring  2022    Creatinine monitoring  09/01/2022    A1C test (Diabetic or Prediabetic)  10/18/2022    Colon cancer screen fecal DNA test (Cologuard)  12/08/2022    Pneumococcal 0-64 years Vaccine (2 of 2 - PPSV23) 12/11/2023    DTaP/Tdap/Td vaccine (2 - Td or Tdap) 07/06/2028    COVID-19 Vaccine  Completed    HIV screen  Completed    Hib vaccine  Aged Out    Meningococcal (ACWY) vaccine  Aged Out       Subjective:     Review of Systems   Constitutional: Negative for appetite change, fatigue and fever. HENT: Negative for congestion, ear pain, hearing loss and sore throat. Eyes: Negative for discharge and visual disturbance. Respiratory: Negative for cough, chest tightness, shortness of breath and wheezing. Cardiovascular: Negative for chest pain, palpitations and leg swelling. Gastrointestinal: Negative for abdominal pain, constipation, diarrhea, nausea and vomiting. Genitourinary: Negative for flank pain, frequency, hematuria and urgency. Musculoskeletal: Negative for arthralgias, gait problem, joint swelling and myalgias. Skin: Negative. Neurological: Negative for dizziness, weakness, numbness and headaches. Psychiatric/Behavioral: Negative. Negative for dysphoric mood. The patient is not nervous/anxious. Objective:     Physical Exam  Vitals reviewed. Constitutional:       Appearance: Normal appearance. He is normal weight. HENT:      Head: Normocephalic and atraumatic. Nose: Nose normal.      Mouth/Throat:      Mouth: Mucous membranes are moist.      Pharynx: Oropharynx is clear. Eyes:      Extraocular Movements: Extraocular movements intact. Conjunctiva/sclera: Conjunctivae normal.      Pupils: Pupils are equal, round, and reactive to light. Cardiovascular:      Rate and Rhythm: Normal rate and regular rhythm. Heart sounds: Normal heart sounds. No murmur heard. No gallop. Pulmonary:      Effort: Pulmonary effort is normal. No respiratory distress.       Breath sounds: Normal breath sounds. No stridor. No wheezing. Abdominal:      General: Bowel sounds are normal. There is no distension. Palpations: Abdomen is soft. Tenderness: There is no abdominal tenderness. There is no guarding or rebound. Musculoskeletal:         General: No swelling or tenderness. Normal range of motion. Cervical back: Normal range of motion and neck supple. Skin:     General: Skin is warm and dry. Coloration: Skin is not jaundiced. Findings: No rash. Neurological:      General: No focal deficit present. Mental Status: He is alert and oriented to person, place, and time. Psychiatric:         Mood and Affect: Mood normal.         Behavior: Behavior normal.         Thought Content: Thought content normal.         Judgment: Judgment normal.       /82 (Site: Left Lower Arm, Position: Sitting, Cuff Size: Medium Adult)   Pulse 82   Temp 97 °F (36.1 °C) (Temporal)   Ht 5' 8\" (1.727 m)   Wt 252 lb (114.3 kg)   BMI 38.32 kg/m²     Assessment/Plan:   1. Controlled type 2 diabetes mellitus without complication, with long-term current use of insulin (HCC)  -     POCT glycosylated hemoglobin (Hb A1C)  -     POCT microalbumin  -     dapagliflozin (FARXIGA) 10 MG tablet; Take 1 tablet by mouth every morning, Disp-30 tablet, R-5Normal  -     Microalbumin, Ur; Future  2. Mixed hyperlipidemia  -     Lipid, Fasting; Future  3. Routine adult health maintenance  -     PSA screening; Future  4. Need for influenza vaccination  -     INFLUENZA, MDCK QUADV, 2 YRS AND OLDER, IM, PF, PREFILL SYR OR SDV, 0.5ML (FLUCELVAX QUADV, PF)     Type 2 diabetes-POCT A1c today 7.9. Started on Farxiga 10 mg daily. Continue Lantus 20 units at bedtime and 10 units in the morning along with insulin sliding scale. On statins but not on ACE inhibitor. Hyperlipidemia-on atorvastatin 20 mg daily. Fenofibrate discontinued due to CKD. We will recheck lipid levels.     Return in about 3 months (around 1/18/2022) for Follow up DM/HTN. Orders Placed This Encounter   Procedures    INFLUENZA, MDCK QUADV, 2 YRS AND OLDER, IM, PF, PREFILL SYR OR SDV, 0.5ML (FLUCELVAX QUADV, PF)    Lipid, Fasting     Standing Status:   Future     Standing Expiration Date:   10/18/2022    PSA screening     Standing Status:   Future     Standing Expiration Date:   1/18/2022    Microalbumin, Ur     Standing Status:   Future     Standing Expiration Date:   1/18/2022    POCT glycosylated hemoglobin (Hb A1C)    POCT microalbumin     Orders Placed This Encounter   Medications    dapagliflozin (FARXIGA) 10 MG tablet     Sig: Take 1 tablet by mouth every morning     Dispense:  30 tablet     Refill:  5       Patient given educational materials - see patient instructions. Discussed use, benefit, and side effects of prescribed medications. All patientquestions answered. Pt voiced understanding. Reviewed health maintenance. Instructedto continue current medications, diet and exercise. Patient agreed with treatmentplan. Follow up as directed.      Electronically signed by Julisa Thomas MD on 10/18/2021 at 10:32 AM

## 2021-10-18 NOTE — PROGRESS NOTES
Vaccine Information Sheet, \"Influenza - Inactivated\"  given to Alexandra Thapa, or parent/legal guardian of  Alexandra Thapa and verbalized understanding. Patient responses:    Have you ever had a reaction to a flu vaccine? No  Are you able to eat eggs without adverse effects? Yes  Do you have any current illness? No  Have you ever had [place] Syndrome? No    Flu vaccine given per order. Please see immunization tab.

## 2021-12-08 DIAGNOSIS — Z79.4 CONTROLLED TYPE 2 DIABETES MELLITUS WITHOUT COMPLICATION, WITH LONG-TERM CURRENT USE OF INSULIN (HCC): ICD-10-CM

## 2021-12-08 DIAGNOSIS — E11.9 CONTROLLED TYPE 2 DIABETES MELLITUS WITHOUT COMPLICATION, WITH LONG-TERM CURRENT USE OF INSULIN (HCC): ICD-10-CM

## 2021-12-08 RX ORDER — INSULIN LISPRO 200 [IU]/ML
INJECTION, SOLUTION SUBCUTANEOUS
Qty: 6 ML | Refills: 1 | Status: SHIPPED | OUTPATIENT
Start: 2021-12-08 | End: 2022-07-19

## 2021-12-08 NOTE — TELEPHONE ENCOUNTER
Payal Barton is calling to request a refill on the following medication(s):    Medication Request:  Requested Prescriptions     Pending Prescriptions Disp Refills    insulin lispro (HUMALOG KWIKPEN) 200 UNIT/ML SOPN pen [Pharmacy Med Name: HUMALOG 200 UNIT/ML KWIKPEN] 6 mL      Sig: INJECT BEFORE EVERY MEAL PER SLIDING SCALE; 0  - 150 = 0 UNITS, 151 - 200 = 2 UNITS, 201 - 250 = 4 UNITS, 251 - 300 = 6 UNITS, 301 - 350 = 8 UNITS, 351 + = 10 UNITS (MAXIMUM OF 30 UNITS PER DAY).        Last Visit Date (If Applicable):  70/80/3329    Next Visit Date:    1/18/2022

## 2022-01-06 ENCOUNTER — HOSPITAL ENCOUNTER (OUTPATIENT)
Age: 67
Setting detail: SPECIMEN
Discharge: HOME | End: 2022-01-06

## 2022-01-06 DIAGNOSIS — E11.9 CONTROLLED TYPE 2 DIABETES MELLITUS WITHOUT COMPLICATION, WITH LONG-TERM CURRENT USE OF INSULIN (HCC): ICD-10-CM

## 2022-01-06 DIAGNOSIS — Z00.00 ROUTINE ADULT HEALTH MAINTENANCE: ICD-10-CM

## 2022-01-06 DIAGNOSIS — N18.31 STAGE 3A CHRONIC KIDNEY DISEASE (HCC): ICD-10-CM

## 2022-01-06 DIAGNOSIS — E78.2 MIXED HYPERLIPIDEMIA: ICD-10-CM

## 2022-01-06 DIAGNOSIS — Z79.4 CONTROLLED TYPE 2 DIABETES MELLITUS WITHOUT COMPLICATION, WITH LONG-TERM CURRENT USE OF INSULIN (HCC): ICD-10-CM

## 2022-01-06 LAB
ABSOLUTE EOS #: 0.09 K/UL (ref 0–0.44)
ABSOLUTE IMMATURE GRANULOCYTE: 0.04 K/UL (ref 0–0.3)
ABSOLUTE LYMPH #: 2.42 K/UL (ref 1.1–3.7)
ABSOLUTE MONO #: 0.62 K/UL (ref 0.1–1.2)
ANION GAP SERPL CALCULATED.3IONS-SCNC: 13 MMOL/L (ref 9–17)
BASOPHILS # BLD: 1 % (ref 0–2)
BASOPHILS ABSOLUTE: 0.06 K/UL (ref 0–0.2)
BUN BLDV-MCNC: 20 MG/DL (ref 8–23)
BUN/CREAT BLD: ABNORMAL (ref 9–20)
CALCIUM IONIZED: 1.32 MMOL/L (ref 1.13–1.33)
CALCIUM SERPL-MCNC: 10.5 MG/DL (ref 8.6–10.4)
CHLORIDE BLD-SCNC: 104 MMOL/L (ref 98–107)
CHOLESTEROL, FASTING: 152 MG/DL
CHOLESTEROL/HDL RATIO: 5.4
CO2: 21 MMOL/L (ref 20–31)
CREAT SERPL-MCNC: 1.18 MG/DL (ref 0.7–1.2)
CREATININE URINE: 80.1 MG/DL (ref 39–259)
CREATININE URINE: 81.6 MG/DL (ref 39–259)
DIFFERENTIAL TYPE: ABNORMAL
EOSINOPHILS RELATIVE PERCENT: 1 % (ref 1–4)
GFR AFRICAN AMERICAN: >60 ML/MIN
GFR NON-AFRICAN AMERICAN: >60 ML/MIN
GFR SERPL CREATININE-BSD FRML MDRD: ABNORMAL ML/MIN/{1.73_M2}
GFR SERPL CREATININE-BSD FRML MDRD: ABNORMAL ML/MIN/{1.73_M2}
GLUCOSE BLD-MCNC: 136 MG/DL (ref 70–99)
HCT VFR BLD CALC: 55 % (ref 40.7–50.3)
HDLC SERPL-MCNC: 28 MG/DL
HEMOGLOBIN: 17.8 G/DL (ref 13–17)
IMMATURE GRANULOCYTES: 1 %
LDL CHOLESTEROL: 71 MG/DL (ref 0–130)
LYMPHOCYTES # BLD: 36 % (ref 24–43)
MCH RBC QN AUTO: 29.6 PG (ref 25.2–33.5)
MCHC RBC AUTO-ENTMCNC: 32.4 G/DL (ref 28.4–34.8)
MCV RBC AUTO: 91.5 FL (ref 82.6–102.9)
MICROALBUMIN/CREAT 24H UR: 27 MG/L
MICROALBUMIN/CREAT UR-RTO: 34 MCG/MG CREAT
MONOCYTES # BLD: 9 % (ref 3–12)
NRBC AUTOMATED: 0 PER 100 WBC
PDW BLD-RTO: 13.6 % (ref 11.8–14.4)
PHOSPHORUS: 3.1 MG/DL (ref 2.5–4.5)
PLATELET # BLD: 216 K/UL (ref 138–453)
PLATELET ESTIMATE: ABNORMAL
PMV BLD AUTO: 10.4 FL (ref 8.1–13.5)
POTASSIUM SERPL-SCNC: 4.4 MMOL/L (ref 3.7–5.3)
PROSTATE SPECIFIC ANTIGEN: 0.23 UG/L
PTH INTACT: 27 PG/ML (ref 15–65)
RBC # BLD: 6.01 M/UL (ref 4.21–5.77)
RBC # BLD: ABNORMAL 10*6/UL
SEG NEUTROPHILS: 52 % (ref 36–65)
SEGMENTED NEUTROPHILS ABSOLUTE COUNT: 3.51 K/UL (ref 1.5–8.1)
SODIUM BLD-SCNC: 138 MMOL/L (ref 135–144)
TOTAL PROTEIN, URINE: 11 MG/DL
TRIGLYCERIDE, FASTING: 263 MG/DL
URINE TOTAL PROTEIN CREATININE RATIO: 0.13 (ref 0–0.2)
VITAMIN D 25-HYDROXY: 36.3 NG/ML (ref 30–100)
VLDLC SERPL CALC-MCNC: ABNORMAL MG/DL (ref 1–30)
WBC # BLD: 6.7 K/UL (ref 3.5–11.3)
WBC # BLD: ABNORMAL 10*3/UL

## 2022-01-11 ENCOUNTER — TELEPHONE (OUTPATIENT)
Dept: FAMILY MEDICINE CLINIC | Age: 67
End: 2022-01-11

## 2022-01-11 DIAGNOSIS — E11.9 CONTROLLED TYPE 2 DIABETES MELLITUS WITHOUT COMPLICATION, WITH LONG-TERM CURRENT USE OF INSULIN (HCC): Primary | ICD-10-CM

## 2022-01-11 DIAGNOSIS — Z79.4 CONTROLLED TYPE 2 DIABETES MELLITUS WITHOUT COMPLICATION, WITH LONG-TERM CURRENT USE OF INSULIN (HCC): Primary | ICD-10-CM

## 2022-01-11 NOTE — TELEPHONE ENCOUNTER
I am not able to review the covered alternatives in the scanned form due to unclear picture. Do we have the original form?

## 2022-01-17 RX ORDER — BLOOD-GLUCOSE METER
KIT MISCELLANEOUS
COMMUNITY
Start: 2021-12-05 | End: 2022-01-25

## 2022-01-17 RX ORDER — LANCETS 28 GAUGE
EACH MISCELLANEOUS
COMMUNITY
Start: 2021-12-28 | End: 2022-02-22

## 2022-01-17 RX ORDER — PEN NEEDLE, DIABETIC 32 GX 1/6"
NEEDLE, DISPOSABLE MISCELLANEOUS
COMMUNITY
Start: 2021-12-28 | End: 2022-03-25

## 2022-01-18 ENCOUNTER — OFFICE VISIT (OUTPATIENT)
Dept: FAMILY MEDICINE CLINIC | Age: 67
End: 2022-01-18
Payer: COMMERCIAL

## 2022-01-18 VITALS
TEMPERATURE: 97.3 F | HEART RATE: 88 BPM | BODY MASS INDEX: 37.89 KG/M2 | SYSTOLIC BLOOD PRESSURE: 127 MMHG | HEIGHT: 68 IN | WEIGHT: 250 LBS | OXYGEN SATURATION: 94 % | DIASTOLIC BLOOD PRESSURE: 82 MMHG

## 2022-01-18 DIAGNOSIS — Z79.4 TYPE 2 DIABETES MELLITUS WITHOUT COMPLICATION, WITH LONG-TERM CURRENT USE OF INSULIN (HCC): Primary | ICD-10-CM

## 2022-01-18 DIAGNOSIS — Z87.891 PERSONAL HISTORY OF TOBACCO USE: ICD-10-CM

## 2022-01-18 DIAGNOSIS — E11.9 TYPE 2 DIABETES MELLITUS WITHOUT COMPLICATION, WITH LONG-TERM CURRENT USE OF INSULIN (HCC): Primary | ICD-10-CM

## 2022-01-18 DIAGNOSIS — D75.1 POLYCYTHEMIA: ICD-10-CM

## 2022-01-18 DIAGNOSIS — I10 ESSENTIAL HYPERTENSION: ICD-10-CM

## 2022-01-18 PROBLEM — E83.52 HYPERCALCEMIA: Status: ACTIVE | Noted: 2022-01-18

## 2022-01-18 LAB — HBA1C MFR BLD: 7.3 %

## 2022-01-18 PROCEDURE — G8482 FLU IMMUNIZE ORDER/ADMIN: HCPCS | Performed by: STUDENT IN AN ORGANIZED HEALTH CARE EDUCATION/TRAINING PROGRAM

## 2022-01-18 PROCEDURE — 83036 HEMOGLOBIN GLYCOSYLATED A1C: CPT | Performed by: STUDENT IN AN ORGANIZED HEALTH CARE EDUCATION/TRAINING PROGRAM

## 2022-01-18 PROCEDURE — 1036F TOBACCO NON-USER: CPT | Performed by: STUDENT IN AN ORGANIZED HEALTH CARE EDUCATION/TRAINING PROGRAM

## 2022-01-18 PROCEDURE — G8427 DOCREV CUR MEDS BY ELIG CLIN: HCPCS | Performed by: STUDENT IN AN ORGANIZED HEALTH CARE EDUCATION/TRAINING PROGRAM

## 2022-01-18 PROCEDURE — 3051F HG A1C>EQUAL 7.0%<8.0%: CPT | Performed by: STUDENT IN AN ORGANIZED HEALTH CARE EDUCATION/TRAINING PROGRAM

## 2022-01-18 PROCEDURE — 2022F DILAT RTA XM EVC RTNOPTHY: CPT | Performed by: STUDENT IN AN ORGANIZED HEALTH CARE EDUCATION/TRAINING PROGRAM

## 2022-01-18 PROCEDURE — G0296 VISIT TO DETERM LDCT ELIG: HCPCS | Performed by: STUDENT IN AN ORGANIZED HEALTH CARE EDUCATION/TRAINING PROGRAM

## 2022-01-18 PROCEDURE — G8417 CALC BMI ABV UP PARAM F/U: HCPCS | Performed by: STUDENT IN AN ORGANIZED HEALTH CARE EDUCATION/TRAINING PROGRAM

## 2022-01-18 PROCEDURE — 99214 OFFICE O/P EST MOD 30 MIN: CPT | Performed by: STUDENT IN AN ORGANIZED HEALTH CARE EDUCATION/TRAINING PROGRAM

## 2022-01-18 PROCEDURE — 3017F COLORECTAL CA SCREEN DOC REV: CPT | Performed by: STUDENT IN AN ORGANIZED HEALTH CARE EDUCATION/TRAINING PROGRAM

## 2022-01-18 ASSESSMENT — ENCOUNTER SYMPTOMS
CONSTIPATION: 0
SORE THROAT: 0
WHEEZING: 0
VOMITING: 0
CHEST TIGHTNESS: 0
COUGH: 0
DIARRHEA: 0
ABDOMINAL PAIN: 0
EYE DISCHARGE: 0
NAUSEA: 0
SHORTNESS OF BREATH: 0

## 2022-01-18 NOTE — PROGRESS NOTES
[address] PRIMARY CARE  [address] [place] [address]  Dept: [phone]  Dept Fax: [phone]    Iva Purdy is a 61 y.o. male who is a Established patient, who presents today for his medical conditions/complaints as noted below:  Chief Complaint   Patient presents with    Diabetes    Hypertension         HPI:     He is here today to follow-up on diabetes and hypertension. He says that he is doing well on the current medications and has noticed that his blood sugars have been slightly lower since starting on Farxiga. He has not needed much of sliding scale insulin for correction. He follows with nephrology due to history of CKD and is not on any ACE inhibitor. His previous labs also show persistent polycythemia with elevated hematocrit and hemoglobin. He says he is quit smoking several years ago, no other risk factors identified for polycythemia. Hemoglobin A1C (%)   Date Value   01/18/2022 7.3   10/18/2021 7.9   07/19/2021 7.1             ( goal A1Cis < 7)   Microalb/Crt. Ratio (mcg/mg creat)   Date Value   01/06/2022 34 (H)     LDL Cholesterol (mg/dL)   Date Value   01/06/2022 71   12/16/2020 132 (H)   11/15/2019 110       (goal LDL is <100)   AST (U/L)   Date Value   12/16/2020 29     ALT (U/L)   Date Value   12/16/2020 37     BUN (mg/dL)   Date Value   01/06/2022 20     BP Readings from Last 3 Encounters:   01/18/22 127/82   01/13/22 124/80   10/18/21 131/82          (goal 120/80)    Past Medical History:   Diagnosis Date    Hepatitis C     Hypertension     Liver disease     sees GI, treated for Hep C     Non-rheumatic mitral regurgitation     sees cardio,    Renal insufficiency 6/26/2018    Severe obesity (BMI 35.0-39.9) 9/11/2018    Ventricular tachycardia ([address]       History reviewed. No pertinent surgical history.     Family History   Problem Relation Age of Onset    Breast Cancer Mother     Cancer Mother     Coronary Art Dis Father Depression Screen  01/18/2022    Hepatitis A vaccine (1 of 2 - Risk 2-dose series) 01/18/2022 (Originally 3/26/1956)    Shingles Vaccine (1 of 2) 04/19/2022 (Originally 3/26/2007)    Diabetic retinal exam  06/24/2022    Colon cancer screen fecal DNA test (Cologuard)  12/08/2022    Diabetic microalbuminuria test  01/06/2023    Lipid screen  01/06/2023    Potassium monitoring  01/06/2023    Creatinine monitoring  01/06/2023    Diabetic foot exam  01/18/2023    A1C test (Diabetic or Prediabetic)  01/18/2023    Pneumococcal 0-64 years Vaccine (2 of 2 - PPSV23) 12/11/2023    DTaP/Tdap/Td vaccine (2 - Td or Tdap) 07/06/2028    Flu vaccine  Completed    HIV screen  Completed    Hib vaccine  Aged Out    Meningococcal (ACWY) vaccine  Aged Out       Subjective:     Review of Systems   Constitutional: Negative for appetite change, fatigue and fever. HENT: Negative for congestion, ear pain, hearing loss and sore throat. Eyes: Negative for discharge and visual disturbance. Respiratory: Negative for cough, chest tightness, shortness of breath and wheezing. Cardiovascular: Negative for chest pain, palpitations and leg swelling. Gastrointestinal: Negative for abdominal pain, constipation, diarrhea, nausea and vomiting. Genitourinary: Negative for flank pain, frequency, hematuria and urgency. Musculoskeletal: Negative for arthralgias, gait problem, joint swelling and myalgias. Skin: Negative. Neurological: Negative for dizziness, weakness, numbness and headaches. Psychiatric/Behavioral: Negative. Negative for dysphoric mood. The patient is not nervous/anxious. Objective:     Physical Exam  Vitals reviewed. Constitutional:       Appearance: Normal appearance. He is obese. HENT:      Head: Normocephalic and atraumatic. Nose: Nose normal.      Mouth/Throat:      Mouth: Mucous membranes are moist.      Pharynx: Oropharynx is clear.    Eyes:      Extraocular Movements: Extraocular movements intact. Conjunctiva/sclera: Conjunctivae normal.      Pupils: Pupils are equal, round, and reactive to light. Cardiovascular:      Rate and Rhythm: Normal rate and regular rhythm. Pulses:           Dorsalis pedis pulses are 2+ on the right side and 2+ on the left side. Heart sounds: Normal heart sounds. No murmur heard. No gallop. Pulmonary:      Effort: Pulmonary effort is normal. No respiratory distress. Breath sounds: Normal breath sounds. No stridor. No wheezing. Abdominal:      General: Bowel sounds are normal. There is no distension. Palpations: Abdomen is soft. Tenderness: There is no abdominal tenderness. There is no guarding or rebound. Musculoskeletal:         General: No swelling or tenderness. Normal range of motion. Cervical back: Normal range of motion and neck supple. Right foot: Normal range of motion. No deformity. Left foot: Normal range of motion. No deformity. Feet:      Right foot:      Protective Sensation: 10 sites tested. 10 sites sensed. Skin integrity: Skin integrity normal.      Toenail Condition: Right toenails are normal.      Left foot:      Protective Sensation: 10 sites tested. 10 sites sensed. Skin integrity: Skin integrity normal.      Toenail Condition: Left toenails are normal.   Skin:     General: Skin is warm and dry. Coloration: Skin is not jaundiced. Findings: No rash. Neurological:      General: No focal deficit present. Mental Status: He is alert and oriented to person, place, and time. Psychiatric:         Mood and Affect: Mood normal.         Behavior: Behavior normal.         Thought Content: Thought content normal.         Judgment: Judgment normal.       /82   Pulse 88   Temp 97.3 °F (36.3 °C)   Ht 5' 8\" (1.727 m)   Wt 250 lb (113.4 kg)   SpO2 94%   BMI 38.01 kg/m²     Assessment/Plan:   1.  Type 2 diabetes mellitus without complication, with long-term current use of insulin (HCC)  -      DIABETES FOOT EXAM  -     dapagliflozin (FARXIGA) 10 MG tablet; Take 1 tablet by mouth every morning, Disp-90 tablet, R-1Normal  -     POCT glycosylated hemoglobin (Hb A1C)  2. Essential hypertension  -     metoprolol tartrate (LOPRESSOR) 25 MG tablet; Take 1 tablet by mouth 2 times daily, Disp-180 tablet, R-3Normal  3. Odilia Chrales MD, Hematology/Oncology, [place]  4. Personal history of tobacco use  -     FL VISIT TO DISCUSS LUNG CA SCREEN W LDCT  -     CT Lung Screen (Annual); Future    Type 2 diabetes-POCT A1c today 7.3. Continue Lantus 20 units at bedtime and 10 units in the morning. Continue Farxiga 10 mg daily. On on statin but not on ACE inhibitor. Hypertension-controlled. On metoprolol tartrate 25 mg twice daily. Follows with nephrology due to history of CKD. Polycythemia-persistently elevated hemoglobin and hematocrit on past several labs, has quit smoking several years ago. Referral placed for hematology. Return in about 3 months (around 2022) for Follow up DM/HTN. Orders Placed This Encounter   Procedures    CT Lung Screen (Annual)     Age: Patient is 61 y.o. Smoking History: Social History    Tobacco Use      Smoking status: Former Smoker        Packs/day: 1.50        Years: 40.00        Pack years: 61        Start date: 1977        Quit date: 2015        Years since quittin.7      Smokeless tobacco: Never Used      Tobacco comment: quit      Alcohol use: Yes      Alcohol/week: 7.0 standard drinks      Types: 7 Cans of beer per week      Comment: 7 beers/w; ; prev drinking 3-4 X/wk, apprx 36 y    Drug use: No   Pack years: 61    Date of last lung cancer screening: No previous lung cancer screening exam     Standing Status:   Future     Standing Expiration Date:   2023     Order Specific Question:   Is there documentation of shared decision making?      Answer:   Yes     Order Specific Question:   Is this a low dose CT or a routine CT? Answer:   Low Dose CT [1]     Order Specific Question:   Is this the first (baseline) CT or an annual exam?     Answer: Annual [2]     Order Specific Question:   Does the patient show any signs or symptoms of lung cancer? Answer:   No     Order Specific Question:   Smoking Status? Answer: Former Smoker [4]     Order Specific Question:   Date quit smoking? (must be within 15 years)     Answer:   4/20/2015     Order Specific Question:   Smoking packs per day? Answer:   1.5     Order Specific Question:   Years smoking? Answer:   [address] Lisa Mace MD, Hematology/Oncology, [place]     Referral Priority:   Routine     Referral Type:   Eval and Treat     Referral Reason:   Specialty Services Required     Referred to Provider:   Eufemia Redding MD     Requested Specialty:   Oncology     Number of Visits Requested:   1    POCT glycosylated hemoglobin (Hb A1C)    HM DIABETES FOOT EXAM    [facility] VISIT TO DISCUSS LUNG CA SCREEN W LDCT     Orders Placed This Encounter   Medications    dapagliflozin (FARXIGA) 10 MG tablet     Sig: Take 1 tablet by mouth every morning     Dispense:  90 tablet     Refill:  1    metoprolol tartrate (LOPRESSOR) 25 MG tablet     Sig: Take 1 tablet by mouth 2 times daily     Dispense:  180 tablet     Refill:  3       Patient given educational materials - see patient instructions. Discussed use, benefit, and side effects of prescribed medications. All patientquestions answered. Pt voiced understanding. Reviewed health maintenance. Instructedto continue current medications, diet and exercise. Patient agreed with treatmentplan. Follow up as directed.      Electronically signed by Berto Wiggins MD on 1/18/2022 at 9:06 PM  Low Dose CT (LDCT) Lung Screening criteria met:     Age 55-77(Medicare) or 50-80 (USPSTF)   Pack year smoking >30 (Medicare) or >20 (USPSTF)   Still smoking or less than 15 year since quit   No sign or symptoms of lung cancer   > 11 months since last LDCT     Risks and benefits of lung cancer screening with LDCT scans discussed:    Significance of positive screen - False-positive LDCT results often occur. 95% of all positive results do not lead to a diagnosis of cancer. Usually further imaging can resolve most false-positive results; however, some patients may require invasive procedures. Over diagnosis risk - 10% to 12% of screen-detected lung cancer cases are over diagnosed--that is, the cancer would not have been detected in the patient's lifetime without the screening. Need for follow up screens annually to continue lung cancer screening effectiveness     Risks associated with radiation from annual LDCT- Radiation exposure is about the same as for a mammogram, which is about 1/3 of the annual background radiation exposure from everyday life. Starting screening at age 54 is not likely to increase cancer risk from radiation exposure. Patients with comorbidities resulting in life expectancy of < 10 years, or that would preclude treatment of an abnormality identified on CT, should not be screened due to lack of benefit.     To obtain maximal benefit from this screening, smoking cessation and long-term abstinence from smoking is critical

## 2022-01-20 ENCOUNTER — TELEPHONE (OUTPATIENT)
Dept: FAMILY MEDICINE CLINIC | Age: 67
End: 2022-01-20

## 2022-01-25 RX ORDER — BLOOD-GLUCOSE METER
KIT MISCELLANEOUS
Qty: 100 STRIP | Refills: 2 | Status: SHIPPED | OUTPATIENT
Start: 2022-01-25 | End: 2022-05-05 | Stop reason: SDUPTHER

## 2022-01-25 NOTE — TELEPHONE ENCOUNTER
Requested Prescriptions     Pending Prescriptions Disp Refills    FREESTYLE LITE strip [Pharmacy Med Name: FREESTYLE LITE TEST STRIP] 100 strip      Sig: USE 1 TEST STRIP TO TEST BLOOD SUGAR 3 TIMES DAILY AS NEEDED FOR SYMPTOMS OF IRREGULAR BLOOD GLUCOSE.

## 2022-01-27 ENCOUNTER — TELEPHONE (OUTPATIENT)
Dept: FAMILY MEDICINE CLINIC | Age: 67
End: 2022-01-27

## 2022-01-27 NOTE — TELEPHONE ENCOUNTER
Patient new insurance will not cover farxiga . He stated that he can only take certain things due to health issues . His insurance will change next month and will cover this medication. Can you give him a few samples ?

## 2022-01-28 ENCOUNTER — INITIAL CONSULT (OUTPATIENT)
Dept: ONCOLOGY | Age: 67
End: 2022-01-28
Payer: COMMERCIAL

## 2022-01-28 ENCOUNTER — TELEPHONE (OUTPATIENT)
Dept: ONCOLOGY | Age: 67
End: 2022-01-28

## 2022-01-28 VITALS
HEART RATE: 78 BPM | DIASTOLIC BLOOD PRESSURE: 81 MMHG | SYSTOLIC BLOOD PRESSURE: 127 MMHG | HEIGHT: 68 IN | TEMPERATURE: 96.8 F | BODY MASS INDEX: 38.74 KG/M2 | WEIGHT: 255.6 LBS

## 2022-01-28 DIAGNOSIS — D75.1 POLYCYTHEMIA: Primary | ICD-10-CM

## 2022-01-28 PROCEDURE — G8427 DOCREV CUR MEDS BY ELIG CLIN: HCPCS | Performed by: INTERNAL MEDICINE

## 2022-01-28 PROCEDURE — G8417 CALC BMI ABV UP PARAM F/U: HCPCS | Performed by: INTERNAL MEDICINE

## 2022-01-28 PROCEDURE — 99244 OFF/OP CNSLTJ NEW/EST MOD 40: CPT | Performed by: INTERNAL MEDICINE

## 2022-01-28 PROCEDURE — G8482 FLU IMMUNIZE ORDER/ADMIN: HCPCS | Performed by: INTERNAL MEDICINE

## 2022-01-28 NOTE — PROGRESS NOTES
_               Mr. Andreia Gudino is a very pleasant 61 y.o. male with history of multiple co morbidities as listed. The patient is referred for evaluation and management of polycythemia. The patient was noted to have elevation of red blood cells over the last few lab tests. Patient denies any headaches or dizziness. No TIA or CVA-like symptoms. No chest pain or anginal symptoms. The patient had history of mild chronic renal insufficiency. He has history of diabetes and liver disease. He had history of hepatitis C on treatment. He is overweight. The patient managed to quit smoking 7 years ago. Used to smoke 1 pack/day for about 20 years. Denies alcohol drinking. Josue Fernandez PAST MEDICAL HISTORY: has a past medical history of Hepatitis C, Hypertension, Liver disease, Non-rheumatic mitral regurgitation, Renal insufficiency, Severe obesity (BMI 35.0-39.9), and Ventricular tachycardia ([address] PAST SURGICAL HISTORY: has no past surgical history on file. CURRENT MEDICATIONS:  has a current medication list which includes the following prescription(s): freestyle lite, dapagliflozin, metoprolol tartrate, freestyle lancets, novofine plus pen needle, humalog kwikpen, atorvastatin, b-d uf iii mini pen needles, and lantus solostar. ALLERGIES:  has No Known Allergies. FAMILY HISTORY: Mother had breast cancer. Otherwise negative for any hematological or oncological conditions. SOCIAL HISTORY:  reports that he quit smoking about 6 years ago. He started smoking about 44 years ago. He has a 60.00 pack-year smoking history. He has never used smokeless tobacco. He reports current alcohol use of about 7.0 standard drinks of alcohol per week. He reports that he does not use drugs. REVIEW OF SYSTEMS:     · General: No weakness or fatigue. No unanticipated weight loss or decreased appetite. No fever or chills.    · Eyes: No lesions  Neck - supple, no significant adenopathy  Lymphatics - no palpable lymphadenopathy, no hepatosplenomegaly  Chest - clear to auscultation, no wheezes, rales or rhonchi, symmetric air entry  Heart - normal rate, regular rhythm, normal S1, S2, no murmurs, rubs, clicks or gallops  Abdomen - soft, nontender, nondistended, no masses or organomegaly  Neurological - alert, oriented, normal speech, no focal findings or movement disorder noted  Musculoskeletal - no joint tenderness, deformity or swelling  Extremities - peripheral pulses normal, no pedal edema, no clubbing or cyanosis  Skin - normal coloration and turgor, no rashes, no suspicious skin lesions noted     Review of Diagnostic data:   Lab Results   Component Value Date    WBC 6.7 01/06/2022    HGB 17.8 (H) 01/06/2022    HCT 55.0 (H) 01/06/2022    MCV 91.5 01/06/2022     01/06/2022       Chemistry        Component Value Date/Time     01/06/2022 0800    K 4.4 01/06/2022 0800     01/06/2022 0800    CO2 21 01/06/2022 0800    BUN 20 01/06/2022 0800    CREATININE 1.18 01/06/2022 0800        Component Value Date/Time    CALCIUM 10.5 (H) 01/06/2022 0800    ALKPHOS 88 12/16/2020 0827    AST 29 12/16/2020 0827    ALT 37 12/16/2020 0827    BILITOT 0.67 12/16/2020 0827            IMPRESSION:   Polycythemia, likely reactive polycythemia  History of tobacco abuse. Quit 7 years ago  Overweight  Probable sleep apnea  Hepatitis C  Mild chronic renal insufficiency    PLAN: I reviewed the labs available to me and discussed with the patient. For more than 60 minutes of face to face discussion, I explained to the patient the nature of this hematologic problem. I explained the significance of these abnormalities in layman language. Reviewing the patient's labs over the last several years, patient is having progressive increase in the red blood cells but it is not symptomatic and patient is not having any complications related to that problem.   I believe this is reactive resulting in secondary to patient's history of smoking as well as probably due to underlying sleep apnea. I recommended evaluation and testing for sleep apnea but patient declines. He already managed to quit smoking. I recommended maintaining good oxygenation and to try to control his weight. That may help controlling the probable sleep apnea. I think PCV is quite unlikely but I will check JAK2 gene mutation and erythropoietin level to rule out that possibility. I will also check carboxyhemoglobin level. I will contact him with the results and further recommendations. We will consider therapeutic phlebotomy if hematocrit increase above 55. Patient's questions were answered to the best of his satisfaction and he verbalized full understanding and agreement.

## 2022-01-28 NOTE — TELEPHONE ENCOUNTER
AVS from 1/28/22     Labs soon  Virtual visit or Call with results and further recommendations    Pt will [facility]    Will follow results to be shown to md for further recommendations    Pt was given AVS and appt schedule

## 2022-01-31 ENCOUNTER — HOSPITAL ENCOUNTER (OUTPATIENT)
Age: 67
Setting detail: SPECIMEN
Discharge: HOME | End: 2022-01-31

## 2022-01-31 DIAGNOSIS — D75.1 POLYCYTHEMIA: ICD-10-CM

## 2022-01-31 LAB
ABSOLUTE EOS #: 0.03 K/UL (ref 0–0.44)
ABSOLUTE IMMATURE GRANULOCYTE: <0.03 K/UL (ref 0–0.3)
ABSOLUTE LYMPH #: 2.2 K/UL (ref 1.1–3.7)
ABSOLUTE MONO #: 0.52 K/UL (ref 0.1–1.2)
BASOPHILS # BLD: 1 % (ref 0–2)
BASOPHILS ABSOLUTE: 0.05 K/UL (ref 0–0.2)
CARBOXYHEMOGLOBIN: 1.4 % (ref 0–5)
DIFFERENTIAL TYPE: ABNORMAL
EOSINOPHILS RELATIVE PERCENT: 0 % (ref 1–4)
HCT VFR BLD CALC: 55.7 % (ref 40.7–50.3)
HEMOGLOBIN: 17.8 G/DL (ref 13–17)
IMMATURE GRANULOCYTES: 0 %
LYMPHOCYTES # BLD: 28 % (ref 24–43)
MCH RBC QN AUTO: 29.5 PG (ref 25.2–33.5)
MCHC RBC AUTO-ENTMCNC: 32 G/DL (ref 28.4–34.8)
MCV RBC AUTO: 92.2 FL (ref 82.6–102.9)
MONOCYTES # BLD: 7 % (ref 3–12)
NRBC AUTOMATED: 0 PER 100 WBC
PDW BLD-RTO: 13.8 % (ref 11.8–14.4)
PLATELET # BLD: 210 K/UL (ref 138–453)
PLATELET ESTIMATE: ABNORMAL
PMV BLD AUTO: 10.4 FL (ref 8.1–13.5)
RBC # BLD: 6.04 M/UL (ref 4.21–5.77)
RBC # BLD: ABNORMAL 10*6/UL
SEG NEUTROPHILS: 64 % (ref 36–65)
SEGMENTED NEUTROPHILS ABSOLUTE COUNT: 4.92 K/UL (ref 1.5–8.1)
WBC # BLD: 7.7 K/UL (ref 3.5–11.3)
WBC # BLD: ABNORMAL 10*3/UL

## 2022-02-01 ENCOUNTER — TELEPHONE (OUTPATIENT)
Dept: ONCOLOGY | Age: 67
End: 2022-02-01

## 2022-02-01 NOTE — LETTER
2/1/2022        [address] [facility]    Dear Dahlia Memos:    Your healthcare provider has ordered a low dose CT scan of the chest for lung cancer screening. You will find enclosed, information about CT lung screening. Please review the statement of understanding, you will be asked to sign a copy of this at the time of your CT scan    If you have not already been contacted to make the appointment for your scan, please call our scheduling department at [phone]    Keep in mind that CT lung screening does not take the place of smoking cessation. If you are a current smoker, you will find enclosed smoking cessation resources. Please do not hesitate to contact me if you have any questions or concerns.     [address] [place] [address],      [facility] Scre[facility]  [phone]

## 2022-02-02 LAB — ERYTHROPOIETIN: 9 MU/ML (ref 4–27)

## 2022-02-07 LAB
JAK2 QNT, SOURCE: NORMAL
V617 MUTATION, PERCENT: 0 %
V617F MUTATION, QNT: NOT DETECTED

## 2022-02-08 ENCOUNTER — HOSPITAL ENCOUNTER (OUTPATIENT)
Dept: CT IMAGING | Age: 67
Discharge: HOME | End: 2022-02-10
Payer: COMMERCIAL

## 2022-02-08 ENCOUNTER — TELEPHONE (OUTPATIENT)
Dept: INFUSION THERAPY | Age: 67
End: 2022-02-08

## 2022-02-08 DIAGNOSIS — D75.1 POLYCYTHEMIA: Primary | ICD-10-CM

## 2022-02-08 DIAGNOSIS — Z87.891 PERSONAL HISTORY OF TOBACCO USE: ICD-10-CM

## 2022-02-08 PROCEDURE — 71271 CT THORAX LUNG CANCER SCR C-: CPT

## 2022-02-08 NOTE — TELEPHONE ENCOUNTER
Per Dr Sal Giles, pt notified RBC's elevated d/t lack of O2 secondary to sleep apnea. No further testing at this time. F/u in 6 mo with cbc prior. Pt transferred to scheduling.

## 2022-02-09 DIAGNOSIS — K80.20 GALLSTONES: Primary | ICD-10-CM

## 2022-02-11 ENCOUNTER — HOSPITAL ENCOUNTER (OUTPATIENT)
Dept: ULTRASOUND IMAGING | Facility: CLINIC | Age: 67
Discharge: HOME | End: 2022-02-13
Payer: COMMERCIAL

## 2022-02-11 DIAGNOSIS — K80.20 GALLSTONES: ICD-10-CM

## 2022-02-11 PROCEDURE — 76705 ECHO EXAM OF ABDOMEN: CPT

## 2022-02-16 ENCOUNTER — TELEPHONE (OUTPATIENT)
Dept: FAMILY MEDICINE CLINIC | Age: 67
End: 2022-02-16

## 2022-02-17 NOTE — TELEPHONE ENCOUNTER
Patients insurance did not cover his current insulin was not covered so you switched him to  basaglar

## 2022-02-17 NOTE — TELEPHONE ENCOUNTER
He was previously getting both [place] and insulin. Has there been any change from his insurance? Is there any other insulin that would be covered?

## 2022-02-22 RX ORDER — LANCETS 28 GAUGE
EACH MISCELLANEOUS
Qty: 100 EACH | Refills: 1 | Status: SHIPPED | OUTPATIENT
Start: 2022-02-22 | End: 2022-05-12 | Stop reason: SDUPTHER

## 2022-02-22 NOTE — TELEPHONE ENCOUNTER
Олег Newman is calling to request a refill on the following medication(s):    Medication Request:  Requested Prescriptions     Pending Prescriptions Disp Refills    FreeStyle Lancets [address] [facility] [Pharmacy Med Name: FREESTYLE 28G LANCETS]       Sig: use 1 LANCET to TEST BLOOD SUGAR three times a day       Last Visit Date (If Applicable):  6/55/7320    Next Visit Date:    4/18/2022

## 2022-03-25 DIAGNOSIS — Z79.4 CONTROLLED TYPE 2 DIABETES MELLITUS WITHOUT COMPLICATION, WITH LONG-TERM CURRENT USE OF INSULIN (HCC): ICD-10-CM

## 2022-03-25 DIAGNOSIS — E11.9 CONTROLLED TYPE 2 DIABETES MELLITUS WITHOUT COMPLICATION, WITH LONG-TERM CURRENT USE OF INSULIN (HCC): ICD-10-CM

## 2022-03-25 PROCEDURE — 3051F HG A1C>EQUAL 7.0%<8.0%: CPT | Performed by: STUDENT IN AN ORGANIZED HEALTH CARE EDUCATION/TRAINING PROGRAM

## 2022-03-25 RX ORDER — INSULIN GLARGINE 100 [IU]/ML
INJECTION, SOLUTION SUBCUTANEOUS
Qty: 15 ML | Refills: 3 | Status: SHIPPED | OUTPATIENT
Start: 2022-03-25 | End: 2022-08-18

## 2022-03-25 RX ORDER — PEN NEEDLE, DIABETIC 32 GX 1/6"
NEEDLE, DISPOSABLE MISCELLANEOUS
Qty: 100 EACH | Refills: 3 | Status: SHIPPED | OUTPATIENT
Start: 2022-03-25 | End: 2022-09-27

## 2022-03-25 NOTE — TELEPHONE ENCOUNTER
Mechelle Brown is calling to request a refill on the following medication(s):    Medication Request:  Requested Prescriptions     Pending Prescriptions Disp Refills    NOVOFINE PLUS PEN NEEDLE 32G X 4 MM MISC [Pharmacy Med Name: [place] PEN NDL 32GX1/6\"] 100 each 3     Sig: use 1 PEN NEEDLE to inject MEDICATION subcutaneously four times a day with HUMALOG AND LANTUS    insulin glargine (BASAGLAR KWIKPEN) 100 UNIT/ML injection pen [Pharmacy Med Name: BASAGLAR 100 UNIT/ML KWIKPEN] 15 mL 3     Sig: INJECT 20 UNITS SUBCUTANEOUSLY AT BEDTIME AND INJECT 10 UNITS SUBCUTANEOUSLY IN THE MORNING.        Last Visit Date (If Applicable):  5/10/4729    Next Visit Date:    4/18/2022

## 2022-04-18 ENCOUNTER — OFFICE VISIT (OUTPATIENT)
Dept: FAMILY MEDICINE CLINIC | Age: 67
End: 2022-04-18

## 2022-04-18 VITALS
HEART RATE: 59 BPM | DIASTOLIC BLOOD PRESSURE: 81 MMHG | BODY MASS INDEX: 38.04 KG/M2 | HEIGHT: 68 IN | OXYGEN SATURATION: 95 % | WEIGHT: 251 LBS | SYSTOLIC BLOOD PRESSURE: 128 MMHG

## 2022-04-18 DIAGNOSIS — I10 ESSENTIAL HYPERTENSION: ICD-10-CM

## 2022-04-18 DIAGNOSIS — Z79.4 TYPE 2 DIABETES MELLITUS WITHOUT COMPLICATION, WITH LONG-TERM CURRENT USE OF INSULIN (HCC): Primary | ICD-10-CM

## 2022-04-18 DIAGNOSIS — E11.9 TYPE 2 DIABETES MELLITUS WITHOUT COMPLICATION, WITH LONG-TERM CURRENT USE OF INSULIN (HCC): Primary | ICD-10-CM

## 2022-04-18 DIAGNOSIS — R19.5 POSITIVE COLORECTAL CANCER SCREENING USING COLOGUARD TEST: ICD-10-CM

## 2022-04-18 LAB — HBA1C MFR BLD: 6.7 %

## 2022-04-18 PROCEDURE — 99214 OFFICE O/P EST MOD 30 MIN: CPT | Performed by: STUDENT IN AN ORGANIZED HEALTH CARE EDUCATION/TRAINING PROGRAM

## 2022-04-18 PROCEDURE — 3044F HG A1C LEVEL LT 7.0%: CPT | Performed by: STUDENT IN AN ORGANIZED HEALTH CARE EDUCATION/TRAINING PROGRAM

## 2022-04-18 PROCEDURE — 83036 HEMOGLOBIN GLYCOSYLATED A1C: CPT | Performed by: STUDENT IN AN ORGANIZED HEALTH CARE EDUCATION/TRAINING PROGRAM

## 2022-04-18 ASSESSMENT — ENCOUNTER SYMPTOMS
EYE DISCHARGE: 0
WHEEZING: 0
SHORTNESS OF BREATH: 0
VOMITING: 0
NAUSEA: 0
DIARRHEA: 0
CHEST TIGHTNESS: 0
ABDOMINAL PAIN: 0
CONSTIPATION: 0
SORE THROAT: 0
COUGH: 0

## 2022-04-18 ASSESSMENT — PATIENT HEALTH QUESTIONNAIRE - PHQ9
SUM OF ALL RESPONSES TO PHQ QUESTIONS 1-9: 0
SUM OF ALL RESPONSES TO PHQ9 QUESTIONS 1 & 2: 0
SUM OF ALL RESPONSES TO PHQ QUESTIONS 1-9: 0
1. LITTLE INTEREST OR PLEASURE IN DOING THINGS: 0
SUM OF ALL RESPONSES TO PHQ QUESTIONS 1-9: 0
SUM OF ALL RESPONSES TO PHQ QUESTIONS 1-9: 0
2. FEELING DOWN, DEPRESSED OR HOPELESS: 0

## 2022-04-18 NOTE — PROGRESS NOTES
s  [address] PRIMARY CARE  [address] [place] [address] [facility]  Dept: [phone]  Dept Fax: [phone]    Eduardo Luciano is a 74 y.o. male who is a Established patient, who presents today for his medical conditions/complaints as noted below:  Chief Complaint   Patient presents with    Hypertension    Diabetes         HPI:     He is here today to follow-up on diabetes and hypertension. He says that he recently discontinued his prescription insurance plan and is now trying to get on Medicare part C prescription coverage in few weeks. He was not able to get [facility] from the pharmacy once his original discount coupon ran out and is requesting samples from office. He says that his blood sugars have improved significantly. He says that he has been getting generic insulin needles from the pharmacy and does have been causing a lot of skin issues for him, sometimes the needles bend while injecting and cause significant pain. He says that eventually he would like to get off of insulin and stay on pills if possible. He was found to have a positive Cologuard test but he declined to get follow-up colonoscopy. He was also advised to get sleep study by his oncologist for elevated hemoglobin and hematocrit but he says that he does not want to do the CPAP treatment even if found positive so he will not go for the study. Hemoglobin A1C (%)   Date Value   04/18/2022 6.7   01/18/2022 7.3   10/18/2021 7.9             ( goal A1Cis < 7)   Microalb/Crt.  Ratio (mcg/mg creat)   Date Value   01/06/2022 34 (H)     LDL Cholesterol (mg/dL)   Date Value   01/06/2022 71   12/16/2020 132 (H)   11/15/2019 110       (goal LDL is <100)   AST (U/L)   Date Value   12/16/2020 29     ALT (U/L)   Date Value   12/16/2020 37     BUN (mg/dL)   Date Value   01/06/2022 20     BP Readings from Last 3 Encounters:   04/18/22 128/81   01/28/22 127/81   01/18/22 127/82          (goal 120/80)    Past Medical History:   Diagnosis Date    Hepatitis C     Hypertension     Liver disease     sees GI, treated for Hep C     Non-rheumatic mitral regurgitation     sees cardio,    Renal insufficiency 2018    Severe obesity (BMI 35.0-39.9) 2018    Ventricular tachycardia ([address]       History reviewed. No pertinent surgical history. Family History   Problem Relation Age of Onset    Breast Cancer Mother     Cancer Mother     Coronary Art Dis Father         fatal MI 72       Social History     Tobacco Use    Smoking status: Former Smoker     Packs/day: 1.50     Years: 40.00     Pack years: 60.00     Start date: 1977     Quit date: 2015     Years since quittin.0    Smokeless tobacco: Never Used    Tobacco comment: quit     Substance Use Topics    Alcohol use: Yes     Alcohol/week: 7.0 standard drinks     Types: 7 Cans of beer per week     Comment: 7 beers/w; ; prev drinking 3-4 X/wk, apprx 40 y      Current Outpatient Medications   Medication Sig Dispense Refill    NOVOFINE PLUS PEN NEEDLE 32G X 4 MM MISC use 1 PEN NEEDLE to inject MEDICATION subcutaneously four times a day with HUMALOG AND LANTUS 100 each 3    insulin glargine (BASAGLAR KWIKPEN) 100 UNIT/ML injection pen INJECT 20 UNITS SUBCUTANEOUSLY AT BEDTIME AND INJECT 10 UNITS SUBCUTANEOUSLY IN THE MORNING.  15 mL 3    FreeStyle Lancets MISC use 1 LANCET to TEST BLOOD SUGAR three times a day 100 each 1    FREESTYLE LITE strip USE 1 TEST STRIP TO TEST BLOOD SUGAR 3 TIMES DAILY AS NEEDED FOR SYMPTOMS OF IRREGULAR BLOOD GLUCOSE. 100 strip 2    dapagliflozin (FARXIGA) 10 MG tablet Take 1 tablet by mouth every morning 90 tablet 1    metoprolol tartrate (LOPRESSOR) 25 MG tablet Take 1 tablet by mouth 2 times daily 180 tablet 3    insulin lispro (HUMALOG KWIKPEN) 200 UNIT/ML SOPN pen INJECT BEFORE EVERY MEAL PER SLIDING SCALE; 0  - 150 = 0 UNITS, 151 - 200 = 2 UNITS, 201 - 250 = 4 UNITS, 251 - 300 = 6 UNITS, 301 - 350 = 8 UNITS, 351 + = 10 UNITS (MAXIMUM OF 30 UNITS PER DAY). 6 mL 1    atorvastatin (LIPITOR) 20 MG tablet take 1 tablet by mouth once daily 90 tablet 1    B-D UF III MINI PEN NEEDLES 31G X 5 MM MISC use 1 PEN NEEDLE to inject MEDICATION subcutaneously four times a day with HUMALOG AND LANTUS 100 each 3     No current facility-administered medications for this visit. No Known Allergies    Health Maintenance   Topic Date Due    AAA screen  03/26/2022    Shingles Vaccine (1 of 2) 04/19/2022 (Originally 3/26/2007)    Hepatitis A vaccine (1 of 2 - Risk 2-dose series) 10/18/2022 (Originally 3/26/1956)    Pneumococcal 65+ years Vaccine (2 - PCV) 04/18/2023 (Originally 12/11/2019)    Diabetic retinal exam  06/24/2022    Colorectal Cancer Screen  12/08/2022    Diabetic microalbuminuria test  01/06/2023    Lipid screen  01/06/2023    Potassium monitoring  01/06/2023    Creatinine monitoring  01/06/2023    Diabetic foot exam  01/18/2023    Low dose CT lung screening  02/08/2023    A1C test (Diabetic or Prediabetic)  04/18/2023    Depression Screen  04/18/2023    DTaP/Tdap/Td vaccine (2 - Td or Tdap) 07/06/2028    Flu vaccine  Completed    COVID-19 Vaccine  Completed    HIV screen  Completed    Hib vaccine  Aged Out    Meningococcal (ACWY) vaccine  Aged Out       Subjective:     Review of Systems   Constitutional: Negative for appetite change, fatigue and fever. HENT: Negative for congestion, ear pain, hearing loss and sore throat. Eyes: Negative for discharge and visual disturbance. Respiratory: Negative for cough, chest tightness, shortness of breath and wheezing. Cardiovascular: Negative for chest pain, palpitations and leg swelling. Gastrointestinal: Negative for abdominal pain, constipation, diarrhea, nausea and vomiting. Genitourinary: Negative for flank pain, frequency, hematuria and urgency. Musculoskeletal: Negative for arthralgias, gait problem, joint swelling and myalgias. Skin: Negative. Neurological: Negative for dizziness, weakness, numbness and headaches. Psychiatric/Behavioral: Negative. Negative for dysphoric mood. The patient is not nervous/anxious. Objective:     Physical Exam  Vitals reviewed. Constitutional:       Appearance: Normal appearance. He is obese. HENT:      Head: Normocephalic and atraumatic. Nose: Nose normal.      Mouth/Throat:      Mouth: Mucous membranes are moist.      Pharynx: Oropharynx is clear. Eyes:      Extraocular Movements: Extraocular movements intact. Conjunctiva/sclera: Conjunctivae normal.      Pupils: Pupils are equal, round, and reactive to light. Cardiovascular:      Rate and Rhythm: Normal rate and regular rhythm. Heart sounds: Normal heart sounds. No murmur heard. No gallop. Pulmonary:      Effort: Pulmonary effort is normal. No respiratory distress. Breath sounds: Normal breath sounds. No stridor. No wheezing. Abdominal:      General: Bowel sounds are normal. There is no distension. Palpations: Abdomen is soft. Tenderness: There is no abdominal tenderness. There is no guarding or rebound. Musculoskeletal:         General: No swelling or tenderness. Normal range of motion. Cervical back: Normal range of motion and neck supple. Skin:     General: Skin is warm and dry. Coloration: Skin is not jaundiced. Findings: No rash. Neurological:      General: No focal deficit present. Mental Status: He is alert and oriented to person, place, and time. Psychiatric:         Mood and Affect: Mood normal.         Behavior: Behavior normal.         Thought Content: Thought content normal.         Judgment: Judgment normal.       /81 (Site: Left Upper Arm, Position: Sitting, Cuff Size: Medium Adult)   Pulse 59   Ht 5' 8\" (1.727 m)   Wt 251 lb (113.9 kg)   SpO2 95%   BMI 38.16 kg/m²     Assessment/Plan:   1.  Type 2 diabetes mellitus without complication, with long-term current use of insulin (HCC)  -     POCT glycosylated hemoglobin (Hb A1C)  2. Essential hypertension  3. Positive colorectal cancer screening using Cologuard test    Type 2 diabetes-POCT A1c today 6.7, provided with samples for Farxiga 10 mg daily. On Basaglar 20 units in the morning and 10 units at bedtime along with sliding scale. Hypertension-controlled, continue metoprolol tartrate 25 mg twice daily    Positive Cologuard-refused to get follow-up colonoscopy    Elevated hemoglobin and hematocrit-followed by oncologist.  Ernesto Benton to get sleep study. Return in about 3 months (around 7/18/2022) for Follow up DM/HTN. Orders Placed This Encounter   Procedures    POCT glycosylated hemoglobin (Hb A1C)     No orders of the defined types were placed in this encounter. Patient given educational materials - see patient instructions. Discussed use, benefit, and side effects of prescribed medications. All patientquestions answered. Pt voiced understanding. Reviewed health maintenance. Instructedto continue current medications, diet and exercise. Patient agreed with treatmentplan. Follow up as directed.      Electronically signed by Chuy Portillo MD on 4/18/2022 at 2:52 PM

## 2022-04-27 ENCOUNTER — HOSPITAL ENCOUNTER (OUTPATIENT)
Age: 67
Setting detail: SPECIMEN
Discharge: HOME | End: 2022-04-27

## 2022-04-27 DIAGNOSIS — N18.31 STAGE 3A CHRONIC KIDNEY DISEASE (HCC): ICD-10-CM

## 2022-04-27 DIAGNOSIS — E83.52 HYPERCALCEMIA: ICD-10-CM

## 2022-04-27 LAB
ABSOLUTE EOS #: 0.06 K/UL (ref 0–0.44)
ABSOLUTE IMMATURE GRANULOCYTE: 0.04 K/UL (ref 0–0.3)
ABSOLUTE LYMPH #: 2.23 K/UL (ref 1.1–3.7)
ABSOLUTE MONO #: 0.61 K/UL (ref 0.1–1.2)
ANION GAP SERPL CALCULATED.3IONS-SCNC: 13 MMOL/L (ref 9–17)
BASOPHILS # BLD: 1 % (ref 0–2)
BASOPHILS ABSOLUTE: 0.06 K/UL (ref 0–0.2)
BUN BLDV-MCNC: 19 MG/DL (ref 8–23)
CALCIUM IONIZED: 1.37 MMOL/L (ref 1.13–1.33)
CALCIUM SERPL-MCNC: 10.3 MG/DL (ref 8.6–10.4)
CHLORIDE BLD-SCNC: 101 MMOL/L (ref 98–107)
CO2: 22 MMOL/L (ref 20–31)
CREAT SERPL-MCNC: 1.05 MG/DL (ref 0.7–1.2)
CREATININE URINE: 75.5 MG/DL (ref 39–259)
EOSINOPHILS RELATIVE PERCENT: 1 % (ref 1–4)
GFR AFRICAN AMERICAN: >60 ML/MIN
GFR NON-AFRICAN AMERICAN: >60 ML/MIN
GFR SERPL CREATININE-BSD FRML MDRD: ABNORMAL ML/MIN/{1.73_M2}
GLUCOSE BLD-MCNC: 126 MG/DL (ref 70–99)
HCT VFR BLD CALC: 52.2 % (ref 40.7–50.3)
HEMOGLOBIN: 17.4 G/DL (ref 13–17)
IMMATURE GRANULOCYTES: 1 %
LYMPHOCYTES # BLD: 34 % (ref 24–43)
MCH RBC QN AUTO: 30.4 PG (ref 25.2–33.5)
MCHC RBC AUTO-ENTMCNC: 33.3 G/DL (ref 28.4–34.8)
MCV RBC AUTO: 91.3 FL (ref 82.6–102.9)
MONOCYTES # BLD: 9 % (ref 3–12)
NRBC AUTOMATED: 0 PER 100 WBC
PDW BLD-RTO: 14.6 % (ref 11.8–14.4)
PHOSPHORUS: 3.1 MG/DL (ref 2.5–4.5)
PLATELET # BLD: 203 K/UL (ref 138–453)
PMV BLD AUTO: 10.3 FL (ref 8.1–13.5)
POTASSIUM SERPL-SCNC: 4.5 MMOL/L (ref 3.7–5.3)
PTH INTACT: 31.1 PG/ML (ref 15–65)
RBC # BLD: 5.72 M/UL (ref 4.21–5.77)
RBC # BLD: ABNORMAL 10*6/UL
SEG NEUTROPHILS: 54 % (ref 36–65)
SEGMENTED NEUTROPHILS ABSOLUTE COUNT: 3.52 K/UL (ref 1.5–8.1)
SODIUM BLD-SCNC: 136 MMOL/L (ref 135–144)
TOTAL PROTEIN, URINE: 14 MG/DL
URINE TOTAL PROTEIN CREATININE RATIO: 0.19 (ref 0–0.2)
VITAMIN D 25-HYDROXY: 29.7 NG/ML
WBC # BLD: 6.5 K/UL (ref 3.5–11.3)

## 2022-04-30 LAB — VITAMIN D 1,25-DIHYDROXY: 50 PG/ML (ref 19.9–79.3)

## 2022-05-02 RX ORDER — ATORVASTATIN CALCIUM 20 MG/1
TABLET, FILM COATED ORAL
Qty: 90 TABLET | Refills: 1 | Status: SHIPPED | OUTPATIENT
Start: 2022-05-02

## 2022-05-02 NOTE — TELEPHONE ENCOUNTER
Davida Acosta is calling to request a refill on the following medication(s):    Medication Request:  Requested Prescriptions     Pending Prescriptions Disp Refills    atorvastatin (LIPITOR) 20 MG tablet [Pharmacy Med Name: ATORVASTATIN 20 MG TABLET] 90 tablet 1     Sig: take 1 tablet by mouth once daily       Last Visit Date (If Applicable):  4/59/6859    Next Visit Date:    7/19/2022

## 2022-05-05 ENCOUNTER — TELEPHONE (OUTPATIENT)
Dept: FAMILY MEDICINE CLINIC | Age: 67
End: 2022-05-05

## 2022-05-05 DIAGNOSIS — E11.9 TYPE 2 DIABETES MELLITUS WITHOUT COMPLICATION, WITH LONG-TERM CURRENT USE OF INSULIN (HCC): Primary | ICD-10-CM

## 2022-05-05 DIAGNOSIS — Z79.4 TYPE 2 DIABETES MELLITUS WITHOUT COMPLICATION, WITH LONG-TERM CURRENT USE OF INSULIN (HCC): Primary | ICD-10-CM

## 2022-05-05 RX ORDER — BLOOD-GLUCOSE METER
KIT MISCELLANEOUS
Qty: 100 STRIP | Refills: 2 | Status: SHIPPED | OUTPATIENT
Start: 2022-05-05 | End: 2022-05-10 | Stop reason: SDUPTHER

## 2022-05-05 RX ORDER — BLOOD-GLUCOSE METER
KIT MISCELLANEOUS
Qty: 100 STRIP | Refills: 2 | Status: SHIPPED | OUTPATIENT
Start: 2022-05-05 | End: 2022-05-05

## 2022-05-09 ENCOUNTER — TELEPHONE (OUTPATIENT)
Dept: FAMILY MEDICINE CLINIC | Age: 67
End: 2022-05-09

## 2022-05-09 NOTE — TELEPHONE ENCOUNTER
Patient will need a new script sent for test strips to include number of times he test and Dx code please do not include  PRN the script has to meet insurance guidelines.

## 2022-05-10 DIAGNOSIS — Z79.4 TYPE 2 DIABETES MELLITUS WITHOUT COMPLICATION, WITH LONG-TERM CURRENT USE OF INSULIN (HCC): ICD-10-CM

## 2022-05-10 DIAGNOSIS — Z79.4 CONTROLLED TYPE 2 DIABETES MELLITUS WITHOUT COMPLICATION, WITH LONG-TERM CURRENT USE OF INSULIN (HCC): Primary | ICD-10-CM

## 2022-05-10 DIAGNOSIS — E11.9 CONTROLLED TYPE 2 DIABETES MELLITUS WITHOUT COMPLICATION, WITH LONG-TERM CURRENT USE OF INSULIN (HCC): Primary | ICD-10-CM

## 2022-05-10 DIAGNOSIS — E11.9 TYPE 2 DIABETES MELLITUS WITHOUT COMPLICATION, WITH LONG-TERM CURRENT USE OF INSULIN (HCC): ICD-10-CM

## 2022-05-10 RX ORDER — BLOOD-GLUCOSE METER
KIT MISCELLANEOUS
Qty: 100 STRIP | Refills: 2 | Status: SHIPPED | OUTPATIENT
Start: 2022-05-10

## 2022-05-12 DIAGNOSIS — Z79.4 TYPE 2 DIABETES MELLITUS WITHOUT COMPLICATION, WITH LONG-TERM CURRENT USE OF INSULIN (HCC): Primary | ICD-10-CM

## 2022-05-12 DIAGNOSIS — E11.9 TYPE 2 DIABETES MELLITUS WITHOUT COMPLICATION, WITH LONG-TERM CURRENT USE OF INSULIN (HCC): Primary | ICD-10-CM

## 2022-05-12 NOTE — TELEPHONE ENCOUNTER
Fady Wilkerson is calling to request a refill on the following medication(s):    Medication Request:  Requested Prescriptions     Pending Prescriptions Disp Refills    FreeStyle Lancets MISC 100 each 1     Sig: use 1 LANCET to TEST BLOOD SUGAR three times a day       Last Visit Date (If Applicable):  2/65/9434    Next Visit Date:    7/19/2022

## 2022-05-13 RX ORDER — LANCETS 28 GAUGE
EACH MISCELLANEOUS
Qty: 100 EACH | Refills: 1 | Status: SHIPPED | OUTPATIENT
Start: 2022-05-13 | End: 2022-08-03 | Stop reason: SDUPTHER

## 2022-07-19 ENCOUNTER — TELEPHONE (OUTPATIENT)
Dept: FAMILY MEDICINE CLINIC | Age: 67
End: 2022-07-19

## 2022-07-19 ENCOUNTER — OFFICE VISIT (OUTPATIENT)
Dept: FAMILY MEDICINE CLINIC | Age: 67
End: 2022-07-19

## 2022-07-19 VITALS
SYSTOLIC BLOOD PRESSURE: 100 MMHG | HEART RATE: 74 BPM | TEMPERATURE: 97.2 F | WEIGHT: 250 LBS | OXYGEN SATURATION: 95 % | DIASTOLIC BLOOD PRESSURE: 68 MMHG | HEIGHT: 68 IN | BODY MASS INDEX: 37.89 KG/M2

## 2022-07-19 DIAGNOSIS — Z79.4 TYPE 2 DIABETES MELLITUS WITHOUT COMPLICATION, WITH LONG-TERM CURRENT USE OF INSULIN (HCC): ICD-10-CM

## 2022-07-19 DIAGNOSIS — Z13.6 SCREENING FOR AAA (ABDOMINAL AORTIC ANEURYSM): ICD-10-CM

## 2022-07-19 DIAGNOSIS — Z00.00 WELCOME TO MEDICARE PREVENTIVE VISIT: Primary | ICD-10-CM

## 2022-07-19 DIAGNOSIS — E11.9 TYPE 2 DIABETES MELLITUS WITHOUT COMPLICATION, WITH LONG-TERM CURRENT USE OF INSULIN (HCC): ICD-10-CM

## 2022-07-19 LAB — HBA1C MFR BLD: 6.9 %

## 2022-07-19 PROCEDURE — 83036 HEMOGLOBIN GLYCOSYLATED A1C: CPT | Performed by: STUDENT IN AN ORGANIZED HEALTH CARE EDUCATION/TRAINING PROGRAM

## 2022-07-19 PROCEDURE — 1123F ACP DISCUSS/DSCN MKR DOCD: CPT | Performed by: STUDENT IN AN ORGANIZED HEALTH CARE EDUCATION/TRAINING PROGRAM

## 2022-07-19 PROCEDURE — G0402 INITIAL PREVENTIVE EXAM: HCPCS | Performed by: STUDENT IN AN ORGANIZED HEALTH CARE EDUCATION/TRAINING PROGRAM

## 2022-07-19 PROCEDURE — 3044F HG A1C LEVEL LT 7.0%: CPT | Performed by: STUDENT IN AN ORGANIZED HEALTH CARE EDUCATION/TRAINING PROGRAM

## 2022-07-19 RX ORDER — SEMAGLUTIDE 1.34 MG/ML
0.25 INJECTION, SOLUTION SUBCUTANEOUS WEEKLY
Qty: 2 PEN | Refills: 1 | Status: SHIPPED | OUTPATIENT
Start: 2022-07-19 | End: 2022-10-31

## 2022-07-19 RX ORDER — ORAL SEMAGLUTIDE 3 MG/1
3 TABLET ORAL DAILY
Qty: 30 TABLET | Refills: 1 | Status: SHIPPED | OUTPATIENT
Start: 2022-07-19 | End: 2022-07-19

## 2022-07-19 ASSESSMENT — PATIENT HEALTH QUESTIONNAIRE - PHQ9
SUM OF ALL RESPONSES TO PHQ QUESTIONS 1-9: 0
1. LITTLE INTEREST OR PLEASURE IN DOING THINGS: 0
SUM OF ALL RESPONSES TO PHQ QUESTIONS 1-9: 0
SUM OF ALL RESPONSES TO PHQ9 QUESTIONS 1 & 2: 0
2. FEELING DOWN, DEPRESSED OR HOPELESS: 0
SUM OF ALL RESPONSES TO PHQ QUESTIONS 1-9: 0
SUM OF ALL RESPONSES TO PHQ QUESTIONS 1-9: 0

## 2022-07-19 ASSESSMENT — LIFESTYLE VARIABLES
HOW OFTEN DO YOU HAVE A DRINK CONTAINING ALCOHOL: 2-4 TIMES A MONTH
HOW MANY STANDARD DRINKS CONTAINING ALCOHOL DO YOU HAVE ON A TYPICAL DAY: 3 OR 4

## 2022-07-19 NOTE — PATIENT INSTRUCTIONS
Personalized Preventive Plan for Shania Ponce - 7/19/2022  Medicare offers a range of preventive health benefits. Some of the tests and screenings are paid in full while other may be subject to a deductible, co-insurance, and/or copay. Some of these benefits include a comprehensive review of your medical history including lifestyle, illnesses that may run in your family, and various assessments and screenings as appropriate. After reviewing your medical record and screening and assessments performed today your provider may have ordered immunizations, labs, imaging, and/or referrals for you. A list of these orders (if applicable) as well as your Preventive Care list are included within your After Visit Summary for your review. Other Preventive Recommendations:    A preventive eye exam performed by an eye specialist is recommended every 1-2 years to screen for glaucoma; cataracts, macular degeneration, and other eye disorders. A preventive dental visit is recommended every 6 months. Try to get at least 150 minutes of exercise per week or 10,000 steps per day on a pedometer . Order or download the FREE \"Exercise & Physical Activity: Your Everyday Guide\" from [employer]. Call 7-887.130.7881 or search [employer] online. You need 0230-8337 mg of calcium and 1929-9679 IU of vitamin D per day. It is possible to meet your calcium requirement with diet alone, but a vitamin D supplement is usually necessary to meet this goal.  When exposed to the sun, use a sunscreen that protects against both UVA and UVB radiation with an SPF of 30 or greater. Reapply every 2 to 3 hours or after sweating, drying off with a towel, or swimming. Always wear a seat belt when traveling in a car. Always wear a helmet when riding a bicycle or motorcycle.

## 2022-07-19 NOTE — PROGRESS NOTES
Medicare Annual Wellness Visit    Varsha Woods is here for Medicare AWV and Diabetes (                                                                                                                            )    Assessment & Plan   Welcome to Medicare preventive visit  Type 2 diabetes mellitus without complication, with long-term current use of insulin ([address]  -     POCT glycosylated hemoglobin (Hb A1C)  -     Semaglutide,0.25 or 0.5MG/DOS, (OZEMPIC, 0.25 OR 0.5 MG/DOSE,) 2 MG/1.5ML SOPN; Inject 0.25 mg into the skin once a week, Disp-2 pen, R-1Normal  Screening for AAA (abdominal aortic aneurysm)  -     [place] screening for AAA; Future    Recommendations for Preventive Services Due: see orders and patient instructions/AVS.  Recommended screening schedule for the next 5-10 years is provided to the patient in written form: see Patient Instructions/AVS.     Return in 3 months (on 10/19/2022) for Medicare Annual Wellness Visit in 1 year, Follow up DM/HTN, for 30 min. Subjective   The following acute and/or chronic problems were also addressed today: He says that he has been doing well on Farxiga and insulin but eventually would like to get off of daily insulin. His blood sugars have been well controlled. Agreeable to starting on Ozempic once a week. Plan to down titrate Basaglar once blood sugars are controlled. Follows with gastroenterology for liver cirrhosis. Due for AAA screening. Patient's complete Health Risk Assessment and screening values have been reviewed and are found in Flowsheets. The following problems were reviewed today and where indicated follow up appointments were made and/or referrals ordered. Positive Risk Factor Screenings with Interventions:     Cognitive:   Words recalled: 2 Words Recalled  Total Score Interpretation: Abnormal Mini-Cog  Cognitive Impairment Interventions:  Patient declines any further evaluation/treatment for cognitive impairment           General Health and ACP:  General  In general, how would you say your health is?: Good  In the past 7 days, have you experienced any of the following: New or Increased Pain, New or Increased Fatigue, Loneliness, Social Isolation, Stress or Anger?: No  Do you get the social and emotional support that you need?: Yes  Do you have a Living Will?: Yes    Advance Directives       Power of  Living Will ACP-Advance Directive ACP-Power of     Not on File Not on File Not on File Not on File          General Health Risk Interventions:  None    Health Habits/Nutrition:  Physical Activity: Insufficiently Active    Days of Exercise per Week: 3 days    Minutes of Exercise per Session: 20 min     Have you lost any weight without trying in the past 3 months?: No  Body mass index: (!) 38.01  Have you seen the dentist within the past year?: Yes  Health Habits/Nutrition Interventions:  None             Objective   Vitals:    07/19/22 1238   BP: 100/68   Pulse: 74   Temp: 97.2 °F (36.2 °C)   SpO2: 95%   Weight: 250 lb (113.4 kg)   Height: 5' 8\" (1.727 m)      Body mass index is 38.01 kg/m².       General Appearance: alert and oriented to person, place and time, well developed and well- nourished, in no acute distress  Skin: warm and dry, no rash or erythema  Head: normocephalic and atraumatic  Eyes: pupils equal, round, and reactive to light, extraocular eye movements intact, conjunctivae normal  ENT: tympanic membrane, external ear and ear canal normal bilaterally, nose without deformity, nasal mucosa and turbinates normal without polyps  Neck: supple and non-tender without mass, no thyromegaly or thyroid nodules, no cervical lymphadenopathy  Pulmonary/Chest: clear to auscultation bilaterally- no wheezes, rales or rhonchi, normal air movement, no respiratory distress  Cardiovascular: normal rate, regular rhythm, normal S1 and S2, no murmurs, rubs, clicks, or gallops, distal pulses intact, no carotid bruits  Abdomen: soft, non-tender, non-distended, normal bowel sounds, no masses or organomegaly  Extremities: no cyanosis, clubbing or edema  Musculoskeletal: normal range of motion, no joint swelling, deformity or tenderness  Neurologic: reflexes normal and symmetric, no cranial nerve deficit, gait, coordination and speech normal       No Known Allergies  Prior to Visit Medications    Medication Sig Taking? Authorizing Provider   Semaglutide,0.25 or 0.5MG/DOS, (OZEMPIC, 0.25 OR 0.5 MG/DOSE,) 2 MG/1.5ML SOPN Inject 0.25 mg into the skin once a week Yes Steph Morales MD   FreeStyle Lancets MISC use 1 LANCET to TEST BLOOD SUGAR three times a day Yes Steph Morales MD   blood glucose test strips (FREESTYLE LITE) strip USE 1 TEST STRIP TO TEST BLOOD SUGAR 3 TIMES DAILY Yes Steph Morales MD   atorvastatin (LIPITOR) 20 MG tablet take 1 tablet by mouth once daily Yes Steph Morales MD   NOVOFINE PLUS PEN NEEDLE 32G X 4 MM MISC use 1 PEN NEEDLE to inject MEDICATION subcutaneously four times a day with HUMALOG AND LANTUS Yes TAMIE Dubois CNP   insulin glargine (BASAGLAR KWIKPEN) 100 UNIT/ML injection pen INJECT 20 UNITS SUBCUTANEOUSLY AT BEDTIME AND INJECT 10 UNITS SUBCUTANEOUSLY IN THE MORNING.  Yes TAMIE Dubois CNP   dapagliflozin (FARXIGA) 10 MG tablet Take 1 tablet by mouth every morning Yes Steph Morales MD   metoprolol tartrate (LOPRESSOR) 25 MG tablet Take 1 tablet by mouth 2 times daily Yes Steph Morales MD   B-D UF III MINI PEN NEEDLES 31G X 5 MM MISC use 1 PEN NEEDLE to inject MEDICATION subcutaneously four times a day with Halina Zavala Yes Steph Morales MD       [facility] (Including outside providers/suppliers regularly involved in providing care):   Patient Care Team:  Steph Morales MD as PCP - General (Family Medicine)  Steph Morales MD as PCP - REHABILITATION [facility] Empaneled Provider     Reviewed and updated this visit:  Tobacco  Allergies  Meds  Problems  Med Hx  Surg Hx  Soc Hx  Fam Hx

## 2022-08-03 DIAGNOSIS — E11.9 TYPE 2 DIABETES MELLITUS WITHOUT COMPLICATION, WITH LONG-TERM CURRENT USE OF INSULIN (HCC): ICD-10-CM

## 2022-08-03 DIAGNOSIS — Z79.4 TYPE 2 DIABETES MELLITUS WITHOUT COMPLICATION, WITH LONG-TERM CURRENT USE OF INSULIN (HCC): ICD-10-CM

## 2022-08-03 RX ORDER — LANCETS 28 GAUGE
EACH MISCELLANEOUS
Qty: 100 EACH | Refills: 1 | Status: SHIPPED | OUTPATIENT
Start: 2022-08-03

## 2022-08-03 NOTE — TELEPHONE ENCOUNTER
Kathrin Ramirez is calling to request a refill on the following medication(s):    Medication Request:  Requested Prescriptions     Pending Prescriptions Disp Refills    FreeStyle Lancets MISC 100 each 1     Sig: use 1 LANCET to TEST BLOOD SUGAR three times a day       Last Visit Date (If Applicable):  1/89/7053    Next Visit Date:    10/19/2022

## 2022-08-08 ENCOUNTER — TELEPHONE (OUTPATIENT)
Dept: ONCOLOGY | Age: 67
End: 2022-08-08

## 2022-08-08 ENCOUNTER — OFFICE VISIT (OUTPATIENT)
Dept: ONCOLOGY | Age: 67
End: 2022-08-08
Payer: MEDICARE

## 2022-08-08 ENCOUNTER — HOSPITAL ENCOUNTER (OUTPATIENT)
Age: 67
Discharge: HOME | End: 2022-08-08
Payer: MEDICARE

## 2022-08-08 VITALS
TEMPERATURE: 97.1 F | SYSTOLIC BLOOD PRESSURE: 119 MMHG | BODY MASS INDEX: 37.81 KG/M2 | DIASTOLIC BLOOD PRESSURE: 85 MMHG | HEART RATE: 89 BPM | WEIGHT: 248.7 LBS

## 2022-08-08 DIAGNOSIS — D75.1 POLYCYTHEMIA: Primary | ICD-10-CM

## 2022-08-08 DIAGNOSIS — D75.1 POLYCYTHEMIA: ICD-10-CM

## 2022-08-08 LAB
ABSOLUTE EOS #: 0.1 K/UL (ref 0–0.4)
ABSOLUTE LYMPH #: 1.8 K/UL (ref 1–4.8)
ABSOLUTE MONO #: 0.5 K/UL (ref 0.1–1.2)
BASOPHILS # BLD: 1 % (ref 0–2)
BASOPHILS ABSOLUTE: 0 K/UL (ref 0–0.2)
EOSINOPHILS RELATIVE PERCENT: 1 % (ref 1–4)
HCT VFR BLD CALC: 52.5 % (ref 41–53)
HEMOGLOBIN: 17.7 G/DL (ref 13.5–17.5)
LYMPHOCYTES # BLD: 28 % (ref 24–44)
MCH RBC QN AUTO: 30.6 PG (ref 26–34)
MCHC RBC AUTO-ENTMCNC: 33.8 G/DL (ref 31–37)
MCV RBC AUTO: 90.6 FL (ref 80–100)
MONOCYTES # BLD: 8 % (ref 2–11)
PDW BLD-RTO: 14.5 % (ref 12.5–15.4)
PLATELET # BLD: 164 K/UL (ref 140–450)
PMV BLD AUTO: 7.9 FL (ref 6–12)
RBC # BLD: 5.79 M/UL (ref 4.5–5.9)
SEG NEUTROPHILS: 62 % (ref 36–66)
SEGMENTED NEUTROPHILS ABSOLUTE COUNT: 3.9 K/UL (ref 1.8–7.7)
WBC # BLD: 6.3 K/UL (ref 3.5–11)

## 2022-08-08 PROCEDURE — 36415 COLL VENOUS BLD VENIPUNCTURE: CPT

## 2022-08-08 PROCEDURE — 3017F COLORECTAL CA SCREEN DOC REV: CPT | Performed by: INTERNAL MEDICINE

## 2022-08-08 PROCEDURE — 99214 OFFICE O/P EST MOD 30 MIN: CPT | Performed by: INTERNAL MEDICINE

## 2022-08-08 PROCEDURE — 99211 OFF/OP EST MAY X REQ PHY/QHP: CPT | Performed by: INTERNAL MEDICINE

## 2022-08-08 PROCEDURE — 1036F TOBACCO NON-USER: CPT | Performed by: INTERNAL MEDICINE

## 2022-08-08 PROCEDURE — 85025 COMPLETE CBC W/AUTO DIFF WBC: CPT

## 2022-08-08 PROCEDURE — G8417 CALC BMI ABV UP PARAM F/U: HCPCS | Performed by: INTERNAL MEDICINE

## 2022-08-08 PROCEDURE — 1123F ACP DISCUSS/DSCN MKR DOCD: CPT | Performed by: INTERNAL MEDICINE

## 2022-08-08 PROCEDURE — G8427 DOCREV CUR MEDS BY ELIG CLIN: HCPCS | Performed by: INTERNAL MEDICINE

## 2022-08-08 NOTE — TELEPHONE ENCOUNTER
RV 6 months with CBC at [facility]    AVS placed in recall drawer, recall entered into epic, pt will be called once schedule is built    PT was given AVS and appt schedule    Electronically signed by Ni Thornton on 8/8/2022 at 11:16 AM

## 2022-08-08 NOTE — PROGRESS NOTES
_           Chief Complaint   Patient presents with    Follow-up     Review status of disease    Discuss Labs     DIAGNOSIS:         Polycythemia, reactive polycythemia  History of tobacco abuse. Quit 7 years ago  Overweight  Probable sleep apnea  Hepatitis C  Mild chronic renal insufficiency  CURRENT THERAPY:         Observation monitoring of polycythemia    BRIEF CASE HISTORY:      Mr. Lynne Levy is a very pleasant 74 y.o. male with history of multiple co morbidities as listed. The patient is referred for evaluation and management of polycythemia. The patient was noted to have elevation of red blood cells over the last few lab tests. Patient denies any headaches or dizziness. No TIA or CVA-like symptoms. No chest pain or anginal symptoms. The patient had history of mild chronic renal insufficiency. He has history of diabetes and liver disease. He had history of hepatitis C on treatment. He is overweight. The patient managed to quit smoking 7 years ago. Used to smoke 1 pack/day for about 20 years. Denies alcohol drinking. .     INTERIM HISTORY:   Patient seen for follow-up polycythemia. He is clinically stable. No chest pain or anginal symptoms. No TIA or CVA-like symptoms. No headaches. No fever or infections. No other complaints. PAST MEDICAL HISTORY: has a past medical history of Hepatitis C, Hypertension, Liver disease, Non-rheumatic mitral regurgitation, Renal insufficiency, Severe obesity (BMI 35.0-39.9), and Ventricular tachycardia ([address] PAST SURGICAL HISTORY: has no past surgical history on file.      CURRENT MEDICATIONS:  has a current medication list which includes the following prescription(s): freestyle lancets, ozempic (0.25 or 0.5 mg/dose), freestyle lite, atorvastatin, novofine plus pen needle, basaglar kwikpen, dapagliflozin, metoprolol tartrate, and b-d uf iii mini pen needles. ALLERGIES:  has No Known Allergies. FAMILY HISTORY: Mother had breast cancer. Otherwise negative for any hematological or oncological conditions. SOCIAL HISTORY:  reports that he quit smoking about 7 years ago. His smoking use included cigarettes. He started smoking about 45 years ago. He has a 60.00 pack-year smoking history. He has never used smokeless tobacco. He reports current alcohol use of about 7.0 standard drinks per week. He reports that he does not use drugs. REVIEW OF SYSTEMS:     General: No weakness or fatigue. No unanticipated weight loss or decreased appetite. No fever or chills. Eyes: No blurred vision, eye pain or double vision. Ears: No hearing problems or drainage. No tinnitus. Throat: No sore throat, problems with swallowing or dysphagia. Respiratory: No cough, sputum or hemoptysis. No shortness of breath. No pleuritic chest pain. Cardiovascular: No chest pain, orthopnea or PND. No lower extremity edema. No palpitation. Gastrointestinal: No problems with swallowing. No abdominal pain or bloating. No nausea or vomiting. No diarrhea or constipation. No GI bleeding. Genitourinary: No dysuria, hematuria, frequency or urgency. Musculoskeletal: No muscle aches or pains. No limitation of movement. No back pain. No gait disturbance, No joint complaints. Dermatologic: No skin rashes or pruritus. No skin lesions or discolorations. Psychiatric: No depression, anxiety, or stress or signs of schizophrenia. No change in mood or affect. Hematologic: No history of bleeding tendency. No bruises or ecchymosis. No history of clotting problems. Infectious disease: No fever, chills or frequent infections. Endocrine: No polydipsia or polyuria. No temperature intolerance. Neurologic: No headaches or dizziness. No weakness or numbness of the extremities. No changes in balance, coordination,  memory, mentation, behavior.    Allergic/Immunologic: No nasal congestion or hives. No repeated infections. PHYSICAL EXAM:  The patient is not in acute distress. Vital signs: Blood pressure 119/85, pulse 89, temperature 97.1 °F (36.2 °C), temperature source Temporal, weight 248 lb 11.2 oz (112.8 kg).      General appearance - well appearing, not in pain or distress  Mental status - good mood, alert and oriented  Eyes - pupils equal and reactive, extraocular eye movements intact  Ears - bilateral TM's and external ear canals normal  Nose - normal and patent, no erythema, discharge or polyps  Mouth - mucous membranes moist, pharynx normal without lesions  Neck - supple, no significant adenopathy  Lymphatics - no palpable lymphadenopathy, no hepatosplenomegaly  Chest - clear to auscultation, no wheezes, rales or rhonchi, symmetric air entry  Heart - normal rate, regular rhythm, normal S1, S2, no murmurs, rubs, clicks or gallops  Abdomen - soft, nontender, nondistended, no masses or organomegaly  Neurological - alert, oriented, normal speech, no focal findings or movement disorder noted  Musculoskeletal - no joint tenderness, deformity or swelling  Extremities - peripheral pulses normal, no pedal edema, no clubbing or cyanosis  Skin - normal coloration and turgor, no rashes, no suspicious skin lesions noted     Review of Diagnostic data:   Lab Results   Component Value Date    WBC 6.3 08/08/2022    HGB 17.7 (H) 08/08/2022    HCT 52.5 08/08/2022    MCV 90.6 08/08/2022     08/08/2022       Chemistry        Component Value Date/Time     04/27/2022 0937    K 4.5 04/27/2022 0937     04/27/2022 0937    CO2 22 04/27/2022 0937    BUN 19 04/27/2022 0937    CREATININE 1.05 04/27/2022 0937        Component Value Date/Time    CALCIUM 10.3 04/27/2022 0937    ALKPHOS 88 12/16/2020 0827    AST 29 12/16/2020 0827    ALT 37 12/16/2020 0827    BILITOT 0.67 12/16/2020 0827        JAK2 gene mutation normal  Erythropoietin level normal    IMPRESSION:   Polycythemia, reactive polycythemia  History of tobacco abuse. Quit 7 years ago  Overweight  Probable sleep apnea  Hepatitis C  Mild chronic renal insufficiency    PLAN: I reviewed the labs as above and discussed with the patient. I explained to the patient the nature of this hematologic problem. I explained the significance of these abnormalities in layman language. Reviewing the patient's labs over the last several years, patient is having progressive increase in the red blood cells but it is not symptomatic and patient is not having any complications related to that problem. Further labs showed normal JAK2 gene mutation and normal erythropoietin level. Polycythemia vera is quite unlikely. I believe this is reactive resulting in secondary to patient's history of smoking as well as probably due to underlying sleep apnea. I recommended evaluation and testing for sleep apnea but patient declines. He already managed to quit smoking. I recommended maintaining good oxygenation and to try to control his weight. That may help controlling the probable sleep apnea. We will monitor labs and repeat CBC in 6 months. Patient's questions were answered to the best of his satisfaction and he verbalized full understanding and agreement.

## 2022-08-17 DIAGNOSIS — Z79.4 CONTROLLED TYPE 2 DIABETES MELLITUS WITHOUT COMPLICATION, WITH LONG-TERM CURRENT USE OF INSULIN (HCC): ICD-10-CM

## 2022-08-17 DIAGNOSIS — E11.9 CONTROLLED TYPE 2 DIABETES MELLITUS WITHOUT COMPLICATION, WITH LONG-TERM CURRENT USE OF INSULIN (HCC): ICD-10-CM

## 2022-08-17 NOTE — TELEPHONE ENCOUNTER
Ramon Finn is calling to request a refill on the following medication(s):    Medication Request:  Requested Prescriptions     Pending Prescriptions Disp Refills    BASAGLAR KWIKPEN 100 UNIT/ML injection pen [Pharmacy Med Name: Basaglar KwikPen Subcutaneous Solution Pen-injector 100 UNIT/ML] 42 mL 0     Sig: INJECT 20 UNITS SUBCUTANEOUSLY AT BEDTIME AND INJECT 10 UNITS SUBCUTANEOUSLY IN THE MORNING       Last Visit Date (If Applicable):  Visit date not found    Next Visit Date:    Visit date not found

## 2022-08-18 RX ORDER — INSULIN GLARGINE 100 [IU]/ML
INJECTION, SOLUTION SUBCUTANEOUS
Qty: 42 ML | Refills: 0 | Status: SHIPPED | OUTPATIENT
Start: 2022-08-18 | End: 2022-10-28

## 2022-09-07 ENCOUNTER — HOSPITAL ENCOUNTER (OUTPATIENT)
Age: 67
Setting detail: SPECIMEN
Discharge: HOME | End: 2022-09-07

## 2022-09-07 DIAGNOSIS — N18.31 STAGE 3A CHRONIC KIDNEY DISEASE (HCC): ICD-10-CM

## 2022-09-07 LAB
ABSOLUTE EOS #: 0.05 K/UL (ref 0–0.44)
ABSOLUTE IMMATURE GRANULOCYTE: 0.03 K/UL (ref 0–0.3)
ABSOLUTE LYMPH #: 2.11 K/UL (ref 1.1–3.7)
ABSOLUTE MONO #: 0.66 K/UL (ref 0.1–1.2)
ANION GAP SERPL CALCULATED.3IONS-SCNC: 16 MMOL/L (ref 9–17)
BASOPHILS # BLD: 1 % (ref 0–2)
BASOPHILS ABSOLUTE: 0.05 K/UL (ref 0–0.2)
BUN BLDV-MCNC: 16 MG/DL (ref 8–23)
CALCIUM IONIZED: 1.38 MMOL/L (ref 1.13–1.33)
CALCIUM SERPL-MCNC: 10.2 MG/DL (ref 8.6–10.4)
CHLORIDE BLD-SCNC: 100 MMOL/L (ref 98–107)
CO2: 19 MMOL/L (ref 20–31)
CREAT SERPL-MCNC: 1.08 MG/DL (ref 0.7–1.2)
CREATININE URINE: 75.2 MG/DL (ref 39–259)
CREATININE URINE: 76.5 MG/DL (ref 39–259)
EOSINOPHILS RELATIVE PERCENT: 1 % (ref 1–4)
GFR AFRICAN AMERICAN: >60 ML/MIN
GFR NON-AFRICAN AMERICAN: >60 ML/MIN
GFR SERPL CREATININE-BSD FRML MDRD: ABNORMAL ML/MIN/{1.73_M2}
GLUCOSE BLD-MCNC: 148 MG/DL (ref 70–99)
HCT VFR BLD CALC: 54.1 % (ref 40.7–50.3)
HEMOGLOBIN: 17.6 G/DL (ref 13–17)
IMMATURE GRANULOCYTES: 1 %
LYMPHOCYTES # BLD: 33 % (ref 24–43)
MCH RBC QN AUTO: 30 PG (ref 25.2–33.5)
MCHC RBC AUTO-ENTMCNC: 32.5 G/DL (ref 28.4–34.8)
MCV RBC AUTO: 92.2 FL (ref 82.6–102.9)
MONOCYTES # BLD: 10 % (ref 3–12)
NRBC AUTOMATED: 0 PER 100 WBC
PDW BLD-RTO: 13.8 % (ref 11.8–14.4)
PHOSPHORUS: 3.2 MG/DL (ref 2.5–4.5)
PLATELET # BLD: 198 K/UL (ref 138–453)
PMV BLD AUTO: 10.3 FL (ref 8.1–13.5)
POTASSIUM SERPL-SCNC: 4.3 MMOL/L (ref 3.7–5.3)
PTH INTACT: 31.5 PG/ML (ref 14–72)
RBC # BLD: 5.87 M/UL (ref 4.21–5.77)
SEG NEUTROPHILS: 54 % (ref 36–65)
SEGMENTED NEUTROPHILS ABSOLUTE COUNT: 3.58 K/UL (ref 1.5–8.1)
SODIUM BLD-SCNC: 135 MMOL/L (ref 135–144)
TOTAL PROTEIN, URINE: 7 MG/DL
TOTAL PROTEIN, URINE: 7 MG/DL
URINE TOTAL PROTEIN CREATININE RATIO: 0.09 (ref 0–0.2)
VITAMIN D 25-HYDROXY: 35.5 NG/ML
WBC # BLD: 6.5 K/UL (ref 3.5–11.3)

## 2022-09-20 ENCOUNTER — HOSPITAL ENCOUNTER (OUTPATIENT)
Dept: VASCULAR LAB | Age: 67
Discharge: HOME | End: 2022-09-20
Payer: MEDICARE

## 2022-09-20 DIAGNOSIS — Z13.6 SCREENING FOR AAA (ABDOMINAL AORTIC ANEURYSM): ICD-10-CM

## 2022-09-20 PROCEDURE — 76706 US ABDL AORTA SCREEN AAA: CPT

## 2022-09-27 DIAGNOSIS — Z79.4 TYPE 2 DIABETES MELLITUS WITHOUT COMPLICATION, WITH LONG-TERM CURRENT USE OF INSULIN (HCC): ICD-10-CM

## 2022-09-27 DIAGNOSIS — E11.9 TYPE 2 DIABETES MELLITUS WITHOUT COMPLICATION, WITH LONG-TERM CURRENT USE OF INSULIN (HCC): ICD-10-CM

## 2022-09-27 RX ORDER — DAPAGLIFLOZIN 10 MG/1
TABLET, FILM COATED ORAL
Qty: 30 TABLET | Refills: 5 | Status: SHIPPED | OUTPATIENT
Start: 2022-09-27

## 2022-09-27 RX ORDER — PEN NEEDLE, DIABETIC 32GX 5/32"
NEEDLE, DISPOSABLE MISCELLANEOUS
Qty: 100 EACH | Refills: 1 | Status: SHIPPED | OUTPATIENT
Start: 2022-09-27

## 2022-09-27 NOTE — TELEPHONE ENCOUNTER
Isaac Rodriguez is calling to request a refill on the following medication(s):    Medication Request:  Requested Prescriptions     Pending Prescriptions Disp Refills    BD PEN NEEDLE TERESITA 2ND GEN 32G X 4 MM MISC [Pharmacy Med Name: BD Pen Needle Teresita 2nd Gen Miscellaneous 32G X 4 MM] 100 each 0     Sig: USE WITH INSULIN FOUR TIMES DAILY       Last Visit Date (If Applicable):  Visit date not found    Next Visit Date:    Visit date not found

## 2022-09-27 NOTE — TELEPHONE ENCOUNTER
Normand Osgood is calling to request a refill on the following medication(s):    Medication Request:  Requested Prescriptions     Pending Prescriptions Disp Refills    FARXIGA 10 MG tablet [Pharmacy Med Name: [place] Oral Tablet 10 MG] 30 tablet 0     Sig: TAKE 1 TABLET BY MOUTH IN THE MORNING       Last Visit Date (If Applicable):  8/86/7717    Next Visit Date:    10/19/2022

## 2022-10-19 ENCOUNTER — OFFICE VISIT (OUTPATIENT)
Dept: FAMILY MEDICINE CLINIC | Age: 67
End: 2022-10-19
Payer: MEDICARE

## 2022-10-19 VITALS
SYSTOLIC BLOOD PRESSURE: 121 MMHG | WEIGHT: 251.8 LBS | HEIGHT: 68 IN | BODY MASS INDEX: 38.16 KG/M2 | DIASTOLIC BLOOD PRESSURE: 81 MMHG | OXYGEN SATURATION: 95 % | TEMPERATURE: 97.2 F | HEART RATE: 73 BPM

## 2022-10-19 DIAGNOSIS — Z23 NEEDS FLU SHOT: ICD-10-CM

## 2022-10-19 DIAGNOSIS — Z86.19 HX OF HEPATITIS C: ICD-10-CM

## 2022-10-19 DIAGNOSIS — E11.9 TYPE 2 DIABETES MELLITUS WITHOUT COMPLICATION, WITH LONG-TERM CURRENT USE OF INSULIN (HCC): Primary | ICD-10-CM

## 2022-10-19 DIAGNOSIS — Z79.4 TYPE 2 DIABETES MELLITUS WITHOUT COMPLICATION, WITH LONG-TERM CURRENT USE OF INSULIN (HCC): Primary | ICD-10-CM

## 2022-10-19 DIAGNOSIS — I10 ESSENTIAL HYPERTENSION: ICD-10-CM

## 2022-10-19 LAB — HBA1C MFR BLD: 6.8 %

## 2022-10-19 PROCEDURE — 2022F DILAT RTA XM EVC RTNOPTHY: CPT | Performed by: STUDENT IN AN ORGANIZED HEALTH CARE EDUCATION/TRAINING PROGRAM

## 2022-10-19 PROCEDURE — G8484 FLU IMMUNIZE NO ADMIN: HCPCS | Performed by: STUDENT IN AN ORGANIZED HEALTH CARE EDUCATION/TRAINING PROGRAM

## 2022-10-19 PROCEDURE — 1036F TOBACCO NON-USER: CPT | Performed by: STUDENT IN AN ORGANIZED HEALTH CARE EDUCATION/TRAINING PROGRAM

## 2022-10-19 PROCEDURE — 3044F HG A1C LEVEL LT 7.0%: CPT | Performed by: STUDENT IN AN ORGANIZED HEALTH CARE EDUCATION/TRAINING PROGRAM

## 2022-10-19 PROCEDURE — G0008 ADMIN INFLUENZA VIRUS VAC: HCPCS | Performed by: STUDENT IN AN ORGANIZED HEALTH CARE EDUCATION/TRAINING PROGRAM

## 2022-10-19 PROCEDURE — 83036 HEMOGLOBIN GLYCOSYLATED A1C: CPT | Performed by: STUDENT IN AN ORGANIZED HEALTH CARE EDUCATION/TRAINING PROGRAM

## 2022-10-19 PROCEDURE — G8417 CALC BMI ABV UP PARAM F/U: HCPCS | Performed by: STUDENT IN AN ORGANIZED HEALTH CARE EDUCATION/TRAINING PROGRAM

## 2022-10-19 PROCEDURE — 99214 OFFICE O/P EST MOD 30 MIN: CPT | Performed by: STUDENT IN AN ORGANIZED HEALTH CARE EDUCATION/TRAINING PROGRAM

## 2022-10-19 PROCEDURE — 90694 VACC AIIV4 NO PRSRV 0.5ML IM: CPT | Performed by: STUDENT IN AN ORGANIZED HEALTH CARE EDUCATION/TRAINING PROGRAM

## 2022-10-19 PROCEDURE — 1123F ACP DISCUSS/DSCN MKR DOCD: CPT | Performed by: STUDENT IN AN ORGANIZED HEALTH CARE EDUCATION/TRAINING PROGRAM

## 2022-10-19 PROCEDURE — 3017F COLORECTAL CA SCREEN DOC REV: CPT | Performed by: STUDENT IN AN ORGANIZED HEALTH CARE EDUCATION/TRAINING PROGRAM

## 2022-10-19 PROCEDURE — G8427 DOCREV CUR MEDS BY ELIG CLIN: HCPCS | Performed by: STUDENT IN AN ORGANIZED HEALTH CARE EDUCATION/TRAINING PROGRAM

## 2022-10-19 SDOH — ECONOMIC STABILITY: FOOD INSECURITY: WITHIN THE PAST 12 MONTHS, YOU WORRIED THAT YOUR FOOD WOULD RUN OUT BEFORE YOU GOT MONEY TO BUY MORE.: NEVER TRUE

## 2022-10-19 SDOH — ECONOMIC STABILITY: FOOD INSECURITY: WITHIN THE PAST 12 MONTHS, THE FOOD YOU BOUGHT JUST DIDN'T LAST AND YOU DIDN'T HAVE MONEY TO GET MORE.: NEVER TRUE

## 2022-10-19 ASSESSMENT — ENCOUNTER SYMPTOMS
EYE DISCHARGE: 0
WHEEZING: 0
VOMITING: 0
ABDOMINAL PAIN: 0
SHORTNESS OF BREATH: 0
DIARRHEA: 0
CONSTIPATION: 0
NAUSEA: 0
CHEST TIGHTNESS: 0
SORE THROAT: 0
COUGH: 0

## 2022-10-19 ASSESSMENT — SOCIAL DETERMINANTS OF HEALTH (SDOH): HOW HARD IS IT FOR YOU TO PAY FOR THE VERY BASICS LIKE FOOD, HOUSING, MEDICAL CARE, AND HEATING?: NOT HARD AT ALL

## 2022-10-19 NOTE — PROGRESS NOTES
[address] [facility]  [address] [place] [address]  Dept: [phone]  Dept Fax: [phone]    Jamilah Reis is a 74 y.o. male who is a Established patient, who presents today for his medical conditions/complaints as noted below:  Chief Complaint   Patient presents with    Diabetes    Hypertension         HPI:     He is here today to follow-up on diabetes and hypertension. He says that he got Ozempic initially from the pharmacy and used it and it was working well. Says that when he went back for the refill they told him that his co-pay was very high so he did not pick it up. He says that he was recently told that he could get it at a lower cost so he is going to go back and check again. He says that otherwise he has been doing well and taking all his medications as prescribed. He has history of hepatitis C that was treated. He has not received hepatitis a and B vaccines but says that he will do it next visit. Hemoglobin A1C (%)   Date Value   10/19/2022 6.8   06/09/2022 6.9   04/18/2022 6.7             ( goal A1Cis < 7)   Microalb/Crt. Ratio (mcg/mg creat)   Date Value   01/06/2022 34 (H)     LDL Cholesterol (mg/dL)   Date Value   01/06/2022 71   12/16/2020 132 (H)   11/15/2019 110       (goal LDL is <100)   AST (U/L)   Date Value   12/16/2020 29     ALT (U/L)   Date Value   12/16/2020 37     BUN (mg/dL)   Date Value   09/07/2022 16     BP Readings from Last 3 Encounters:   10/19/22 121/81   09/15/22 124/72   08/08/22 119/85          (goal 120/80)    Past Medical History:   Diagnosis Date    Hepatitis C     Hypertension     Liver disease     sees GI, treated for Hep C     Non-rheumatic mitral regurgitation     sees cardio,    Renal insufficiency 6/26/2018    Severe obesity (BMI 35.0-39.9) 9/11/2018    Ventricular tachycardia       History reviewed. No pertinent surgical history.     Family History   Problem Relation Age of Onset    Breast Cancer Mother Cancer Mother     Coronary Art Dis Father         fatal MI 72       Social History     Tobacco Use    Smoking status: Former     Packs/day: 1.50     Years: 40.00     Pack years: 60.00     Types: Cigarettes     Start date: 1977     Quit date: 2015     Years since quittin.5    Smokeless tobacco: Never    Tobacco comments:     quit     Substance Use Topics    Alcohol use: Yes     Alcohol/week: 7.0 standard drinks     Types: 7 Cans of beer per week     Comment: 7 beers/w; ; prev drinking 3-4 X/wk, apprx 40 y      Current Outpatient Medications   Medication Sig Dispense Refill    FARXIGA 10 MG tablet TAKE 1 TABLET BY MOUTH IN THE MORNING 30 tablet 5    BD PEN NEEDLE TERESITA 2ND GEN 32G X 4 MM MISC USE WITH INSULIN FOUR TIMES DAILY 100 each 1    BASAGLAR KWIKPEN 100 UNIT/ML injection pen INJECT 20 UNITS SUBCUTANEOUSLY AT BEDTIME AND INJECT 10 UNITS SUBCUTANEOUSLY IN THE MORNING 42 mL 0    FreeStyle Lancets MISC use 1 LANCET to TEST BLOOD SUGAR three times a day 100 each 1    Semaglutide,0.25 or 0.5MG/DOS, (OZEMPIC, 0.25 OR 0.5 MG/DOSE,) 2 MG/1.5ML SOPN Inject 0.25 mg into the skin once a week 2 pen 1    blood glucose test strips (FREESTYLE LITE) strip USE 1 TEST STRIP TO TEST BLOOD SUGAR 3 TIMES DAILY 100 strip 2    atorvastatin (LIPITOR) 20 MG tablet take 1 tablet by mouth once daily 90 tablet 1    metoprolol tartrate (LOPRESSOR) 25 MG tablet Take 1 tablet by mouth 2 times daily 180 tablet 3    B-D UF III MINI PEN NEEDLES 31G X 5 MM MISC use 1 PEN NEEDLE to inject MEDICATION subcutaneously four times a day with HUMALOG AND LANTUS 100 each 3     No current facility-administered medications for this visit.      No Known Allergies    Health Maintenance   Topic Date Due    Hepatitis A vaccine (1 of 2 - Risk 2-dose series) Never done    Hepatitis B vaccine (1 of 3 - Risk 3-dose series) Never done    Shingles vaccine (1 of 2) Never done    COVID-19 Vaccine (3 - Booster for Johny series) 07/10/2021 Colorectal Cancer Screen  12/08/2022    Diabetic microalbuminuria test  01/06/2023    Lipids  01/06/2023    Diabetic foot exam  01/18/2023    Low dose CT lung screening  02/08/2023    Diabetic retinal exam  06/23/2023    Depression Screen  07/19/2023    A1C test (Diabetic or Prediabetic)  10/19/2023    DTaP/Tdap/Td vaccine (2 - Td or Tdap) 07/06/2028    Flu vaccine  Completed    Pneumococcal 65+ years Vaccine  Completed    AAA screen  Completed    HIV screen  Completed    Hib vaccine  Aged Out    Meningococcal (ACWY) vaccine  Aged Out       Subjective:     Review of Systems   Constitutional:  Negative for appetite change, fatigue and fever. HENT:  Negative for congestion, ear pain, hearing loss and sore throat. Eyes:  Negative for discharge and visual disturbance. Respiratory:  Negative for cough, chest tightness, shortness of breath and wheezing. Cardiovascular:  Negative for chest pain, palpitations and leg swelling. Gastrointestinal:  Negative for abdominal pain, constipation, diarrhea, nausea and vomiting. Genitourinary:  Negative for flank pain, frequency, hematuria and urgency. Musculoskeletal:  Negative for arthralgias, gait problem, joint swelling and myalgias. Skin: Negative. Neurological:  Negative for dizziness, weakness, numbness and headaches. Psychiatric/Behavioral: Negative. Negative for dysphoric mood. The patient is not nervous/anxious. Objective:     Physical Exam  Vitals reviewed. Constitutional:       Appearance: Normal appearance. He is obese. HENT:      Head: Normocephalic and atraumatic. Nose: Nose normal.      Mouth/Throat:      Mouth: Mucous membranes are moist.      Pharynx: Oropharynx is clear. Eyes:      Extraocular Movements: Extraocular movements intact. Conjunctiva/sclera: Conjunctivae normal.      Pupils: Pupils are equal, round, and reactive to light. Cardiovascular:      Rate and Rhythm: Normal rate and regular rhythm.       Heart sounds: Normal heart sounds. No murmur heard. No gallop. Pulmonary:      Effort: Pulmonary effort is normal. No respiratory distress. Breath sounds: Normal breath sounds. No stridor. No wheezing. Abdominal:      General: Bowel sounds are normal. There is no distension. Palpations: Abdomen is soft. Tenderness: There is no abdominal tenderness. There is no guarding or rebound. Musculoskeletal:         General: No swelling or tenderness. Normal range of motion. Cervical back: Normal range of motion and neck supple. Skin:     General: Skin is warm and dry. Coloration: Skin is not jaundiced. Findings: No rash. Neurological:      General: No focal deficit present. Mental Status: He is alert and oriented to person, place, and time. Psychiatric:         Mood and Affect: Mood normal.         Behavior: Behavior normal.         Thought Content: Thought content normal.         Judgment: Judgment normal.     /81   Pulse 73   Temp 97.2 °F (36.2 °C)   Ht 5' 8\" (1.727 m)   Wt 251 lb 12.8 oz (114.2 kg)   SpO2 95%   BMI 38.29 kg/m²     Assessment/Plan:   1. Type 2 diabetes mellitus without complication, with long-term current use of insulin (HCC)  -     POCT glycosylated hemoglobin (Hb A1C)  2. Essential hypertension  3. Hx of hepatitis C  4. Needs flu shot  -     Influenza, FLUAD, (age 72 y+), IM, PF, 0.5 mL      Type 2 diabetes-improving, POCT A1c today 6.8. Continue Farxiga 10 mg daily and Basaglar 10 units in the morning and 20 units at bedtime. Had to hold Ozempic due to high cost, plans to check again with pharmacy. Hypertension-controlled, continue metoprolol tartrate 25 mg twice daily    History of hepatitis C-needs to get hepatitis a and B vaccine, plans to get at next visit    No follow-ups on file.     Orders Placed This Encounter   Procedures    Influenza, FLUAD, (age 72 y+), IM, PF, 0.5 mL    POCT glycosylated hemoglobin (Hb A1C)     No orders of the defined types were placed in this encounter. Patient given educational materials - see patient instructions. Discussed use, benefit, and side effects of prescribed medications. All patientquestions answered. Pt voiced understanding. Reviewed health maintenance. Instructedto continue current medications, diet and exercise. Patient agreed with treatmentplan. Follow up as directed.      Electronically signed by Sandy Kaiser MD on 10/19/2022 at 11:48 AM

## 2022-10-28 ENCOUNTER — TELEPHONE (OUTPATIENT)
Dept: FAMILY MEDICINE CLINIC | Age: 67
End: 2022-10-28

## 2022-10-28 RX ORDER — INSULIN GLARGINE 100 [IU]/ML
INJECTION, SOLUTION SUBCUTANEOUS
Qty: 5 ADJUSTABLE DOSE PRE-FILLED PEN SYRINGE | Refills: 3 | Status: SHIPPED | OUTPATIENT
Start: 2022-10-28

## 2022-10-28 NOTE — TELEPHONE ENCOUNTER
Patient Rep is requesting that the members medication  Ozempic and Basaglar  be switched  since they are too expensive. She did request that Basaglar be switched to Lantus or toujeo and Ozempic be switched to something else if possible.

## 2022-10-31 DIAGNOSIS — E11.9 TYPE 2 DIABETES MELLITUS WITHOUT COMPLICATION, WITH LONG-TERM CURRENT USE OF INSULIN (HCC): Primary | ICD-10-CM

## 2022-10-31 DIAGNOSIS — Z79.4 TYPE 2 DIABETES MELLITUS WITHOUT COMPLICATION, WITH LONG-TERM CURRENT USE OF INSULIN (HCC): Primary | ICD-10-CM

## 2022-10-31 RX ORDER — DULAGLUTIDE 0.75 MG/.5ML
0.75 INJECTION, SOLUTION SUBCUTANEOUS WEEKLY
Qty: 4 ADJUSTABLE DOSE PRE-FILLED PEN SYRINGE | Refills: 1 | Status: SHIPPED | OUTPATIENT
Start: 2022-10-31

## 2022-12-12 RX ORDER — ATORVASTATIN CALCIUM 20 MG/1
TABLET, FILM COATED ORAL
Qty: 30 TABLET | Refills: 5 | Status: SHIPPED | OUTPATIENT
Start: 2022-12-12

## 2022-12-29 DIAGNOSIS — Z79.4 TYPE 2 DIABETES MELLITUS WITHOUT COMPLICATION, WITH LONG-TERM CURRENT USE OF INSULIN (HCC): ICD-10-CM

## 2022-12-29 DIAGNOSIS — E11.9 TYPE 2 DIABETES MELLITUS WITHOUT COMPLICATION, WITH LONG-TERM CURRENT USE OF INSULIN (HCC): ICD-10-CM

## 2022-12-29 RX ORDER — BLOOD-GLUCOSE METER
KIT MISCELLANEOUS
Qty: 100 EACH | Refills: 1 | Status: SHIPPED | OUTPATIENT
Start: 2022-12-29

## 2023-01-04 RX ORDER — PEN NEEDLE, DIABETIC 32GX 5/32"
NEEDLE, DISPOSABLE MISCELLANEOUS
Qty: 100 EACH | Refills: 11 | Status: SHIPPED | OUTPATIENT
Start: 2023-01-04

## 2023-01-04 NOTE — TELEPHONE ENCOUNTER
Shania Ponce is calling to request a refill on the following medication(s):    Medication Request:  Requested Prescriptions     Pending Prescriptions Disp Refills    BD PEN NEEDLE TERESITA 2ND GEN 32G X 4 MM MISC [Pharmacy Med Name: BD Pen Needle Teresita 2nd Gen Miscellaneous 32G X 4 MM] 100 each 11     Sig: USE WITH INSULIN FOUR TIMES DAILY       Last Visit Date (If Applicable):  20/70/1749    Next Visit Date:    1/20/2023

## 2023-01-17 ENCOUNTER — TELEPHONE (OUTPATIENT)
Dept: ONCOLOGY | Age: 68
End: 2023-01-17

## 2023-01-17 DIAGNOSIS — Z87.891 PERSONAL HISTORY OF NICOTINE DEPENDENCE: Primary | ICD-10-CM

## 2023-01-17 NOTE — TELEPHONE ENCOUNTER
Our records indicate that your patient is coming due for their annual lung cancer screening follow up testing. For your convenience, we have pended the order for the scan for you. If you do not agree with the need for the test, please cancel the order and let us know. Sincerely,    [address] [place] [address] Screening Program    Auto printed reminder letter sent to patient.

## 2023-01-19 DIAGNOSIS — I10 ESSENTIAL HYPERTENSION: ICD-10-CM

## 2023-01-19 NOTE — TELEPHONE ENCOUNTER
Char Cool is calling to request a refill on the following medication(s):    Medication Request:  Requested Prescriptions     Pending Prescriptions Disp Refills    metoprolol tartrate (LOPRESSOR) 25 MG tablet [Pharmacy Med Name: Metoprolol Tartrate Oral Tablet 25 MG] 60 tablet 0     Sig: TAKE 1 TABLET BY MOUTH TWO TIMES A DAY       Last Visit Date (If Applicable):  99/60/1227    Next Visit Date:    1/20/2023

## 2023-01-20 ENCOUNTER — OFFICE VISIT (OUTPATIENT)
Dept: FAMILY MEDICINE CLINIC | Age: 68
End: 2023-01-20
Payer: MEDICARE

## 2023-01-20 VITALS
HEIGHT: 68 IN | TEMPERATURE: 97 F | BODY MASS INDEX: 38.95 KG/M2 | SYSTOLIC BLOOD PRESSURE: 118 MMHG | WEIGHT: 257 LBS | DIASTOLIC BLOOD PRESSURE: 78 MMHG | OXYGEN SATURATION: 94 % | HEART RATE: 76 BPM

## 2023-01-20 DIAGNOSIS — Z79.4 TYPE 2 DIABETES MELLITUS WITHOUT COMPLICATION, WITH LONG-TERM CURRENT USE OF INSULIN (HCC): Primary | ICD-10-CM

## 2023-01-20 DIAGNOSIS — E11.9 TYPE 2 DIABETES MELLITUS WITHOUT COMPLICATION, WITH LONG-TERM CURRENT USE OF INSULIN (HCC): Primary | ICD-10-CM

## 2023-01-20 DIAGNOSIS — Z23 NEED FOR PROPHYLACTIC VACCINATION AND INOCULATION AGAINST VARICELLA: ICD-10-CM

## 2023-01-20 DIAGNOSIS — Z12.5 PROSTATE CANCER SCREENING: ICD-10-CM

## 2023-01-20 DIAGNOSIS — I10 ESSENTIAL HYPERTENSION: ICD-10-CM

## 2023-01-20 PROBLEM — R73.03 PREDIABETES: Status: RESOLVED | Noted: 2018-07-02 | Resolved: 2023-01-20

## 2023-01-20 LAB — HBA1C MFR BLD: 7.4 %

## 2023-01-20 PROCEDURE — 2022F DILAT RTA XM EVC RTNOPTHY: CPT | Performed by: STUDENT IN AN ORGANIZED HEALTH CARE EDUCATION/TRAINING PROGRAM

## 2023-01-20 PROCEDURE — 3051F HG A1C>EQUAL 7.0%<8.0%: CPT | Performed by: STUDENT IN AN ORGANIZED HEALTH CARE EDUCATION/TRAINING PROGRAM

## 2023-01-20 PROCEDURE — 3074F SYST BP LT 130 MM HG: CPT | Performed by: STUDENT IN AN ORGANIZED HEALTH CARE EDUCATION/TRAINING PROGRAM

## 2023-01-20 PROCEDURE — 3078F DIAST BP <80 MM HG: CPT | Performed by: STUDENT IN AN ORGANIZED HEALTH CARE EDUCATION/TRAINING PROGRAM

## 2023-01-20 PROCEDURE — 3017F COLORECTAL CA SCREEN DOC REV: CPT | Performed by: STUDENT IN AN ORGANIZED HEALTH CARE EDUCATION/TRAINING PROGRAM

## 2023-01-20 PROCEDURE — 1036F TOBACCO NON-USER: CPT | Performed by: STUDENT IN AN ORGANIZED HEALTH CARE EDUCATION/TRAINING PROGRAM

## 2023-01-20 PROCEDURE — G8484 FLU IMMUNIZE NO ADMIN: HCPCS | Performed by: STUDENT IN AN ORGANIZED HEALTH CARE EDUCATION/TRAINING PROGRAM

## 2023-01-20 PROCEDURE — G8417 CALC BMI ABV UP PARAM F/U: HCPCS | Performed by: STUDENT IN AN ORGANIZED HEALTH CARE EDUCATION/TRAINING PROGRAM

## 2023-01-20 PROCEDURE — G8427 DOCREV CUR MEDS BY ELIG CLIN: HCPCS | Performed by: STUDENT IN AN ORGANIZED HEALTH CARE EDUCATION/TRAINING PROGRAM

## 2023-01-20 PROCEDURE — 83036 HEMOGLOBIN GLYCOSYLATED A1C: CPT | Performed by: STUDENT IN AN ORGANIZED HEALTH CARE EDUCATION/TRAINING PROGRAM

## 2023-01-20 PROCEDURE — 99214 OFFICE O/P EST MOD 30 MIN: CPT | Performed by: STUDENT IN AN ORGANIZED HEALTH CARE EDUCATION/TRAINING PROGRAM

## 2023-01-20 PROCEDURE — 1123F ACP DISCUSS/DSCN MKR DOCD: CPT | Performed by: STUDENT IN AN ORGANIZED HEALTH CARE EDUCATION/TRAINING PROGRAM

## 2023-01-20 RX ORDER — INSULIN GLARGINE 100 [IU]/ML
20 INJECTION, SOLUTION SUBCUTANEOUS 2 TIMES DAILY
Qty: 5 ADJUSTABLE DOSE PRE-FILLED PEN SYRINGE | Refills: 1 | Status: SHIPPED | OUTPATIENT
Start: 2023-01-20

## 2023-01-20 ASSESSMENT — PATIENT HEALTH QUESTIONNAIRE - PHQ9
SUM OF ALL RESPONSES TO PHQ QUESTIONS 1-9: 0
SUM OF ALL RESPONSES TO PHQ9 QUESTIONS 1 & 2: 0
1. LITTLE INTEREST OR PLEASURE IN DOING THINGS: 0
SUM OF ALL RESPONSES TO PHQ QUESTIONS 1-9: 0
2. FEELING DOWN, DEPRESSED OR HOPELESS: 0

## 2023-01-20 ASSESSMENT — ENCOUNTER SYMPTOMS
COUGH: 0
ABDOMINAL PAIN: 0
EYE DISCHARGE: 0
VOMITING: 0
NAUSEA: 0
CONSTIPATION: 0
SORE THROAT: 0
SHORTNESS OF BREATH: 0
WHEEZING: 0
DIARRHEA: 0
CHEST TIGHTNESS: 0

## 2023-01-20 NOTE — PROGRESS NOTES
[address] PRIMARY CARE  [address] [place] [address]  Dept: [phone]  Dept Fax: [phone]    Davida Acosta is a 74 y.o. male who is a Established patient, who presents today for his medical conditions/complaints as noted below:  Chief Complaint   Patient presents with    Diabetes    Hypertension         HPI:     He is here today to follow-up on diabetes and hypertension. He was not able to get Trulicity from the pharmacy as it was very expensive. He has been taking the Raj Cook and Lantus regularly. He says that his blood sugars could be running high because of that not being compliant during holiday season. He says he is doing well on all his other labs. He has a history of hepatitis C with grade 1 fibrosis. He has not followed up with gastroenterology in a while, says he had no follow-up visit set up. He was advised to continue with monitoring with ultrasound, his last ultrasound was February 2022 and did not show any new changes. He has a positive Cologuard test but does not want to do colonoscopy. Hemoglobin A1C (%)   Date Value   01/20/2023 7.4   10/19/2022 6.8   06/09/2022 6.9             ( goal A1Cis < 7)   Microalb/Crt.  Ratio (mcg/mg creat)   Date Value   01/06/2022 34 (H)     LDL Cholesterol (mg/dL)   Date Value   01/06/2022 71   12/16/2020 132 (H)   11/15/2019 110       (goal LDL is <100)   AST (U/L)   Date Value   12/16/2020 29     ALT (U/L)   Date Value   12/16/2020 37     BUN (mg/dL)   Date Value   09/07/2022 16     BP Readings from Last 3 Encounters:   01/20/23 118/78   10/19/22 121/81   09/15/22 124/72          (goal 120/80)    Past Medical History:   Diagnosis Date    Hepatitis C     Hypertension     Liver disease     sees GI, treated for Hep C     Non-rheumatic mitral regurgitation     sees cardio,    Prediabetes 7/2/2018    Renal insufficiency 6/26/2018    Severe obesity (BMI 35.0-39.9) 9/11/2018    Ventricular tachycardia History reviewed. No pertinent surgical history. Family History   Problem Relation Age of Onset    Breast Cancer Mother     Cancer Mother     Coronary Art Dis Father         fatal MI 72       Social History     Tobacco Use    Smoking status: Former     Packs/day: 1.50     Years: 40.00     Pack years: 60.00     Types: Cigarettes     Start date: 1977     Quit date: 2015     Years since quittin.7    Smokeless tobacco: Never    Tobacco comments:     quit     Substance Use Topics    Alcohol use: Yes     Alcohol/week: 7.0 standard drinks     Types: 7 Cans of beer per week     Comment: 7 beers/w; ; prev drinking 3-4 X/wk, apprx 36 y      Current Outpatient Medications   Medication Sig Dispense Refill    zoster recombinant adjuvanted vaccine (SHINGRIX) 50 MCG/0.5ML SUSR injection Inject 0.5 mLs into the muscle once for 1 dose 50 MCG IM then repeat 2-6 months. 1 each 1    metoprolol tartrate (LOPRESSOR) 25 MG tablet Take 1 tablet by mouth 2 times daily 180 tablet 3    insulin glargine (LANTUS SOLOSTAR) 100 UNIT/ML injection pen Inject 20 Units into the skin 2 times daily Inject 20 units at night subcutaneously and 10 units in the morning 5 Adjustable Dose Pre-filled Pen Syringe 1    BD PEN NEEDLE TERESITA 2ND GEN 32G X 4 MM MISC USE WITH INSULIN FOUR TIMES DAILY 100 each 11    FREESTYLE LITE strip USE 1 TEST STRIP TO TEST BLOOD SUGAR 3 TIMES DAILY 100 each 1    atorvastatin (LIPITOR) 20 MG tablet TAKE 1 TABLET BY MOUTH EVERY DAY 30 tablet 5    FARXIGA 10 MG tablet TAKE 1 TABLET BY MOUTH IN THE MORNING 30 tablet 5    FreeStyle Lancets MISC use 1 LANCET to TEST BLOOD SUGAR three times a day 100 each 1     No current facility-administered medications for this visit.      No Known Allergies    Health Maintenance   Topic Date Due    Shingles vaccine (1 of 2) Never done    Lipids  2023    Diabetic foot exam  2023    Hepatitis A vaccine (1 of 2 - Risk 2-dose series) 2024 (Originally 3/26/1956) Hepatitis B vaccine (1 of 3 - Risk 3-dose series) 01/20/2024 (Originally 3/26/1974)    Low dose CT lung screening  02/08/2023    Diabetic retinal exam  06/23/2023    Annual Wellness Visit (AWV)  07/20/2023    Diabetic Alb to Cr ratio (uACR) test  09/07/2023    GFR test (Diabetes, CKD 3-4, OR last GFR 15-59)  09/07/2023    Colorectal Cancer Screen  12/08/2023    A1C test (Diabetic or Prediabetic)  01/20/2024    Depression Screen  01/20/2024    DTaP/Tdap/Td vaccine (2 - Td or Tdap) 07/06/2028    Flu vaccine  Completed    Pneumococcal 65+ years Vaccine  Completed    COVID-19 Vaccine  Completed    AAA screen  Completed    HIV screen  Completed    Hib vaccine  Aged Out    Meningococcal (ACWY) vaccine  Aged Out       Subjective:     Review of Systems   Constitutional:  Negative for appetite change, fatigue and fever. HENT:  Negative for congestion, ear pain, hearing loss and sore throat. Eyes:  Negative for discharge and visual disturbance. Respiratory:  Negative for cough, chest tightness, shortness of breath and wheezing. Cardiovascular:  Negative for chest pain, palpitations and leg swelling. Gastrointestinal:  Negative for abdominal pain, constipation, diarrhea, nausea and vomiting. Genitourinary:  Negative for flank pain, frequency, hematuria and urgency. Musculoskeletal:  Negative for arthralgias, gait problem, joint swelling and myalgias. Skin: Negative. Neurological:  Negative for dizziness, weakness, numbness and headaches. Psychiatric/Behavioral: Negative. Negative for dysphoric mood. The patient is not nervous/anxious. Objective:     Physical Exam  Vitals reviewed. Constitutional:       Appearance: Normal appearance. He is normal weight. HENT:      Head: Normocephalic and atraumatic.       Right Ear: Tympanic membrane normal.      Left Ear: Tympanic membrane normal.      Nose: Nose normal.      Mouth/Throat:      Mouth: Mucous membranes are moist.      Pharynx: Oropharynx is clear.   Eyes:      Extraocular Movements: Extraocular movements intact. Conjunctiva/sclera: Conjunctivae normal.      Pupils: Pupils are equal, round, and reactive to light. Cardiovascular:      Rate and Rhythm: Normal rate and regular rhythm. Heart sounds: Normal heart sounds. No murmur heard. No gallop. Pulmonary:      Effort: Pulmonary effort is normal. No respiratory distress. Breath sounds: Normal breath sounds. No stridor. No wheezing. Abdominal:      General: Bowel sounds are normal. There is no distension. Palpations: Abdomen is soft. Tenderness: There is no abdominal tenderness. There is no guarding or rebound. Musculoskeletal:         General: No swelling or tenderness. Normal range of motion. Cervical back: Normal range of motion and neck supple. Skin:     General: Skin is warm and dry. Coloration: Skin is not jaundiced. Findings: No rash. Neurological:      General: No focal deficit present. Mental Status: He is alert and oriented to person, place, and time. Psychiatric:         Mood and Affect: Mood normal.         Behavior: Behavior normal.         Thought Content: Thought content normal.         Judgment: Judgment normal.     /78   Pulse 76   Temp 97 °F (36.1 °C)   Ht 5' 8\" (1.727 m)   Wt 257 lb (116.6 kg)   SpO2 94%   BMI 39.08 kg/m²     Assessment/Plan:   1. Type 2 diabetes mellitus without complication, with long-term current use of insulin ([facility])  -     POCT glycosylated hemoglobin (Hb A1C)  -     Microalbumin, Ur; Future  -     insulin glargine (LANTUS SOLOSTAR) 100 UNIT/ML injection pen; Inject 20 Units into the skin 2 times daily Inject 20 units at night subcutaneously and 10 units in the morning, Disp-5 Adjustable Dose Pre-filled Pen Syringe, R-1Normal  2. Essential hypertension  -     metoprolol tartrate (LOPRESSOR) 25 MG tablet;  Take 1 tablet by mouth 2 times daily, Disp-180 tablet, R-3Normal  -     CBC with Auto Differential; Future  -     Comprehensive Metabolic Panel, Fasting; Future  -     Lipid, Fasting; Future  -     TSH with Reflex; Future  3. Prostate cancer screening  -     PSA Screening; Future  4. Need for prophylactic vaccination and inoculation against varicella  -     zoster recombinant adjuvanted vaccine [facility]) 50 MCG/0.5ML SUSR injection; Inject 0.5 mLs into the muscle once for 1 dose 50 MCG IM then repeat 2-6 months., Disp-1 each, R-1Print      Type 2 diabetes-POCT A1c today 7.4, continue Farxiga 10 mg daily and increased Lantus to 20 units twice daily    Hypertension-controlled, continue metoprolol tartrate 25 mg twice daily        Return in about 3 months (around 4/20/2023) for Follow up DM/HTN. Orders Placed This Encounter   Procedures    CBC with Auto Differential     Standing Status:   Future     Standing Expiration Date:   7/20/2023    Comprehensive Metabolic Panel, Fasting     Standing Status:   Future     Standing Expiration Date:   7/20/2023    Microalbumin, Ur     Standing Status:   Future     Standing Expiration Date:   4/20/2023    Lipid, Fasting     Standing Status:   Future     Standing Expiration Date:   7/20/2023    PSA Screening     Standing Status:   Future     Standing Expiration Date:   7/20/2023    TSH with Reflex     Standing Status:   Future     Standing Expiration Date:   7/20/2023    POCT glycosylated hemoglobin (Hb A1C)     Orders Placed This Encounter   Medications    zoster recombinant adjuvanted vaccine (SHINGRIX) 50 MCG/0.5ML SUSR injection     Sig: Inject 0.5 mLs into the muscle once for 1 dose 50 MCG IM then repeat 2-6 months.      Dispense:  1 each     Refill:  1    metoprolol tartrate (LOPRESSOR) 25 MG tablet     Sig: Take 1 tablet by mouth 2 times daily     Dispense:  180 tablet     Refill:  3    insulin glargine (LANTUS SOLOSTAR) 100 UNIT/ML injection pen     Sig: Inject 20 Units into the skin 2 times daily Inject 20 units at night subcutaneously and 10 units in the morning     Dispense:  5 Adjustable Dose Pre-filled Pen Syringe     Refill:  1       Patient given educational materials - see patient instructions. Discussed use, benefit, and side effects of prescribed medications. All patientquestions answered. Pt voiced understanding. Reviewed health maintenance. Instructedto continue current medications, diet and exercise. Patient agreed with treatmentplan. Follow up as directed.      Electronically signed by Flako Toussaint MD on 1/20/2023 at 1:29 PM

## 2023-01-26 ENCOUNTER — TELEPHONE (OUTPATIENT)
Dept: FAMILY MEDICINE CLINIC | Age: 68
End: 2023-01-26

## 2023-01-26 DIAGNOSIS — E11.9 TYPE 2 DIABETES MELLITUS WITHOUT COMPLICATION, WITH LONG-TERM CURRENT USE OF INSULIN (HCC): ICD-10-CM

## 2023-01-26 DIAGNOSIS — Z79.4 TYPE 2 DIABETES MELLITUS WITHOUT COMPLICATION, WITH LONG-TERM CURRENT USE OF INSULIN (HCC): ICD-10-CM

## 2023-01-26 RX ORDER — INSULIN GLARGINE 100 [IU]/ML
20 INJECTION, SOLUTION SUBCUTANEOUS 2 TIMES DAILY
Qty: 5 ADJUSTABLE DOSE PRE-FILLED PEN SYRINGE | Refills: 1 | Status: SHIPPED | OUTPATIENT
Start: 2023-01-26

## 2023-01-30 ENCOUNTER — TELEPHONE (OUTPATIENT)
Dept: FAMILY MEDICINE CLINIC | Age: 68
End: 2023-01-30

## 2023-01-30 NOTE — TELEPHONE ENCOUNTER
Please send in a new script for lantus  with directions and max number of units . Alejandro Sung     They have inject 20 units into the skin 2 times daily   Inject 20 units at night subcutaneously and 10 units in the morning

## 2023-02-08 NOTE — TELEPHONE ENCOUNTER
Do we have samples for Basaglar? Erivedge Counseling- I discussed with the patient the risks of Erivedge including but not limited to nausea, vomiting, diarrhea, constipation, weight loss, changes in the sense of taste, decreased appetite, muscle spasms, and hair loss.  The patient verbalized understanding of the proper use and possible adverse effects of Erivedge.  All of the patient's questions and concerns were addressed.

## 2023-02-09 DIAGNOSIS — D75.1 POLYCYTHEMIA: Primary | ICD-10-CM

## 2023-02-10 ENCOUNTER — HOSPITAL ENCOUNTER (OUTPATIENT)
Age: 68
Discharge: HOME | End: 2023-02-10
Payer: MEDICARE

## 2023-02-10 ENCOUNTER — OFFICE VISIT (OUTPATIENT)
Dept: ONCOLOGY | Age: 68
End: 2023-02-10
Payer: MEDICARE

## 2023-02-10 VITALS
TEMPERATURE: 97 F | BODY MASS INDEX: 39.03 KG/M2 | WEIGHT: 256.7 LBS | SYSTOLIC BLOOD PRESSURE: 131 MMHG | HEART RATE: 76 BPM | DIASTOLIC BLOOD PRESSURE: 83 MMHG

## 2023-02-10 DIAGNOSIS — D75.1 POLYCYTHEMIA: Primary | ICD-10-CM

## 2023-02-10 DIAGNOSIS — K70.30 ALCOHOLIC CIRRHOSIS OF LIVER WITHOUT ASCITES (HCC): ICD-10-CM

## 2023-02-10 DIAGNOSIS — D75.1 POLYCYTHEMIA: ICD-10-CM

## 2023-02-10 LAB
ABSOLUTE EOS #: 0.1 K/UL (ref 0–0.4)
ABSOLUTE LYMPH #: 2 K/UL (ref 1–4.8)
ABSOLUTE MONO #: 0.6 K/UL (ref 0.1–1.2)
BASOPHILS # BLD: 0 % (ref 0–2)
BASOPHILS ABSOLUTE: 0 K/UL (ref 0–0.2)
EOSINOPHILS RELATIVE PERCENT: 1 % (ref 1–4)
HCT VFR BLD AUTO: 52.7 % (ref 41–53)
HGB BLD-MCNC: 17.5 G/DL (ref 13.5–17.5)
LYMPHOCYTES # BLD: 32 % (ref 24–44)
MCH RBC QN AUTO: 30 PG (ref 26–34)
MCHC RBC AUTO-ENTMCNC: 33.3 G/DL (ref 31–37)
MCV RBC AUTO: 90.2 FL (ref 80–100)
MONOCYTES # BLD: 9 % (ref 2–11)
PDW BLD-RTO: 14.3 % (ref 12.5–15.4)
PLATELET # BLD AUTO: 181 K/UL (ref 140–450)
PMV BLD AUTO: 7.8 FL (ref 6–12)
RBC # BLD: 5.84 M/UL (ref 4.5–5.9)
SEG NEUTROPHILS: 58 % (ref 36–66)
SEGMENTED NEUTROPHILS ABSOLUTE COUNT: 3.5 K/UL (ref 1.8–7.7)
WBC # BLD AUTO: 6.2 K/UL (ref 3.5–11)

## 2023-02-10 PROCEDURE — G8484 FLU IMMUNIZE NO ADMIN: HCPCS | Performed by: INTERNAL MEDICINE

## 2023-02-10 PROCEDURE — 99211 OFF/OP EST MAY X REQ PHY/QHP: CPT | Performed by: INTERNAL MEDICINE

## 2023-02-10 PROCEDURE — 3075F SYST BP GE 130 - 139MM HG: CPT | Performed by: INTERNAL MEDICINE

## 2023-02-10 PROCEDURE — G8417 CALC BMI ABV UP PARAM F/U: HCPCS | Performed by: INTERNAL MEDICINE

## 2023-02-10 PROCEDURE — 3079F DIAST BP 80-89 MM HG: CPT | Performed by: INTERNAL MEDICINE

## 2023-02-10 PROCEDURE — 1036F TOBACCO NON-USER: CPT | Performed by: INTERNAL MEDICINE

## 2023-02-10 PROCEDURE — 85025 COMPLETE CBC W/AUTO DIFF WBC: CPT

## 2023-02-10 PROCEDURE — 99214 OFFICE O/P EST MOD 30 MIN: CPT | Performed by: INTERNAL MEDICINE

## 2023-02-10 PROCEDURE — 3017F COLORECTAL CA SCREEN DOC REV: CPT | Performed by: INTERNAL MEDICINE

## 2023-02-10 PROCEDURE — 1123F ACP DISCUSS/DSCN MKR DOCD: CPT | Performed by: INTERNAL MEDICINE

## 2023-02-10 PROCEDURE — G8427 DOCREV CUR MEDS BY ELIG CLIN: HCPCS | Performed by: INTERNAL MEDICINE

## 2023-02-10 PROCEDURE — 36415 COLL VENOUS BLD VENIPUNCTURE: CPT

## 2023-02-10 NOTE — PROGRESS NOTES
_           Chief Complaint   Patient presents with    Follow-up     Review status of disease    Discuss Labs     DIAGNOSIS:         Polycythemia, reactive polycythemia  History of tobacco abuse. Quit 7 years ago  Overweight  Probable sleep apnea  Hepatitis C  Mild chronic renal insufficiency  CURRENT THERAPY:         Observation monitoring of polycythemia    BRIEF CASE HISTORY:      Mr. Dudley Arevalo is a very pleasant 74 y.o. male with history of multiple co morbidities as listed. The patient is referred for evaluation and management of polycythemia. The patient was noted to have elevation of red blood cells over the last few lab tests. Patient denies any headaches or dizziness. No TIA or CVA-like symptoms. No chest pain or anginal symptoms. The patient had history of mild chronic renal insufficiency. He has history of diabetes and liver disease. He had history of hepatitis C on treatment. He is overweight. The patient managed to quit smoking 7 years ago. Used to smoke 1 pack/day for about 20 years. Denies alcohol drinking. .     INTERIM HISTORY:   Patient seen for follow-up polycythemia. He is clinically stable. No chest pain or anginal symptoms. No TIA or CVA-like symptoms. No headaches. No fever or infections. No other complaints. PAST MEDICAL HISTORY: has a past medical history of Hepatitis C, Hypertension, Liver disease, Non-rheumatic mitral regurgitation, Prediabetes, Renal insufficiency, Severe obesity (BMI 35.0-39.9), and Ventricular tachycardia. PAST SURGICAL HISTORY: has no past surgical history on file. CURRENT MEDICATIONS:  has a current medication list which includes the following prescription(s): lantus solostar, metoprolol tartrate, bd pen needle shruthi 2nd gen, freestyle lite, atorvastatin, farxiga, and freestyle lancets. ALLERGIES:  has No Known Allergies.     FAMILY HISTORY: Mother had breast cancer. Otherwise negative for any hematological or oncological conditions. SOCIAL HISTORY:  reports that he quit smoking about 7 years ago. His smoking use included cigarettes. He started smoking about 45 years ago. He has a 60.00 pack-year smoking history. He has never used smokeless tobacco. He reports current alcohol use of about 7.0 standard drinks per week. He reports that he does not use drugs. REVIEW OF SYSTEMS:     General: No weakness or fatigue. No unanticipated weight loss or decreased appetite. No fever or chills. Eyes: No blurred vision, eye pain or double vision. Ears: No hearing problems or drainage. No tinnitus. Throat: No sore throat, problems with swallowing or dysphagia. Respiratory: No cough, sputum or hemoptysis. No shortness of breath. No pleuritic chest pain. Cardiovascular: No chest pain, orthopnea or PND. No lower extremity edema. No palpitation. Gastrointestinal: No problems with swallowing. No abdominal pain or bloating. No nausea or vomiting. No diarrhea or constipation. No GI bleeding. Genitourinary: No dysuria, hematuria, frequency or urgency. Musculoskeletal: No muscle aches or pains. No limitation of movement. No back pain. No gait disturbance, No joint complaints. Dermatologic: No skin rashes or pruritus. No skin lesions or discolorations. Psychiatric: No depression, anxiety, or stress or signs of schizophrenia. No change in mood or affect. Hematologic: No history of bleeding tendency. No bruises or ecchymosis. No history of clotting problems. Infectious disease: No fever, chills or frequent infections. Endocrine: No polydipsia or polyuria. No temperature intolerance. Neurologic: No headaches or dizziness. No weakness or numbness of the extremities. No changes in balance, coordination,  memory, mentation, behavior. Allergic/Immunologic: No nasal congestion or hives. No repeated infections.        PHYSICAL EXAM:  The patient is not in acute distress. Vital signs: Blood pressure 131/83, pulse 76, temperature 97 °F (36.1 °C), temperature source Temporal, weight 256 lb 11.2 oz (116.4 kg). General appearance - well appearing, not in pain or distress  Mental status - good mood, alert and oriented  Eyes - pupils equal and reactive, extraocular eye movements intact  Ears - bilateral TM's and external ear canals normal  Nose - normal and patent, no erythema, discharge or polyps  Mouth - mucous membranes moist, pharynx normal without lesions  Neck - supple, no significant adenopathy  Lymphatics - no palpable lymphadenopathy, no hepatosplenomegaly  Chest - clear to auscultation, no wheezes, rales or rhonchi, symmetric air entry  Heart - normal rate, regular rhythm, normal S1, S2, no murmurs, rubs, clicks or gallops  Abdomen - soft, nontender, nondistended, no masses or organomegaly  Neurological - alert, oriented, normal speech, no focal findings or movement disorder noted  Musculoskeletal - no joint tenderness, deformity or swelling  Extremities - peripheral pulses normal, no pedal edema, no clubbing or cyanosis  Skin - normal coloration and turgor, no rashes, no suspicious skin lesions noted     Review of Diagnostic data:   Lab Results   Component Value Date    WBC 6.2 02/10/2023    HGB 17.5 02/10/2023    HCT 52.7 02/10/2023    MCV 90.2 02/10/2023     02/10/2023       Chemistry        Component Value Date/Time     09/07/2022 0850    K 4.3 09/07/2022 0850     09/07/2022 0850    CO2 19 (L) 09/07/2022 0850    BUN 16 09/07/2022 0850    CREATININE 1.08 09/07/2022 0850        Component Value Date/Time    CALCIUM 10.2 09/07/2022 0850    ALKPHOS 88 12/16/2020 0827    AST 29 12/16/2020 0827    ALT 37 12/16/2020 0827    BILITOT 0.67 12/16/2020 0827        JAK2 gene mutation normal  Erythropoietin level normal    IMPRESSION:   Polycythemia, reactive polycythemia  History of tobacco abuse.   Quit 7 years ago  Overweight  Probable sleep apnea  Hepatitis C  Mild chronic renal insufficiency    PLAN: I reviewed the labs as above and discussed with the patient. I explained to the patient the nature of this hematologic problem. I explained the significance of these abnormalities in layman language. Reviewing the patient's labs over the last several years, patient is having progressive increase in the red blood cells but it is not symptomatic and patient is not having any complications related to that problem. Further labs showed normal JAK2 gene mutation and normal erythropoietin level. Polycythemia vera is quite unlikely. I believe this is reactive resulting in secondary to patient's history of smoking as well as probably due to underlying sleep apnea. I recommended evaluation and testing for sleep apnea but patient declines. He already managed to quit smoking. I recommended maintaining good oxygenation and to try to control his weight. That may help controlling the probable sleep apnea. We will monitor labs and repeat CBC in 6 months. Patient's questions were answered to the best of his satisfaction and he verbalized full understanding and agreement.

## 2023-02-13 ENCOUNTER — TELEPHONE (OUTPATIENT)
Dept: ONCOLOGY | Age: 68
End: 2023-02-13

## 2023-02-13 NOTE — TELEPHONE ENCOUNTER
AVS from 2/10/23      RV 6 months with CBC at     Rv scheduled for 8/11 @ 10:00 am with labs at md visit    Pt was given AVS and appointment schedule    Electronically signed by Dameon Rodriguez on 2/13/2023 at 7:55 AM

## 2023-02-28 ENCOUNTER — HOSPITAL ENCOUNTER (OUTPATIENT)
Age: 68
Setting detail: SPECIMEN
Discharge: HOME | End: 2023-02-28

## 2023-02-28 DIAGNOSIS — N18.31 STAGE 3A CHRONIC KIDNEY DISEASE (HCC): ICD-10-CM

## 2023-02-28 LAB
25(OH)D3 SERPL-MCNC: 38.2 NG/ML
ABSOLUTE EOS #: 0.07 K/UL (ref 0–0.44)
ABSOLUTE IMMATURE GRANULOCYTE: 0.03 K/UL (ref 0–0.3)
ABSOLUTE LYMPH #: 2.05 K/UL (ref 1.1–3.7)
ABSOLUTE MONO #: 0.56 K/UL (ref 0.1–1.2)
ANION GAP SERPL CALCULATED.3IONS-SCNC: 16 MMOL/L (ref 9–17)
BASOPHILS # BLD: 1 % (ref 0–2)
BASOPHILS ABSOLUTE: 0.05 K/UL (ref 0–0.2)
BUN SERPL-MCNC: 16 MG/DL (ref 8–23)
CA-I BLD-SCNC: 1.41 MMOL/L (ref 1.13–1.33)
CALCIUM SERPL-MCNC: 10.5 MG/DL (ref 8.6–10.4)
CHLORIDE SERPL-SCNC: 105 MMOL/L (ref 98–107)
CO2 SERPL-SCNC: 18 MMOL/L (ref 20–31)
CREAT SERPL-MCNC: 1.07 MG/DL (ref 0.7–1.2)
CREATININE URINE: 87.9 MG/DL (ref 39–259)
EOSINOPHILS RELATIVE PERCENT: 1 % (ref 1–4)
GFR SERPL CREATININE-BSD FRML MDRD: >60 ML/MIN/1.73M2
GLUCOSE SERPL-MCNC: 207 MG/DL (ref 70–99)
HCT VFR BLD AUTO: 52.7 % (ref 40.7–50.3)
HGB BLD-MCNC: 18 G/DL (ref 13–17)
IMMATURE GRANULOCYTES: 1 %
LYMPHOCYTES # BLD: 32 % (ref 24–43)
MCH RBC QN AUTO: 30.6 PG (ref 25.2–33.5)
MCHC RBC AUTO-ENTMCNC: 34.2 G/DL (ref 28.4–34.8)
MCV RBC AUTO: 89.5 FL (ref 82.6–102.9)
MONOCYTES # BLD: 9 % (ref 3–12)
NRBC AUTOMATED: 0 PER 100 WBC
PDW BLD-RTO: 13.7 % (ref 11.8–14.4)
PHOSPHATE SERPL-MCNC: 3.1 MG/DL (ref 2.5–4.5)
PLATELET # BLD AUTO: 201 K/UL (ref 138–453)
PMV BLD AUTO: 10.3 FL (ref 8.1–13.5)
POTASSIUM SERPL-SCNC: 4.8 MMOL/L (ref 3.7–5.3)
PTH-INTACT SERPL-MCNC: 28.9 PG/ML (ref 14–72)
RBC # BLD: 5.89 M/UL (ref 4.21–5.77)
SEG NEUTROPHILS: 56 % (ref 36–65)
SEGMENTED NEUTROPHILS ABSOLUTE COUNT: 3.64 K/UL (ref 1.5–8.1)
SODIUM SERPL-SCNC: 139 MMOL/L (ref 135–144)
TOTAL PROTEIN, URINE: 19 MG/DL
URINE TOTAL PROTEIN CREATININE RATIO: 0.22 (ref 0–0.2)
WBC # BLD AUTO: 6.4 K/UL (ref 3.5–11.3)

## 2023-03-03 ENCOUNTER — HOSPITAL ENCOUNTER (OUTPATIENT)
Dept: CT IMAGING | Age: 68
End: 2023-03-03
Payer: MEDICARE

## 2023-03-03 DIAGNOSIS — Z87.891 PERSONAL HISTORY OF NICOTINE DEPENDENCE: ICD-10-CM

## 2023-03-03 PROCEDURE — 71271 CT THORAX LUNG CANCER SCR C-: CPT

## 2023-03-28 DIAGNOSIS — E11.9 TYPE 2 DIABETES MELLITUS WITHOUT COMPLICATION, WITH LONG-TERM CURRENT USE OF INSULIN (HCC): ICD-10-CM

## 2023-03-28 DIAGNOSIS — Z79.4 TYPE 2 DIABETES MELLITUS WITHOUT COMPLICATION, WITH LONG-TERM CURRENT USE OF INSULIN (HCC): ICD-10-CM

## 2023-03-28 RX ORDER — DAPAGLIFLOZIN 10 MG/1
TABLET, FILM COATED ORAL
Qty: 90 TABLET | Refills: 1 | Status: SHIPPED | OUTPATIENT
Start: 2023-03-28

## 2023-04-05 ENCOUNTER — HOSPITAL ENCOUNTER (OUTPATIENT)
Age: 68
Setting detail: SPECIMEN
Discharge: HOME | End: 2023-04-05

## 2023-04-05 DIAGNOSIS — E83.52 HYPERCALCEMIA: ICD-10-CM

## 2023-04-05 DIAGNOSIS — N18.31 STAGE 3A CHRONIC KIDNEY DISEASE (HCC): ICD-10-CM

## 2023-04-05 LAB
ANION GAP SERPL CALCULATED.3IONS-SCNC: 14 MMOL/L (ref 9–17)
BILIRUBIN URINE: NEGATIVE
BUN SERPL-MCNC: 16 MG/DL (ref 8–23)
CALCIUM SERPL-MCNC: 10.4 MG/DL (ref 8.6–10.4)
CALCIUM URINE: 15.3 MG/DL
CHLORIDE SERPL-SCNC: 101 MMOL/L (ref 98–107)
CO2 SERPL-SCNC: 19 MMOL/L (ref 20–31)
COLOR: YELLOW
CREAT SERPL-MCNC: 1.1 MG/DL (ref 0.7–1.2)
CREATININE URINE: 110.6 MG/DL (ref 39–259)
EPITHELIAL CELLS UA: ABNORMAL /HPF (ref 0–5)
GFR SERPL CREATININE-BSD FRML MDRD: >60 ML/MIN/1.73M2
GLUCOSE SERPL-MCNC: 137 MG/DL (ref 70–99)
GLUCOSE UR STRIP.AUTO-MCNC: ABNORMAL MG/DL
KETONES UR STRIP.AUTO-MCNC: NEGATIVE MG/DL
LEUKOCYTE ESTERASE UR QL STRIP.AUTO: NEGATIVE
NITRITE UR QL STRIP.AUTO: NEGATIVE
POTASSIUM SERPL-SCNC: 4.3 MMOL/L (ref 3.7–5.3)
PROT UR STRIP.AUTO-MCNC: 5.5 MG/DL (ref 5–8)
PROT UR STRIP.AUTO-MCNC: ABNORMAL MG/DL
RBC CLUMPS #/AREA URNS AUTO: ABNORMAL /HPF (ref 0–4)
SODIUM SERPL-SCNC: 134 MMOL/L (ref 135–144)
SPECIFIC GRAVITY UA: 1.03 (ref 1–1.03)
TURBIDITY: CLEAR
URINE HGB: NEGATIVE
UROBILINOGEN, URINE: NORMAL
WBC UA: ABNORMAL /HPF (ref 0–5)

## 2023-04-20 ENCOUNTER — OFFICE VISIT (OUTPATIENT)
Dept: FAMILY MEDICINE CLINIC | Age: 68
End: 2023-04-20
Payer: MEDICARE

## 2023-04-20 VITALS
HEART RATE: 77 BPM | DIASTOLIC BLOOD PRESSURE: 74 MMHG | HEIGHT: 68 IN | OXYGEN SATURATION: 95 % | SYSTOLIC BLOOD PRESSURE: 114 MMHG | BODY MASS INDEX: 38.71 KG/M2 | WEIGHT: 255.4 LBS | TEMPERATURE: 97.2 F

## 2023-04-20 DIAGNOSIS — Z79.4 TYPE 2 DIABETES MELLITUS WITHOUT COMPLICATION, WITH LONG-TERM CURRENT USE OF INSULIN (HCC): Primary | ICD-10-CM

## 2023-04-20 DIAGNOSIS — I10 ESSENTIAL HYPERTENSION: ICD-10-CM

## 2023-04-20 DIAGNOSIS — E78.00 PURE HYPERCHOLESTEROLEMIA: ICD-10-CM

## 2023-04-20 DIAGNOSIS — E11.9 TYPE 2 DIABETES MELLITUS WITHOUT COMPLICATION, WITH LONG-TERM CURRENT USE OF INSULIN (HCC): Primary | ICD-10-CM

## 2023-04-20 LAB — HBA1C MFR BLD: 6.8 %

## 2023-04-20 PROCEDURE — 3074F SYST BP LT 130 MM HG: CPT | Performed by: STUDENT IN AN ORGANIZED HEALTH CARE EDUCATION/TRAINING PROGRAM

## 2023-04-20 PROCEDURE — 3078F DIAST BP <80 MM HG: CPT | Performed by: STUDENT IN AN ORGANIZED HEALTH CARE EDUCATION/TRAINING PROGRAM

## 2023-04-20 PROCEDURE — G8417 CALC BMI ABV UP PARAM F/U: HCPCS | Performed by: STUDENT IN AN ORGANIZED HEALTH CARE EDUCATION/TRAINING PROGRAM

## 2023-04-20 PROCEDURE — 2022F DILAT RTA XM EVC RTNOPTHY: CPT | Performed by: STUDENT IN AN ORGANIZED HEALTH CARE EDUCATION/TRAINING PROGRAM

## 2023-04-20 PROCEDURE — 3017F COLORECTAL CA SCREEN DOC REV: CPT | Performed by: STUDENT IN AN ORGANIZED HEALTH CARE EDUCATION/TRAINING PROGRAM

## 2023-04-20 PROCEDURE — 1036F TOBACCO NON-USER: CPT | Performed by: STUDENT IN AN ORGANIZED HEALTH CARE EDUCATION/TRAINING PROGRAM

## 2023-04-20 PROCEDURE — 1123F ACP DISCUSS/DSCN MKR DOCD: CPT | Performed by: STUDENT IN AN ORGANIZED HEALTH CARE EDUCATION/TRAINING PROGRAM

## 2023-04-20 PROCEDURE — 83036 HEMOGLOBIN GLYCOSYLATED A1C: CPT | Performed by: STUDENT IN AN ORGANIZED HEALTH CARE EDUCATION/TRAINING PROGRAM

## 2023-04-20 PROCEDURE — 3044F HG A1C LEVEL LT 7.0%: CPT | Performed by: STUDENT IN AN ORGANIZED HEALTH CARE EDUCATION/TRAINING PROGRAM

## 2023-04-20 PROCEDURE — G8427 DOCREV CUR MEDS BY ELIG CLIN: HCPCS | Performed by: STUDENT IN AN ORGANIZED HEALTH CARE EDUCATION/TRAINING PROGRAM

## 2023-04-20 PROCEDURE — 99214 OFFICE O/P EST MOD 30 MIN: CPT | Performed by: STUDENT IN AN ORGANIZED HEALTH CARE EDUCATION/TRAINING PROGRAM

## 2023-04-20 SDOH — ECONOMIC STABILITY: HOUSING INSECURITY
IN THE LAST 12 MONTHS, WAS THERE A TIME WHEN YOU DID NOT HAVE A STEADY PLACE TO SLEEP OR SLEPT IN A SHELTER (INCLUDING NOW)?: NO

## 2023-04-20 SDOH — ECONOMIC STABILITY: FOOD INSECURITY: WITHIN THE PAST 12 MONTHS, YOU WORRIED THAT YOUR FOOD WOULD RUN OUT BEFORE YOU GOT MONEY TO BUY MORE.: NEVER TRUE

## 2023-04-20 SDOH — ECONOMIC STABILITY: INCOME INSECURITY: IN THE LAST 12 MONTHS, WAS THERE A TIME WHEN YOU WERE NOT ABLE TO PAY THE MORTGAGE OR RENT ON TIME?: NO

## 2023-04-20 SDOH — ECONOMIC STABILITY: FOOD INSECURITY: WITHIN THE PAST 12 MONTHS, THE FOOD YOU BOUGHT JUST DIDN'T LAST AND YOU DIDN'T HAVE MONEY TO GET MORE.: NEVER TRUE

## 2023-04-20 ASSESSMENT — SOCIAL DETERMINANTS OF HEALTH (SDOH): HOW HARD IS IT FOR YOU TO PAY FOR THE VERY BASICS LIKE FOOD, HOUSING, MEDICAL CARE, AND HEATING?: NOT HARD AT ALL

## 2023-04-20 NOTE — PROGRESS NOTES
History reviewed. No pertinent surgical history. Family History   Problem Relation Age of Onset    Breast Cancer Mother     Cancer Mother     Coronary Art Dis Father         fatal MI 72       Social History     Tobacco Use    Smoking status: Former     Packs/day: 1.50     Years: 40.00     Pack years: 60.00     Types: Cigarettes     Start date: 1977     Quit date: 2015     Years since quittin.0    Smokeless tobacco: Never    Tobacco comments:     quit     Substance Use Topics    Alcohol use: Yes     Alcohol/week: 7.0 standard drinks     Types: 7 Cans of beer per week     Comment: 7 beers/w; ; prev drinking 3-4 X/wk, apprx 40 y      Current Outpatient Medications   Medication Sig Dispense Refill    FARXIGA 10 MG tablet TAKE 1 TABLET BY MOUTH IN THE MORNING 90 tablet 1    insulin glargine (LANTUS SOLOSTAR) 100 UNIT/ML injection pen Inject 20 Units into the skin 2 times daily 5 Adjustable Dose Pre-filled Pen Syringe 1    metoprolol tartrate (LOPRESSOR) 25 MG tablet Take 1 tablet by mouth 2 times daily 180 tablet 3    BD PEN NEEDLE TERESITA 2ND GEN 32G X 4 MM MISC USE WITH INSULIN FOUR TIMES DAILY 100 each 11    FREESTYLE LITE strip USE 1 TEST STRIP TO TEST BLOOD SUGAR 3 TIMES DAILY 100 each 1    atorvastatin (LIPITOR) 20 MG tablet TAKE 1 TABLET BY MOUTH EVERY DAY 30 tablet 5    FreeStyle Lancets MISC use 1 LANCET to TEST BLOOD SUGAR three times a day 100 each 1     No current facility-administered medications for this visit.      No Known Allergies    Health Maintenance   Topic Date Due    Shingles vaccine (1 of 2) Never done    Diabetic foot exam  2023    Hepatitis A vaccine (1 of 2 - Risk 2-dose series) 2024 (Originally 3/26/1956)    Hepatitis B vaccine (1 of 3 - Risk 3-dose series) 2024 (Originally 3/26/1974)    Diabetic retinal exam  2023    Colorectal Cancer Screen  2023    Diabetic Alb to Cr ratio (uACR) test  2024    Low dose CT lung screening  2024

## 2023-04-23 ASSESSMENT — ENCOUNTER SYMPTOMS
VOMITING: 0
EYE DISCHARGE: 0
NAUSEA: 0
CONSTIPATION: 0
CHEST TIGHTNESS: 0
WHEEZING: 0
ABDOMINAL PAIN: 0
COUGH: 0
DIARRHEA: 0
SORE THROAT: 0
SHORTNESS OF BREATH: 0

## 2023-05-01 DIAGNOSIS — Z79.4 TYPE 2 DIABETES MELLITUS WITHOUT COMPLICATION, WITH LONG-TERM CURRENT USE OF INSULIN (HCC): ICD-10-CM

## 2023-05-01 DIAGNOSIS — E11.9 TYPE 2 DIABETES MELLITUS WITHOUT COMPLICATION, WITH LONG-TERM CURRENT USE OF INSULIN (HCC): ICD-10-CM

## 2023-05-02 RX ORDER — INSULIN GLARGINE 100 [IU]/ML
20 INJECTION, SOLUTION SUBCUTANEOUS 2 TIMES DAILY
Qty: 15 ML | Refills: 1 | Status: SHIPPED | OUTPATIENT
Start: 2023-05-02

## 2023-06-08 RX ORDER — ATORVASTATIN CALCIUM 20 MG/1
TABLET, FILM COATED ORAL
Qty: 90 TABLET | Refills: 1 | Status: SHIPPED | OUTPATIENT
Start: 2023-06-08

## 2023-06-08 NOTE — TELEPHONE ENCOUNTER
Lyndon Choi is calling to request a refill on the following medication(s):    Medication Request:  Requested Prescriptions     Pending Prescriptions Disp Refills    atorvastatin (LIPITOR) 20 MG tablet [Pharmacy Med Name: Atorvastatin Calcium Oral Tablet 20 MG] 30 tablet 0     Sig: TAKE 1 TABLET BY MOUTH EVERY DAY       Last Visit Date (If Applicable):  1/06/7928    Next Visit Date:    7/21/2023

## 2023-07-12 LAB
ABSOLUTE BASO #: 0.04 K/UL (ref 0–0.2)
ABSOLUTE EOS #: 0.06 K/UL (ref 0–0.5)
ABSOLUTE LYMPH #: 1.74 K/UL (ref 1–4)
ABSOLUTE MONO #: 0.47 K/UL (ref 0.2–1)
ABSOLUTE NEUT #: 2.92 K/UL (ref 1.5–7.5)
ANION GAP SERPL CALCULATED.3IONS-SCNC: 10 MEQ/L (ref 7–16)
BASOPHILS RELATIVE PERCENT: 0.8 %
BUN BLDV-MCNC: 17 MG/DL (ref 8–23)
CALCIUM SERPL-MCNC: 10.5 MG/DL (ref 8.5–10.5)
CHLORIDE BLD-SCNC: 104 MEQ/L (ref 95–107)
CO2: 26 MEQ/L (ref 19–31)
CREAT SERPL-MCNC: 1.18 MG/DL (ref 0.8–1.4)
CREATINE, URINE: 76 MG/DL
EGFR IF NONAFRICAN AMERICAN: 68 ML/MIN/1.73
EOSINOPHILS RELATIVE PERCENT: 1.1 %
GLUCOSE: 188 MG/DL (ref 70–99)
HCT VFR BLD CALC: 51.6 % (ref 40–51)
HEMOGLOBIN: 17.8 G/DL (ref 13.5–17)
LYMPHOCYTE %: 33.1 %
MCH RBC QN AUTO: 30.7 PG (ref 25–33)
MCHC RBC AUTO-ENTMCNC: 34.5 G/DL (ref 31–36)
MCV RBC AUTO: 89.1 FL (ref 80–99)
MONOCYTES # BLD: 9 %
NEUTROPHILS RELATIVE PERCENT: 55.6 %
PDW BLD-RTO: 13.8 % (ref 11.5–15)
PHOSPHORUS: 3.2 MG/DL (ref 2.5–4.5)
PLATELETS: 181 K/UL (ref 130–400)
PMV BLD AUTO: 10.2 FL (ref 9.3–13)
POTASSIUM SERPL-SCNC: 4.7 MEQ/L (ref 3.5–5.4)
PROTEIN, URINE: 12 MG/DL
PROTEIN/CREAT RATIO: 158 MG/G
RBC: 5.79 M/UL (ref 4.5–6.1)
SODIUM BLD-SCNC: 140 MEQ/L (ref 133–146)
VITAMIN D 25-HYDROXY: 35 NG/ML
WBC: 5.3 K/UL (ref 3.5–11)

## 2023-07-14 LAB
CALCIUM IONIZED SERUM: 5.42 MG/DL (ref 4.7–5.9)
PARATHYROID HORMONE INTACT: 32 PG/ML (ref 15–65)

## 2023-07-18 DIAGNOSIS — Z79.4 TYPE 2 DIABETES MELLITUS WITHOUT COMPLICATION, WITH LONG-TERM CURRENT USE OF INSULIN (HCC): ICD-10-CM

## 2023-07-18 DIAGNOSIS — E11.9 TYPE 2 DIABETES MELLITUS WITHOUT COMPLICATION, WITH LONG-TERM CURRENT USE OF INSULIN (HCC): ICD-10-CM

## 2023-07-18 RX ORDER — INSULIN GLARGINE 100 [IU]/ML
20 INJECTION, SOLUTION SUBCUTANEOUS 2 TIMES DAILY
Qty: 15 ML | Refills: 1 | Status: SHIPPED | OUTPATIENT
Start: 2023-07-18

## 2023-07-18 NOTE — TELEPHONE ENCOUNTER
Keyonna Colin is calling to request a refill on the following medication(s):    Medication Request:  Requested Prescriptions     Pending Prescriptions Disp Refills    LANTUS SOLOSTAR 100 UNIT/ML injection pen [Pharmacy Med Name: Lantus SoloStar Subcutaneous Solution Pen-injector 100 UNIT/ML] 15 mL 0     Sig: Inject 20 Units into the skin 2 times daily       Last Visit Date (If Applicable):  5/73/2698    Next Visit Date:    7/24/2023

## 2023-07-24 ENCOUNTER — OFFICE VISIT (OUTPATIENT)
Dept: FAMILY MEDICINE CLINIC | Age: 68
End: 2023-07-24
Payer: MEDICARE

## 2023-07-24 VITALS
HEART RATE: 74 BPM | SYSTOLIC BLOOD PRESSURE: 128 MMHG | DIASTOLIC BLOOD PRESSURE: 88 MMHG | HEIGHT: 68 IN | OXYGEN SATURATION: 99 % | BODY MASS INDEX: 38.55 KG/M2 | WEIGHT: 254.4 LBS

## 2023-07-24 DIAGNOSIS — Z79.4 TYPE 2 DIABETES MELLITUS WITHOUT COMPLICATION, WITH LONG-TERM CURRENT USE OF INSULIN (HCC): ICD-10-CM

## 2023-07-24 DIAGNOSIS — E21.3 HYPERPARATHYROIDISM (HCC): ICD-10-CM

## 2023-07-24 DIAGNOSIS — E11.9 TYPE 2 DIABETES MELLITUS WITHOUT COMPLICATION, WITH LONG-TERM CURRENT USE OF INSULIN (HCC): ICD-10-CM

## 2023-07-24 DIAGNOSIS — I50.32 CHRONIC DIASTOLIC HEART FAILURE (HCC): ICD-10-CM

## 2023-07-24 DIAGNOSIS — Z00.00 INITIAL MEDICARE ANNUAL WELLNESS VISIT: Primary | ICD-10-CM

## 2023-07-24 DIAGNOSIS — E66.01 SEVERE OBESITY (BMI 35.0-39.9) WITH COMORBIDITY (HCC): ICD-10-CM

## 2023-07-24 LAB — HBA1C MFR BLD: 6.9 %

## 2023-07-24 PROCEDURE — 3017F COLORECTAL CA SCREEN DOC REV: CPT | Performed by: STUDENT IN AN ORGANIZED HEALTH CARE EDUCATION/TRAINING PROGRAM

## 2023-07-24 PROCEDURE — 83037 HB GLYCOSYLATED A1C HOME DEV: CPT | Performed by: STUDENT IN AN ORGANIZED HEALTH CARE EDUCATION/TRAINING PROGRAM

## 2023-07-24 PROCEDURE — 1123F ACP DISCUSS/DSCN MKR DOCD: CPT | Performed by: STUDENT IN AN ORGANIZED HEALTH CARE EDUCATION/TRAINING PROGRAM

## 2023-07-24 PROCEDURE — G0438 PPPS, INITIAL VISIT: HCPCS | Performed by: STUDENT IN AN ORGANIZED HEALTH CARE EDUCATION/TRAINING PROGRAM

## 2023-07-24 PROCEDURE — 3044F HG A1C LEVEL LT 7.0%: CPT | Performed by: STUDENT IN AN ORGANIZED HEALTH CARE EDUCATION/TRAINING PROGRAM

## 2023-07-24 PROCEDURE — 3079F DIAST BP 80-89 MM HG: CPT | Performed by: STUDENT IN AN ORGANIZED HEALTH CARE EDUCATION/TRAINING PROGRAM

## 2023-07-24 PROCEDURE — 3074F SYST BP LT 130 MM HG: CPT | Performed by: STUDENT IN AN ORGANIZED HEALTH CARE EDUCATION/TRAINING PROGRAM

## 2023-07-24 RX ORDER — ORAL SEMAGLUTIDE 3 MG/1
3 TABLET ORAL DAILY
Qty: 30 TABLET | Refills: 1 | Status: SHIPPED | OUTPATIENT
Start: 2023-07-24

## 2023-07-24 SDOH — ECONOMIC STABILITY: FOOD INSECURITY: WITHIN THE PAST 12 MONTHS, YOU WORRIED THAT YOUR FOOD WOULD RUN OUT BEFORE YOU GOT MONEY TO BUY MORE.: NEVER TRUE

## 2023-07-24 SDOH — ECONOMIC STABILITY: FOOD INSECURITY: WITHIN THE PAST 12 MONTHS, THE FOOD YOU BOUGHT JUST DIDN'T LAST AND YOU DIDN'T HAVE MONEY TO GET MORE.: NEVER TRUE

## 2023-07-24 ASSESSMENT — SOCIAL DETERMINANTS OF HEALTH (SDOH): HOW HARD IS IT FOR YOU TO PAY FOR THE VERY BASICS LIKE FOOD, HOUSING, MEDICAL CARE, AND HEATING?: NOT HARD AT ALL

## 2023-07-24 ASSESSMENT — LIFESTYLE VARIABLES
HOW MANY STANDARD DRINKS CONTAINING ALCOHOL DO YOU HAVE ON A TYPICAL DAY: 1 OR 2
HOW OFTEN DO YOU HAVE A DRINK CONTAINING ALCOHOL: MONTHLY OR LESS

## 2023-07-24 ASSESSMENT — PATIENT HEALTH QUESTIONNAIRE - PHQ9
SUM OF ALL RESPONSES TO PHQ QUESTIONS 1-9: 0
SUM OF ALL RESPONSES TO PHQ QUESTIONS 1-9: 0
2. FEELING DOWN, DEPRESSED OR HOPELESS: 0
SUM OF ALL RESPONSES TO PHQ QUESTIONS 1-9: 0
SUM OF ALL RESPONSES TO PHQ9 QUESTIONS 1 & 2: 0
1. LITTLE INTEREST OR PLEASURE IN DOING THINGS: 0
SUM OF ALL RESPONSES TO PHQ QUESTIONS 1-9: 0

## 2023-07-24 NOTE — PROGRESS NOTES
Pranay So,    Attached are your test results.  Lupus screen is negative    Please contact us if you have any questions.    Selina [facility], CNP     CareTeam (Including outside providers/suppliers regularly involved in providing care):   Patient Care Team:  Zainab Lopez MD as PCP - General (Family Medicine)  Zainab Lopez MD as PCP - Empaneled Provider     Reviewed and updated this visit:  Tobacco  Allergies  Meds  Problems  Med Hx  Surg Hx  Soc Hx  Fam Hx

## 2023-08-21 DIAGNOSIS — D75.1 POLYCYTHEMIA: Primary | ICD-10-CM

## 2023-08-25 ENCOUNTER — TELEPHONE (OUTPATIENT)
Dept: ONCOLOGY | Age: 68
End: 2023-08-25

## 2023-08-25 ENCOUNTER — OFFICE VISIT (OUTPATIENT)
Dept: ONCOLOGY | Age: 68
End: 2023-08-25
Payer: MEDICARE

## 2023-08-25 ENCOUNTER — HOSPITAL ENCOUNTER (OUTPATIENT)
Age: 68
Setting detail: SPECIMEN
Discharge: HOME | End: 2023-08-25

## 2023-08-25 VITALS
WEIGHT: 256.4 LBS | TEMPERATURE: 96.9 F | OXYGEN SATURATION: 92 % | SYSTOLIC BLOOD PRESSURE: 121 MMHG | BODY MASS INDEX: 38.99 KG/M2 | DIASTOLIC BLOOD PRESSURE: 81 MMHG | HEART RATE: 82 BPM | RESPIRATION RATE: 18 BRPM

## 2023-08-25 DIAGNOSIS — D75.1 POLYCYTHEMIA: Primary | ICD-10-CM

## 2023-08-25 DIAGNOSIS — E21.3 HYPERPARATHYROIDISM (HCC): ICD-10-CM

## 2023-08-25 DIAGNOSIS — N18.31 STAGE 3A CHRONIC KIDNEY DISEASE (HCC): ICD-10-CM

## 2023-08-25 DIAGNOSIS — E83.52 HYPERCALCEMIA: ICD-10-CM

## 2023-08-25 LAB
ANION GAP SERPL CALCULATED.3IONS-SCNC: 17 MMOL/L (ref 9–17)
BUN SERPL-MCNC: 18 MG/DL (ref 8–23)
CA-I BLD-SCNC: 1.15 MMOL/L (ref 1.13–1.33)
CALCIUM SERPL-MCNC: 8.9 MG/DL (ref 8.6–10.4)
CHLORIDE SERPL-SCNC: 102 MMOL/L (ref 98–107)
CO2 SERPL-SCNC: 21 MMOL/L (ref 20–31)
CREAT SERPL-MCNC: 1.2 MG/DL (ref 0.7–1.2)
GFR SERPL CREATININE-BSD FRML MDRD: >60 ML/MIN/1.73M2
GLUCOSE SERPL-MCNC: 153 MG/DL (ref 70–99)
PHOSPHATE SERPL-MCNC: 4 MG/DL (ref 2.5–4.5)
POTASSIUM SERPL-SCNC: 4.7 MMOL/L (ref 3.7–5.3)
PTH-INTACT SERPL-MCNC: 15.3 PG/ML (ref 14–72)
SODIUM SERPL-SCNC: 140 MMOL/L (ref 135–144)

## 2023-08-25 PROCEDURE — 3074F SYST BP LT 130 MM HG: CPT | Performed by: INTERNAL MEDICINE

## 2023-08-25 PROCEDURE — 3017F COLORECTAL CA SCREEN DOC REV: CPT | Performed by: INTERNAL MEDICINE

## 2023-08-25 PROCEDURE — 1123F ACP DISCUSS/DSCN MKR DOCD: CPT | Performed by: INTERNAL MEDICINE

## 2023-08-25 PROCEDURE — G8417 CALC BMI ABV UP PARAM F/U: HCPCS | Performed by: INTERNAL MEDICINE

## 2023-08-25 PROCEDURE — 99214 OFFICE O/P EST MOD 30 MIN: CPT | Performed by: INTERNAL MEDICINE

## 2023-08-25 PROCEDURE — 99211 OFF/OP EST MAY X REQ PHY/QHP: CPT | Performed by: INTERNAL MEDICINE

## 2023-08-25 PROCEDURE — G8427 DOCREV CUR MEDS BY ELIG CLIN: HCPCS | Performed by: INTERNAL MEDICINE

## 2023-08-25 PROCEDURE — 1036F TOBACCO NON-USER: CPT | Performed by: INTERNAL MEDICINE

## 2023-08-25 PROCEDURE — 3079F DIAST BP 80-89 MM HG: CPT | Performed by: INTERNAL MEDICINE

## 2023-08-25 NOTE — PROGRESS NOTES
_           Chief Complaint   Patient presents with    Follow-up     6 month     DIAGNOSIS:         Polycythemia, reactive polycythemia  History of tobacco abuse. Quit 7 years ago  Overweight  Probable sleep apnea  Hepatitis C  Mild chronic renal insufficiency  CURRENT THERAPY:         Observation monitoring of polycythemia    BRIEF CASE HISTORY:      Mr. Charleen Castanon is a very pleasant 79 y.o. male with history of multiple co morbidities as listed. The patient is referred for evaluation and management of polycythemia. The patient was noted to have elevation of red blood cells over the last few lab tests. Patient denies any headaches or dizziness. No TIA or CVA-like symptoms. No chest pain or anginal symptoms. The patient had history of mild chronic renal insufficiency. He has history of diabetes and liver disease. He had history of hepatitis C on treatment. He is overweight. The patient managed to quit smoking 7 years ago. Used to smoke 1 pack/day for about 20 years. Denies alcohol drinking. .     INTERIM HISTORY:   Patient seen for follow-up polycythemia. He is clinically stable. No chest pain or anginal symptoms. No TIA or CVA-like symptoms. No headaches. No fever or infections. No other complaints. PAST MEDICAL HISTORY: has a past medical history of Hepatitis C, Hypertension, Liver disease, Non-rheumatic mitral regurgitation, Prediabetes, Renal insufficiency, Severe obesity (BMI 35.0-39.9), and Ventricular tachycardia ([address] PAST SURGICAL HISTORY: has no past surgical history on file. CURRENT MEDICATIONS:  has a current medication list which includes the following prescription(s): rybelsus, cinacalcet, lantus solostar, atorvastatin, farxiga, metoprolol tartrate, bd pen needle shruthi 2nd gen, freestyle lite, and freestyle lancets. ALLERGIES:  has No Known Allergies.     FAMILY HISTORY: Mother had

## 2023-08-25 NOTE — TELEPHONE ENCOUNTER
AVS from 8/25/23      RV 6 months with CBC at RV    *rv [email or web address]    PT was given AVS and appt schedule

## 2023-09-25 DIAGNOSIS — E11.9 TYPE 2 DIABETES MELLITUS WITHOUT COMPLICATION, WITH LONG-TERM CURRENT USE OF INSULIN (HCC): ICD-10-CM

## 2023-09-25 DIAGNOSIS — Z79.4 TYPE 2 DIABETES MELLITUS WITHOUT COMPLICATION, WITH LONG-TERM CURRENT USE OF INSULIN (HCC): ICD-10-CM

## 2023-09-26 RX ORDER — DAPAGLIFLOZIN 10 MG/1
TABLET, FILM COATED ORAL
Qty: 90 TABLET | Refills: 1 | Status: SHIPPED | OUTPATIENT
Start: 2023-09-26

## 2023-09-26 NOTE — TELEPHONE ENCOUNTER
Charan Frank is calling to request a refill on the following medication(s):    Medication Request:  Requested Prescriptions     Pending Prescriptions Disp Refills    FARXIGA 10 MG tablet [Pharmacy Med Name: Collette Jane Oral Tablet 10 MG] 90 tablet 0     Sig: TAKE 1 TABLET BY MOUTH IN THE MORNING       Last Visit Date (If Applicable):  1/85/1018    Next Visit Date:    10/24/2023

## 2023-09-27 ENCOUNTER — TELEPHONE (OUTPATIENT)
Dept: FAMILY MEDICINE CLINIC | Age: 68
End: 2023-09-27

## 2023-09-27 DIAGNOSIS — E11.9 TYPE 2 DIABETES MELLITUS WITHOUT COMPLICATION, WITH LONG-TERM CURRENT USE OF INSULIN (HCC): Primary | ICD-10-CM

## 2023-09-27 DIAGNOSIS — Z79.4 TYPE 2 DIABETES MELLITUS WITHOUT COMPLICATION, WITH LONG-TERM CURRENT USE OF INSULIN (HCC): Primary | ICD-10-CM

## 2023-09-27 RX ORDER — ORAL SEMAGLUTIDE 7 MG/1
1 TABLET ORAL DAILY
Qty: 30 TABLET | Refills: 1 | Status: SHIPPED | OUTPATIENT
Start: 2023-09-27

## 2023-10-02 DIAGNOSIS — Z79.4 TYPE 2 DIABETES MELLITUS WITHOUT COMPLICATION, WITH LONG-TERM CURRENT USE OF INSULIN (HCC): ICD-10-CM

## 2023-10-02 DIAGNOSIS — E11.9 TYPE 2 DIABETES MELLITUS WITHOUT COMPLICATION, WITH LONG-TERM CURRENT USE OF INSULIN (HCC): ICD-10-CM

## 2023-10-02 NOTE — TELEPHONE ENCOUNTER
Chalo Uriarte is calling to request a refill on the following medication(s):    Medication Request:  Requested Prescriptions     Pending Prescriptions Disp Refills    LANTUS SOLOSTAR 100 UNIT/ML injection pen [Pharmacy Med Name: Lantus SoloStar Subcutaneous Solution Pen-injector 100 UNIT/ML] 15 mL 0     Sig: Inject 20 Units into the skin 2 times daily       Last Visit Date (If Applicable):  2/64/4059    Next Visit Date:    10/24/2023

## 2023-10-03 RX ORDER — INSULIN GLARGINE 100 [IU]/ML
20 INJECTION, SOLUTION SUBCUTANEOUS 2 TIMES DAILY
Qty: 15 ML | Refills: 1 | Status: SHIPPED | OUTPATIENT
Start: 2023-10-03

## 2023-10-24 ENCOUNTER — OFFICE VISIT (OUTPATIENT)
Dept: FAMILY MEDICINE CLINIC | Age: 68
End: 2023-10-24
Payer: MEDICARE

## 2023-10-24 VITALS
HEIGHT: 68 IN | SYSTOLIC BLOOD PRESSURE: 124 MMHG | WEIGHT: 254 LBS | HEART RATE: 94 BPM | TEMPERATURE: 97 F | BODY MASS INDEX: 38.49 KG/M2 | OXYGEN SATURATION: 92 % | DIASTOLIC BLOOD PRESSURE: 70 MMHG

## 2023-10-24 DIAGNOSIS — N18.31 TYPE 2 DIABETES MELLITUS WITH STAGE 3A CHRONIC KIDNEY DISEASE, WITH LONG-TERM CURRENT USE OF INSULIN (HCC): ICD-10-CM

## 2023-10-24 DIAGNOSIS — I10 ESSENTIAL HYPERTENSION: Primary | ICD-10-CM

## 2023-10-24 DIAGNOSIS — Z23 IMMUNIZATION DUE: ICD-10-CM

## 2023-10-24 DIAGNOSIS — E11.22 TYPE 2 DIABETES MELLITUS WITH STAGE 3A CHRONIC KIDNEY DISEASE, WITH LONG-TERM CURRENT USE OF INSULIN (HCC): ICD-10-CM

## 2023-10-24 DIAGNOSIS — K02.9 DENTAL CAVITY: ICD-10-CM

## 2023-10-24 DIAGNOSIS — Z79.4 TYPE 2 DIABETES MELLITUS WITH STAGE 3A CHRONIC KIDNEY DISEASE, WITH LONG-TERM CURRENT USE OF INSULIN (HCC): ICD-10-CM

## 2023-10-24 LAB — HBA1C MFR BLD: 6.5 %

## 2023-10-24 PROCEDURE — 90694 VACC AIIV4 NO PRSRV 0.5ML IM: CPT | Performed by: STUDENT IN AN ORGANIZED HEALTH CARE EDUCATION/TRAINING PROGRAM

## 2023-10-24 PROCEDURE — 3044F HG A1C LEVEL LT 7.0%: CPT | Performed by: STUDENT IN AN ORGANIZED HEALTH CARE EDUCATION/TRAINING PROGRAM

## 2023-10-24 PROCEDURE — 3017F COLORECTAL CA SCREEN DOC REV: CPT | Performed by: STUDENT IN AN ORGANIZED HEALTH CARE EDUCATION/TRAINING PROGRAM

## 2023-10-24 PROCEDURE — 3078F DIAST BP <80 MM HG: CPT | Performed by: STUDENT IN AN ORGANIZED HEALTH CARE EDUCATION/TRAINING PROGRAM

## 2023-10-24 PROCEDURE — G0009 ADMIN PNEUMOCOCCAL VACCINE: HCPCS | Performed by: STUDENT IN AN ORGANIZED HEALTH CARE EDUCATION/TRAINING PROGRAM

## 2023-10-24 PROCEDURE — 90677 PCV20 VACCINE IM: CPT | Performed by: STUDENT IN AN ORGANIZED HEALTH CARE EDUCATION/TRAINING PROGRAM

## 2023-10-24 PROCEDURE — G8484 FLU IMMUNIZE NO ADMIN: HCPCS | Performed by: STUDENT IN AN ORGANIZED HEALTH CARE EDUCATION/TRAINING PROGRAM

## 2023-10-24 PROCEDURE — G0008 ADMIN INFLUENZA VIRUS VAC: HCPCS | Performed by: STUDENT IN AN ORGANIZED HEALTH CARE EDUCATION/TRAINING PROGRAM

## 2023-10-24 PROCEDURE — 2022F DILAT RTA XM EVC RTNOPTHY: CPT | Performed by: STUDENT IN AN ORGANIZED HEALTH CARE EDUCATION/TRAINING PROGRAM

## 2023-10-24 PROCEDURE — 83036 HEMOGLOBIN GLYCOSYLATED A1C: CPT | Performed by: STUDENT IN AN ORGANIZED HEALTH CARE EDUCATION/TRAINING PROGRAM

## 2023-10-24 PROCEDURE — G8427 DOCREV CUR MEDS BY ELIG CLIN: HCPCS | Performed by: STUDENT IN AN ORGANIZED HEALTH CARE EDUCATION/TRAINING PROGRAM

## 2023-10-24 PROCEDURE — 1036F TOBACCO NON-USER: CPT | Performed by: STUDENT IN AN ORGANIZED HEALTH CARE EDUCATION/TRAINING PROGRAM

## 2023-10-24 PROCEDURE — 1123F ACP DISCUSS/DSCN MKR DOCD: CPT | Performed by: STUDENT IN AN ORGANIZED HEALTH CARE EDUCATION/TRAINING PROGRAM

## 2023-10-24 PROCEDURE — G8417 CALC BMI ABV UP PARAM F/U: HCPCS | Performed by: STUDENT IN AN ORGANIZED HEALTH CARE EDUCATION/TRAINING PROGRAM

## 2023-10-24 PROCEDURE — 3074F SYST BP LT 130 MM HG: CPT | Performed by: STUDENT IN AN ORGANIZED HEALTH CARE EDUCATION/TRAINING PROGRAM

## 2023-10-24 PROCEDURE — 99214 OFFICE O/P EST MOD 30 MIN: CPT | Performed by: STUDENT IN AN ORGANIZED HEALTH CARE EDUCATION/TRAINING PROGRAM

## 2023-10-24 RX ORDER — ORAL SEMAGLUTIDE 14 MG/1
1 TABLET ORAL
Qty: 30 TABLET | Refills: 3 | Status: SHIPPED | OUTPATIENT
Start: 2023-10-24

## 2023-10-24 RX ORDER — CHLORHEXIDINE GLUCONATE ORAL RINSE 1.2 MG/ML
15 SOLUTION DENTAL 2 TIMES DAILY
Qty: 420 ML | Refills: 0 | Status: SHIPPED | OUTPATIENT
Start: 2023-10-24 | End: 2023-11-07

## 2023-10-24 SDOH — ECONOMIC STABILITY: FOOD INSECURITY: WITHIN THE PAST 12 MONTHS, YOU WORRIED THAT YOUR FOOD WOULD RUN OUT BEFORE YOU GOT MONEY TO BUY MORE.: NEVER TRUE

## 2023-10-24 SDOH — ECONOMIC STABILITY: INCOME INSECURITY: IN THE LAST 12 MONTHS, WAS THERE A TIME WHEN YOU WERE NOT ABLE TO PAY THE MORTGAGE OR RENT ON TIME?: NO

## 2023-10-24 SDOH — ECONOMIC STABILITY: FOOD INSECURITY: WITHIN THE PAST 12 MONTHS, THE FOOD YOU BOUGHT JUST DIDN'T LAST AND YOU DIDN'T HAVE MONEY TO GET MORE.: NEVER TRUE

## 2023-10-24 ASSESSMENT — PATIENT HEALTH QUESTIONNAIRE - PHQ9
SUM OF ALL RESPONSES TO PHQ QUESTIONS 1-9: 0
1. LITTLE INTEREST OR PLEASURE IN DOING THINGS: 0
SUM OF ALL RESPONSES TO PHQ QUESTIONS 1-9: 0
SUM OF ALL RESPONSES TO PHQ9 QUESTIONS 1 & 2: 0
SUM OF ALL RESPONSES TO PHQ QUESTIONS 1-9: 0
2. FEELING DOWN, DEPRESSED OR HOPELESS: 0
SUM OF ALL RESPONSES TO PHQ QUESTIONS 1-9: 0

## 2023-10-24 ASSESSMENT — SOCIAL DETERMINANTS OF HEALTH (SDOH): HOW HARD IS IT FOR YOU TO PAY FOR THE VERY BASICS LIKE FOOD, HOUSING, MEDICAL CARE, AND HEATING?: NOT HARD AT ALL

## 2023-10-24 NOTE — PROGRESS NOTES
(age 72 y+), IM, Preservative Free, 0.5 mL      Hypertension-controlled, on metoprolol tartrate 25 mg twice daily    Type 2 diabetes-controlled, POCT A1c today 6.5, on Farxiga 10 mg daily, increased Rybelsus to 14 mg daily, discussed decreasing Lantus to 15 units twice daily and eventually down to 10 units twice daily        Return in about 3 months (around 1/24/2024) for Follow up DM/HTN. Orders Placed This Encounter   Procedures    Pneumococcal, PCV20, PREVNAR 20, (age 6w+), IM, PF    Influenza, FLUAD, (age 72 y+), IM, Preservative Free, 0.5 mL    POCT glycosylated hemoglobin (Hb A1C)     Orders Placed This Encounter   Medications    Semaglutide (RYBELSUS) 14 MG TABS     Sig: Take 1 tablet by mouth every morning (before breakfast)     Dispense:  30 tablet     Refill:  3    chlorhexidine (PERIDEX) 0.12 % solution     Sig: Swish and spit 15 mLs 2 times daily for 14 days     Dispense:  420 mL     Refill:  0       Patient given educational materials - see patient instructions. Discussed use, benefit, and side effects of prescribed medications. All patientquestions answered. Pt voiced understanding. Reviewed health maintenance. Instructedto continue current medications, diet and exercise. Patient agreed with treatmentplan. Follow up as directed.      Electronically signed by Abigail Herrera MD on 10/25/2023 at 1:07 PM

## 2023-10-25 ASSESSMENT — ENCOUNTER SYMPTOMS
NAUSEA: 0
CHEST TIGHTNESS: 0
ABDOMINAL PAIN: 0
VOMITING: 0
COUGH: 0
DIARRHEA: 0
WHEEZING: 0
CONSTIPATION: 0
SORE THROAT: 0
SHORTNESS OF BREATH: 0
EYE DISCHARGE: 0

## 2023-11-22 ENCOUNTER — HOSPITAL ENCOUNTER (OUTPATIENT)
Age: 68
Setting detail: SPECIMEN
Discharge: HOME | End: 2023-11-22

## 2023-11-22 DIAGNOSIS — N18.31 STAGE 3A CHRONIC KIDNEY DISEASE (HCC): ICD-10-CM

## 2023-11-22 LAB
25(OH)D3 SERPL-MCNC: 31.2 NG/ML (ref 30–100)
ANION GAP SERPL CALCULATED.3IONS-SCNC: 12 MMOL/L (ref 9–16)
BASOPHILS # BLD: 0.05 K/UL (ref 0–0.2)
BASOPHILS NFR BLD: 1 % (ref 0–2)
BUN SERPL-MCNC: 16 MG/DL (ref 8–23)
CA-I BLD-SCNC: 1.11 MMOL/L (ref 1.13–1.33)
CALCIUM SERPL-MCNC: 8.7 MG/DL (ref 8.6–10.4)
CHLORIDE SERPL-SCNC: 101 MMOL/L (ref 98–107)
CO2 SERPL-SCNC: 23 MMOL/L (ref 20–31)
CREAT SERPL-MCNC: 1.1 MG/DL (ref 0.7–1.2)
CREAT UR-MCNC: 97.2 MG/DL (ref 39–259)
EOSINOPHIL # BLD: 0.07 K/UL (ref 0–0.44)
EOSINOPHILS RELATIVE PERCENT: 1 % (ref 1–4)
ERYTHROCYTE [DISTWIDTH] IN BLOOD BY AUTOMATED COUNT: 13.3 % (ref 11.8–14.4)
GFR SERPL CREATININE-BSD FRML MDRD: >60 ML/MIN/1.73M2
GLUCOSE SERPL-MCNC: 133 MG/DL (ref 74–99)
HCT VFR BLD AUTO: 53.2 % (ref 40.7–50.3)
HGB BLD-MCNC: 17.6 G/DL (ref 13–17)
IMM GRANULOCYTES # BLD AUTO: <0.03 K/UL (ref 0–0.3)
IMM GRANULOCYTES NFR BLD: 0 %
LYMPHOCYTES NFR BLD: 1.91 K/UL (ref 1.1–3.7)
LYMPHOCYTES RELATIVE PERCENT: 27 % (ref 24–43)
MCH RBC QN AUTO: 29.7 PG (ref 25.2–33.5)
MCHC RBC AUTO-ENTMCNC: 33.1 G/DL (ref 28.4–34.8)
MCV RBC AUTO: 89.9 FL (ref 82.6–102.9)
MONOCYTES NFR BLD: 0.69 K/UL (ref 0.1–1.2)
MONOCYTES NFR BLD: 10 % (ref 3–12)
NEUTROPHILS NFR BLD: 61 % (ref 36–65)
NEUTS SEG NFR BLD: 4.44 K/UL (ref 1.5–8.1)
NRBC BLD-RTO: 0 PER 100 WBC
PHOSPHATE SERPL-MCNC: 3.9 MG/DL (ref 2.5–4.5)
PLATELET # BLD AUTO: 225 K/UL (ref 138–453)
PMV BLD AUTO: 10.1 FL (ref 8.1–13.5)
POTASSIUM SERPL-SCNC: 4.7 MMOL/L (ref 3.7–5.3)
PTH-INTACT SERPL-MCNC: 25.1 PG/ML (ref 14–72)
RBC # BLD AUTO: 5.92 M/UL (ref 4.21–5.77)
SODIUM SERPL-SCNC: 136 MMOL/L (ref 136–145)
TOTAL PROTEIN, URINE: 18 MG/DL
URINE TOTAL PROTEIN CREATININE RATIO: 0.19 (ref 0–0.2)
WBC OTHER # BLD: 7.2 K/UL (ref 3.5–11.3)

## 2023-12-04 NOTE — TELEPHONE ENCOUNTER
Lyndon Choi is calling to request a refill on the following medication(s):    Medication Request:  Requested Prescriptions     Pending Prescriptions Disp Refills    atorvastatin (LIPITOR) 20 MG tablet [Pharmacy Med Name: Atorvastatin Calcium Oral Tablet 20 MG] 90 tablet 0     Sig: TAKE 1 TABLET BY MOUTH EVERY DAY       Last Visit Date (If Applicable):  08/83/8127    Next Visit Date:    1/29/2024

## 2023-12-05 RX ORDER — ATORVASTATIN CALCIUM 20 MG/1
TABLET, FILM COATED ORAL
Qty: 90 TABLET | Refills: 1 | Status: SHIPPED | OUTPATIENT
Start: 2023-12-05

## 2024-01-10 DIAGNOSIS — E11.9 TYPE 2 DIABETES MELLITUS WITHOUT COMPLICATION, WITH LONG-TERM CURRENT USE OF INSULIN (HCC): ICD-10-CM

## 2024-01-10 DIAGNOSIS — Z79.4 TYPE 2 DIABETES MELLITUS WITHOUT COMPLICATION, WITH LONG-TERM CURRENT USE OF INSULIN (HCC): ICD-10-CM

## 2024-01-10 NOTE — TELEPHONE ENCOUNTER
Buck Sloan is calling to request a refill on the following medication(s):    Medication Request:  Requested Prescriptions     Pending Prescriptions Disp Refills    LANTUS SOLOSTAR 100 UNIT/ML injection pen [Pharmacy Med Name: Lantus SoloStar Subcutaneous Solution Pen-injector 100 UNIT/ML] 15 mL 0     Sig: Inject 20 Units into the skin 2 times daily       Last Visit Date (If Applicable):  10/24/2023    Next Visit Date:    1/29/2024

## 2024-01-11 RX ORDER — INSULIN GLARGINE 100 [IU]/ML
20 INJECTION, SOLUTION SUBCUTANEOUS 2 TIMES DAILY
Qty: 15 ML | Refills: 1 | Status: SHIPPED | OUTPATIENT
Start: 2024-01-11

## 2024-01-18 ENCOUNTER — HOSPITAL ENCOUNTER (OUTPATIENT)
Age: 69
Setting detail: SPECIMEN
Discharge: HOME | End: 2024-01-18

## 2024-01-18 DIAGNOSIS — N18.31 STAGE 3A CHRONIC KIDNEY DISEASE (HCC): ICD-10-CM

## 2024-01-18 LAB
25(OH)D3 SERPL-MCNC: 31.6 NG/ML
25(OH)D3 SERPL-MCNC: 31.6 NG/ML
ALBUMIN SERPL-MCNC: 4.3 G/DL (ref 3.5–5.2)
ALBUMIN/GLOB SERPL: 1.6 {RATIO} (ref 1–2.5)
ALP SERPL-CCNC: 101 U/L (ref 40–129)
ALT SERPL-CCNC: 38 U/L (ref 5–41)
ANION GAP SERPL CALCULATED.3IONS-SCNC: 12 MMOL/L (ref 9–17)
ANION GAP SERPL CALCULATED.3IONS-SCNC: 12 MMOL/L (ref 9–17)
AST SERPL-CCNC: 27 U/L
BASOPHILS # BLD: 0.06 K/UL (ref 0–0.2)
BASOPHILS NFR BLD: 1 % (ref 0–2)
BILIRUB SERPL-MCNC: 1.1 MG/DL (ref 0.3–1.2)
BUN SERPL-MCNC: 16 MG/DL (ref 8–23)
BUN SERPL-MCNC: 16 MG/DL (ref 8–23)
CA-I BLD-SCNC: 1.22 MMOL/L (ref 1.13–1.33)
CA-I BLD-SCNC: 1.22 MMOL/L (ref 1.13–1.33)
CALCIUM SERPL-MCNC: 8.9 MG/DL (ref 8.6–10.4)
CALCIUM SERPL-MCNC: 8.9 MG/DL (ref 8.6–10.4)
CHLORIDE SERPL-SCNC: 100 MMOL/L (ref 98–107)
CHLORIDE SERPL-SCNC: 100 MMOL/L (ref 98–107)
CO2 SERPL-SCNC: 22 MMOL/L (ref 20–31)
CO2 SERPL-SCNC: 22 MMOL/L (ref 20–31)
CREAT SERPL-MCNC: 1 MG/DL (ref 0.7–1.2)
CREAT SERPL-MCNC: 1 MG/DL (ref 0.7–1.2)
CREAT UR-MCNC: 79.3 MG/DL (ref 39–259)
EOSINOPHIL # BLD: 0.05 K/UL (ref 0–0.44)
EOSINOPHILS RELATIVE PERCENT: 1 % (ref 1–4)
ERYTHROCYTE [DISTWIDTH] IN BLOOD BY AUTOMATED COUNT: 13.7 % (ref 11.8–14.4)
GFR SERPL CREATININE-BSD FRML MDRD: >60 ML/MIN/1.73M2
GFR SERPL CREATININE-BSD FRML MDRD: >60 ML/MIN/1.73M2
GLUCOSE SERPL-MCNC: 156 MG/DL (ref 70–99)
GLUCOSE SERPL-MCNC: 156 MG/DL (ref 70–99)
HCT VFR BLD AUTO: 51.8 % (ref 40.7–50.3)
HGB BLD-MCNC: 17.6 G/DL (ref 13–17)
IMM GRANULOCYTES # BLD AUTO: 0.03 K/UL (ref 0–0.3)
IMM GRANULOCYTES NFR BLD: 0 %
LYMPHOCYTES NFR BLD: 2.01 K/UL (ref 1.1–3.7)
LYMPHOCYTES RELATIVE PERCENT: 29 % (ref 24–43)
MCH RBC QN AUTO: 30.2 PG (ref 25.2–33.5)
MCHC RBC AUTO-ENTMCNC: 34 G/DL (ref 28.4–34.8)
MCV RBC AUTO: 89 FL (ref 82.6–102.9)
MONOCYTES NFR BLD: 0.71 K/UL (ref 0.1–1.2)
MONOCYTES NFR BLD: 10 % (ref 3–12)
NEUTROPHILS NFR BLD: 59 % (ref 36–65)
NEUTS SEG NFR BLD: 4.16 K/UL (ref 1.5–8.1)
NRBC BLD-RTO: 0 PER 100 WBC
PHOSPHATE SERPL-MCNC: 3.3 MG/DL (ref 2.5–4.5)
PLATELET # BLD AUTO: 209 K/UL (ref 138–453)
PMV BLD AUTO: 9.9 FL (ref 8.1–13.5)
POTASSIUM SERPL-SCNC: 4.7 MMOL/L (ref 3.7–5.3)
POTASSIUM SERPL-SCNC: 4.7 MMOL/L (ref 3.7–5.3)
PROT SERPL-MCNC: 7 G/DL (ref 6.4–8.3)
PTH-INTACT SERPL-MCNC: 25 PG/ML (ref 14–72)
PTH-INTACT SERPL-MCNC: 25 PG/ML (ref 14–72)
RBC # BLD AUTO: 5.82 M/UL (ref 4.21–5.77)
SODIUM SERPL-SCNC: 134 MMOL/L (ref 135–144)
SODIUM SERPL-SCNC: 134 MMOL/L (ref 135–144)
T4 FREE SERPL-MCNC: 1.1 NG/DL (ref 0.9–1.7)
TOTAL PROTEIN, URINE: 14 MG/DL
TSH SERPL DL<=0.05 MIU/L-ACNC: 1.68 UIU/ML (ref 0.3–5)
URINE TOTAL PROTEIN CREATININE RATIO: 0.18
WBC OTHER # BLD: 7 K/UL (ref 3.5–11.3)

## 2024-01-19 LAB
CALCIUM UR-MCNC: 18.7 MG/DL (ref 100–300)
CALCIUM, URINE: 318 MG/24 H (ref 25–300)
COLLECT DURATION TIME SPEC: 24 H
COLLECT DURATION TIME SPEC: 24 H
CREATINE 24H UR-MRATE: 1340 MG/24 H (ref 1040–2350)
CREATININE URINE: 78.8 MG/DL
SPECIMEN VOL UR: 1700 ML
SPECIMEN VOL UR: 1700 ML

## 2024-01-29 ENCOUNTER — OFFICE VISIT (OUTPATIENT)
Dept: FAMILY MEDICINE CLINIC | Age: 69
End: 2024-01-29
Payer: MEDICARE

## 2024-01-29 VITALS
WEIGHT: 253 LBS | OXYGEN SATURATION: 93 % | HEIGHT: 68 IN | TEMPERATURE: 97 F | BODY MASS INDEX: 38.34 KG/M2 | DIASTOLIC BLOOD PRESSURE: 78 MMHG | SYSTOLIC BLOOD PRESSURE: 108 MMHG | HEART RATE: 75 BPM

## 2024-01-29 DIAGNOSIS — Z79.4 TYPE 2 DIABETES MELLITUS WITH STAGE 3A CHRONIC KIDNEY DISEASE, WITH LONG-TERM CURRENT USE OF INSULIN (HCC): Primary | ICD-10-CM

## 2024-01-29 DIAGNOSIS — E21.3 HYPERPARATHYROIDISM (HCC): ICD-10-CM

## 2024-01-29 DIAGNOSIS — R19.5 POSITIVE COLORECTAL CANCER SCREENING USING COLOGUARD TEST: ICD-10-CM

## 2024-01-29 DIAGNOSIS — E66.01 SEVERE OBESITY (BMI 35.0-39.9) WITH COMORBIDITY (HCC): ICD-10-CM

## 2024-01-29 DIAGNOSIS — E11.22 TYPE 2 DIABETES MELLITUS WITH STAGE 3A CHRONIC KIDNEY DISEASE, WITH LONG-TERM CURRENT USE OF INSULIN (HCC): Primary | ICD-10-CM

## 2024-01-29 DIAGNOSIS — K70.30 ALCOHOLIC CIRRHOSIS OF LIVER WITHOUT ASCITES (HCC): ICD-10-CM

## 2024-01-29 DIAGNOSIS — N18.31 TYPE 2 DIABETES MELLITUS WITH STAGE 3A CHRONIC KIDNEY DISEASE, WITH LONG-TERM CURRENT USE OF INSULIN (HCC): Primary | ICD-10-CM

## 2024-01-29 DIAGNOSIS — I50.32 CHRONIC DIASTOLIC HEART FAILURE (HCC): ICD-10-CM

## 2024-01-29 DIAGNOSIS — I10 ESSENTIAL HYPERTENSION: ICD-10-CM

## 2024-01-29 LAB — HBA1C MFR BLD: 6.7 %

## 2024-01-29 PROCEDURE — 3078F DIAST BP <80 MM HG: CPT | Performed by: STUDENT IN AN ORGANIZED HEALTH CARE EDUCATION/TRAINING PROGRAM

## 2024-01-29 PROCEDURE — 3074F SYST BP LT 130 MM HG: CPT | Performed by: STUDENT IN AN ORGANIZED HEALTH CARE EDUCATION/TRAINING PROGRAM

## 2024-01-29 PROCEDURE — 83036 HEMOGLOBIN GLYCOSYLATED A1C: CPT | Performed by: STUDENT IN AN ORGANIZED HEALTH CARE EDUCATION/TRAINING PROGRAM

## 2024-01-29 PROCEDURE — 3044F HG A1C LEVEL LT 7.0%: CPT | Performed by: STUDENT IN AN ORGANIZED HEALTH CARE EDUCATION/TRAINING PROGRAM

## 2024-01-29 PROCEDURE — 1123F ACP DISCUSS/DSCN MKR DOCD: CPT | Performed by: STUDENT IN AN ORGANIZED HEALTH CARE EDUCATION/TRAINING PROGRAM

## 2024-01-29 PROCEDURE — 99214 OFFICE O/P EST MOD 30 MIN: CPT | Performed by: STUDENT IN AN ORGANIZED HEALTH CARE EDUCATION/TRAINING PROGRAM

## 2024-01-29 RX ORDER — ORAL SEMAGLUTIDE 14 MG/1
1 TABLET ORAL
Qty: 90 TABLET | Refills: 1 | Status: SHIPPED | OUTPATIENT
Start: 2024-01-29

## 2024-01-29 SDOH — ECONOMIC STABILITY: INCOME INSECURITY: IN THE LAST 12 MONTHS, WAS THERE A TIME WHEN YOU WERE NOT ABLE TO PAY THE MORTGAGE OR RENT ON TIME?: NO

## 2024-01-29 SDOH — ECONOMIC STABILITY: FOOD INSECURITY: WITHIN THE PAST 12 MONTHS, THE FOOD YOU BOUGHT JUST DIDN'T LAST AND YOU DIDN'T HAVE MONEY TO GET MORE.: NEVER TRUE

## 2024-01-29 SDOH — ECONOMIC STABILITY: FOOD INSECURITY: WITHIN THE PAST 12 MONTHS, YOU WORRIED THAT YOUR FOOD WOULD RUN OUT BEFORE YOU GOT MONEY TO BUY MORE.: NEVER TRUE

## 2024-01-29 ASSESSMENT — PATIENT HEALTH QUESTIONNAIRE - PHQ9
1. LITTLE INTEREST OR PLEASURE IN DOING THINGS: 0
SUM OF ALL RESPONSES TO PHQ QUESTIONS 1-9: 0
SUM OF ALL RESPONSES TO PHQ QUESTIONS 1-9: 0
2. FEELING DOWN, DEPRESSED OR HOPELESS: 0
SUM OF ALL RESPONSES TO PHQ9 QUESTIONS 1 & 2: 0
SUM OF ALL RESPONSES TO PHQ QUESTIONS 1-9: 0
SUM OF ALL RESPONSES TO PHQ QUESTIONS 1-9: 0

## 2024-01-29 ASSESSMENT — ENCOUNTER SYMPTOMS
WHEEZING: 0
SORE THROAT: 0
COUGH: 0
EYE DISCHARGE: 0
DIARRHEA: 0
ABDOMINAL PAIN: 0
VOMITING: 0
CHEST TIGHTNESS: 0
CONSTIPATION: 0
SHORTNESS OF BREATH: 0
NAUSEA: 0

## 2024-01-29 ASSESSMENT — SOCIAL DETERMINANTS OF HEALTH (SDOH): HOW HARD IS IT FOR YOU TO PAY FOR THE VERY BASICS LIKE FOOD, HOUSING, MEDICAL CARE, AND HEATING?: NOT HARD AT ALL

## 2024-01-29 NOTE — PROGRESS NOTES
12/08/2023    Hepatitis A vaccine (1 of 2 - Risk 2-dose series) 01/29/2025 (Originally 3/26/1974)    Low dose CT lung screening &/or counseling  03/03/2024    Diabetic Alb to Cr ratio (uACR) test  04/12/2024    Lipids  04/12/2024    Annual Wellness Visit (Medicare)  07/24/2024    Depression Screen  10/24/2024    GFR test (Diabetes, CKD 3-4, OR last GFR 15-59)  01/18/2025    A1C test (Diabetic or Prediabetic)  01/29/2025    DTaP/Tdap/Td vaccine (2 - Td or Tdap) 07/06/2028    Flu vaccine  Completed    Pneumococcal 65+ years Vaccine  Completed    AAA screen  Completed    Hib vaccine  Aged Out    Polio vaccine  Aged Out    Meningococcal (ACWY) vaccine  Aged Out    Pneumococcal 0-64 years Vaccine  Discontinued    HIV screen  Discontinued    Prostate Specific Antigen (PSA) Screening or Monitoring  Discontinued       Subjective:     Review of Systems   Constitutional:  Negative for appetite change, fatigue and fever.   HENT:  Negative for congestion, ear pain, hearing loss and sore throat.    Eyes:  Negative for discharge and visual disturbance.   Respiratory:  Negative for cough, chest tightness, shortness of breath and wheezing.    Cardiovascular:  Negative for chest pain, palpitations and leg swelling.   Gastrointestinal:  Negative for abdominal pain, constipation, diarrhea, nausea and vomiting.   Genitourinary:  Negative for flank pain, frequency, hematuria and urgency.   Musculoskeletal:  Negative for arthralgias, gait problem, joint swelling and myalgias.   Skin: Negative.    Neurological:  Negative for dizziness, weakness, numbness and headaches.   Psychiatric/Behavioral: Negative.  Negative for dysphoric mood. The patient is not nervous/anxious.        Objective:     Physical Exam  Vitals reviewed.   Constitutional:       Appearance: Normal appearance. He is obese.   HENT:      Head: Normocephalic and atraumatic.      Nose: Nose normal.      Mouth/Throat:      Mouth: Mucous membranes are moist.      Pharynx:

## 2024-02-02 ENCOUNTER — TELEPHONE (OUTPATIENT)
Dept: ONCOLOGY | Age: 69
End: 2024-02-02

## 2024-02-02 DIAGNOSIS — Z87.891 PERSONAL HISTORY OF NICOTINE DEPENDENCE: Primary | ICD-10-CM

## 2024-02-02 NOTE — TELEPHONE ENCOUNTER
Our records indicate that your patient is coming due for their annual lung cancer screening follow up testing.     For your convenience, we have pended the order for the scan for you. If you do not agree with the need for the test, please cancel the order and let us know.     Sincerely,    [facility]   Lung Cancer Screening Program    Auto printed reminder letter sent to patient.

## 2024-02-09 ENCOUNTER — OFFICE VISIT (OUTPATIENT)
Dept: ONCOLOGY | Age: 69
End: 2024-02-09
Payer: MEDICARE

## 2024-02-09 ENCOUNTER — HOSPITAL ENCOUNTER (OUTPATIENT)
Age: 69
Discharge: HOME | End: 2024-02-09
Payer: MEDICARE

## 2024-02-09 ENCOUNTER — TELEPHONE (OUTPATIENT)
Dept: ONCOLOGY | Age: 69
End: 2024-02-09

## 2024-02-09 VITALS
SYSTOLIC BLOOD PRESSURE: 136 MMHG | RESPIRATION RATE: 18 BRPM | DIASTOLIC BLOOD PRESSURE: 84 MMHG | HEART RATE: 88 BPM | TEMPERATURE: 97.2 F | WEIGHT: 251.6 LBS | BODY MASS INDEX: 38.26 KG/M2 | OXYGEN SATURATION: 93 %

## 2024-02-09 DIAGNOSIS — N18.31 TYPE 2 DIABETES MELLITUS WITH STAGE 3A CHRONIC KIDNEY DISEASE, WITH LONG-TERM CURRENT USE OF INSULIN (HCC): Primary | ICD-10-CM

## 2024-02-09 DIAGNOSIS — Z79.4 TYPE 2 DIABETES MELLITUS WITH STAGE 3A CHRONIC KIDNEY DISEASE, WITH LONG-TERM CURRENT USE OF INSULIN (HCC): Primary | ICD-10-CM

## 2024-02-09 DIAGNOSIS — E11.22 TYPE 2 DIABETES MELLITUS WITH STAGE 3A CHRONIC KIDNEY DISEASE, WITH LONG-TERM CURRENT USE OF INSULIN (HCC): Primary | ICD-10-CM

## 2024-02-09 DIAGNOSIS — D75.1 POLYCYTHEMIA: Primary | ICD-10-CM

## 2024-02-09 DIAGNOSIS — D75.1 POLYCYTHEMIA: ICD-10-CM

## 2024-02-09 LAB
BASOPHILS # BLD: 0.1 K/UL (ref 0–0.2)
BASOPHILS NFR BLD: 1 % (ref 0–2)
EOSINOPHIL # BLD: 0 K/UL (ref 0–0.4)
EOSINOPHILS RELATIVE PERCENT: 0 % (ref 1–4)
ERYTHROCYTE [DISTWIDTH] IN BLOOD BY AUTOMATED COUNT: 14.6 % (ref 12.5–15.4)
HCT VFR BLD AUTO: 49.9 % (ref 41–53)
HGB BLD-MCNC: 16.9 G/DL (ref 13.5–17.5)
LYMPHOCYTES NFR BLD: 1.7 K/UL (ref 1–4.8)
LYMPHOCYTES RELATIVE PERCENT: 26 % (ref 24–44)
MCH RBC QN AUTO: 29.8 PG (ref 26–34)
MCHC RBC AUTO-ENTMCNC: 33.8 G/DL (ref 31–37)
MCV RBC AUTO: 88.2 FL (ref 80–100)
MONOCYTES NFR BLD: 0.5 K/UL (ref 0.1–1.2)
MONOCYTES NFR BLD: 8 % (ref 2–11)
NEUTROPHILS NFR BLD: 65 % (ref 36–66)
NEUTS SEG NFR BLD: 4.3 K/UL (ref 1.8–7.7)
PLATELET # BLD AUTO: 189 K/UL (ref 140–450)
PMV BLD AUTO: 7.8 FL (ref 6–12)
RBC # BLD AUTO: 5.66 M/UL (ref 4.5–5.9)
WBC OTHER # BLD: 6.6 K/UL (ref 3.5–11)

## 2024-02-09 PROCEDURE — 99214 OFFICE O/P EST MOD 30 MIN: CPT | Performed by: INTERNAL MEDICINE

## 2024-02-09 PROCEDURE — G8427 DOCREV CUR MEDS BY ELIG CLIN: HCPCS | Performed by: INTERNAL MEDICINE

## 2024-02-09 PROCEDURE — G8484 FLU IMMUNIZE NO ADMIN: HCPCS | Performed by: INTERNAL MEDICINE

## 2024-02-09 PROCEDURE — 1123F ACP DISCUSS/DSCN MKR DOCD: CPT | Performed by: INTERNAL MEDICINE

## 2024-02-09 PROCEDURE — 3079F DIAST BP 80-89 MM HG: CPT | Performed by: INTERNAL MEDICINE

## 2024-02-09 PROCEDURE — G8417 CALC BMI ABV UP PARAM F/U: HCPCS | Performed by: INTERNAL MEDICINE

## 2024-02-09 PROCEDURE — 36415 COLL VENOUS BLD VENIPUNCTURE: CPT

## 2024-02-09 PROCEDURE — 3017F COLORECTAL CA SCREEN DOC REV: CPT | Performed by: INTERNAL MEDICINE

## 2024-02-09 PROCEDURE — 85025 COMPLETE CBC W/AUTO DIFF WBC: CPT

## 2024-02-09 PROCEDURE — 99211 OFF/OP EST MAY X REQ PHY/QHP: CPT | Performed by: INTERNAL MEDICINE

## 2024-02-09 PROCEDURE — 1036F TOBACCO NON-USER: CPT | Performed by: INTERNAL MEDICINE

## 2024-02-09 PROCEDURE — 3075F SYST BP GE 130 - 139MM HG: CPT | Performed by: INTERNAL MEDICINE

## 2024-02-09 RX ORDER — PEN NEEDLE, DIABETIC 32GX 5/32"
NEEDLE, DISPOSABLE MISCELLANEOUS
Qty: 100 EACH | Refills: 2 | Status: SHIPPED | OUTPATIENT
Start: 2024-02-09

## 2024-02-09 NOTE — TELEPHONE ENCOUNTER
Buck Sloan is calling to request a refill on the following medication(s):    Medication Request:  Requested Prescriptions     Pending Prescriptions Disp Refills    BD PEN NEEDLE TERESITA 2ND GEN 32G X 4 MM MISC [Pharmacy Med Name: BD Pen Needle Teresita 2nd Gen Miscellaneous 32G X 4 MM]  0     Sig: USE WITH INSULIN FOUR TIMES DAILY       Last Visit Date (If Applicable):  1/29/2024    Next Visit Date:    4/29/2024

## 2024-02-09 NOTE — TELEPHONE ENCOUNTER
RV 6 months with CBC at RV       Rv scheduled for 8/9/24 @ 10:00am with cbc drawn at rv    PT was given AVS and appt schedule    Electronically signed by Evelina Richard on 2/9/2024 at 11:30 AM

## 2024-02-16 NOTE — PROGRESS NOTES
_           Chief Complaint   Patient presents with    Follow-up     6 month     DIAGNOSIS:         Polycythemia, reactive polycythemia  History of tobacco abuse.  Quit 7 years ago  Overweight  Probable sleep apnea  Hepatitis C  Mild chronic renal insufficiency  CURRENT THERAPY:         Observation monitoring of polycythemia    BRIEF CASE HISTORY:      Mr. Buck Sloan is a very pleasant 68 y.o. male with history of multiple co morbidities as listed.  The patient is referred for evaluation and management of polycythemia.  The patient was noted to have elevation of red blood cells over the last few lab tests.  Patient denies any headaches or dizziness.  No TIA or CVA-like symptoms.  No chest pain or anginal symptoms.  The patient had history of mild chronic renal insufficiency.  He has history of diabetes and liver disease.  He had history of hepatitis C on treatment.  He is overweight.  The patient managed to quit smoking 7 years ago.  Used to smoke 1 pack/day for about 20 years.  Denies alcohol drinking..     INTERIM HISTORY:   Patient seen for follow-up polycythemia.  He is clinically stable.  No chest pain or anginal symptoms.  No TIA or CVA-like symptoms.  No headaches.  No fever or infections.  No other complaints.    PAST MEDICAL HISTORY: has a past medical history of Hepatitis C, Hypertension, Liver disease, Non-rheumatic mitral regurgitation, Prediabetes, Renal insufficiency, Severe obesity (BMI 35.0-39.9), and Ventricular tachycardia (HCC).    PAST SURGICAL HISTORY: has no past surgical history on file.     CURRENT MEDICATIONS:  has a current medication list which includes the following prescription(s): rybelsus, metoprolol tartrate, lantus solostar, atorvastatin, farxiga, freestyle lite, freestyle lancets, bd pen needle shruthi 2nd gen, and cinacalcet.    ALLERGIES:  has No Known Allergies.    FAMILY HISTORY: Mother had

## 2024-03-04 ENCOUNTER — HOSPITAL ENCOUNTER (OUTPATIENT)
Dept: CT IMAGING | Age: 69
Discharge: HOME | End: 2024-03-06
Payer: MEDICARE

## 2024-03-04 DIAGNOSIS — Z87.891 PERSONAL HISTORY OF NICOTINE DEPENDENCE: ICD-10-CM

## 2024-03-04 PROCEDURE — 71271 CT THORAX LUNG CANCER SCR C-: CPT

## 2024-03-25 DIAGNOSIS — E11.9 TYPE 2 DIABETES MELLITUS WITHOUT COMPLICATION, WITH LONG-TERM CURRENT USE OF INSULIN (HCC): ICD-10-CM

## 2024-03-25 DIAGNOSIS — Z79.4 TYPE 2 DIABETES MELLITUS WITHOUT COMPLICATION, WITH LONG-TERM CURRENT USE OF INSULIN (HCC): ICD-10-CM

## 2024-03-25 RX ORDER — DAPAGLIFLOZIN 10 MG/1
TABLET, FILM COATED ORAL
Qty: 90 TABLET | Refills: 0 | Status: SHIPPED | OUTPATIENT
Start: 2024-03-25

## 2024-03-25 NOTE — TELEPHONE ENCOUNTER
Buck Sloan is calling to request a refill on the following medication(s):    Medication Request:  Requested Prescriptions     Pending Prescriptions Disp Refills    FARXIGA 10 MG tablet [Pharmacy Med Name: Farxiga Oral Tablet 10 MG] 90 tablet 0     Sig: TAKE 1 TABLET BY MOUTH IN THE MORNING       Last Visit Date (If Applicable):  1/29/2024    Next Visit Date:    4/29/2024

## 2024-04-29 ENCOUNTER — OFFICE VISIT (OUTPATIENT)
Dept: FAMILY MEDICINE CLINIC | Age: 69
End: 2024-04-29

## 2024-04-29 VITALS
HEIGHT: 68 IN | HEART RATE: 78 BPM | BODY MASS INDEX: 37.89 KG/M2 | OXYGEN SATURATION: 97 % | SYSTOLIC BLOOD PRESSURE: 126 MMHG | WEIGHT: 250 LBS | DIASTOLIC BLOOD PRESSURE: 72 MMHG

## 2024-04-29 DIAGNOSIS — I10 ESSENTIAL HYPERTENSION: ICD-10-CM

## 2024-04-29 DIAGNOSIS — Z12.5 PROSTATE CANCER SCREENING: ICD-10-CM

## 2024-04-29 DIAGNOSIS — Z79.4 TYPE 2 DIABETES MELLITUS WITH STAGE 3A CHRONIC KIDNEY DISEASE, WITH LONG-TERM CURRENT USE OF INSULIN (HCC): Primary | ICD-10-CM

## 2024-04-29 DIAGNOSIS — E11.22 TYPE 2 DIABETES MELLITUS WITH STAGE 3A CHRONIC KIDNEY DISEASE, WITH LONG-TERM CURRENT USE OF INSULIN (HCC): Primary | ICD-10-CM

## 2024-04-29 DIAGNOSIS — N18.31 TYPE 2 DIABETES MELLITUS WITH STAGE 3A CHRONIC KIDNEY DISEASE, WITH LONG-TERM CURRENT USE OF INSULIN (HCC): Primary | ICD-10-CM

## 2024-04-29 DIAGNOSIS — E21.3 HYPERPARATHYROIDISM (HCC): ICD-10-CM

## 2024-04-29 LAB — HBA1C MFR BLD: 6.7 %

## 2024-04-29 ASSESSMENT — ENCOUNTER SYMPTOMS
COUGH: 0
VOMITING: 0
CHEST TIGHTNESS: 0
WHEEZING: 0
SHORTNESS OF BREATH: 0
DIARRHEA: 0
EYE DISCHARGE: 0
CONSTIPATION: 0
SORE THROAT: 0
NAUSEA: 0
ABDOMINAL PAIN: 0

## 2024-04-29 NOTE — PROGRESS NOTES
[facility]  [address]  [address]  [facility] [place] [address]  Dept: [phone]  Dept Fax: [phone]    Buck Sloan is a 69 y.o. male who is a Established patient, who presents today for his medical conditions/complaints as noted below:  Chief Complaint   Patient presents with    Diabetes    Hypertension         HPI:     He is here today to follow-up on diabetes and hypertension.  He states that he has been taking all his medications regularly and has not had any issues.  He follows with nephrology and endocrinology for his hyperparathyroidism.  He states that Cinacalcet has been helping bring his calcium levels down.  He is due to follow-up with endocrinology for possible parathyroid surgery.  He states that his sister also had to have a parathyroid surgery done.  He is due for his labs.  He has a history of positive Cologuard, declines follow-up colonoscopy.        Hemoglobin A1C (%)   Date Value   04/29/2024 6.7   01/29/2024 6.7   10/24/2023 6.5             ( goal A1Cis < 7)   No components found for: \"LABMICR\"  LDL Cholesterol (mg/dL)   Date Value   01/06/2022 71   12/16/2020 132 (H)   11/15/2019 110     LDL Calculated (mg/dL)   Date Value   04/12/2023 67       (goal LDL is <100)   AST (U/L)   Date Value   01/18/2024 27     ALT (U/L)   Date Value   01/18/2024 38     BUN (mg/dL)   Date Value   01/18/2024 16   01/18/2024 16     BP Readings from Last 3 Encounters:   04/29/24 126/72   04/18/24 120/72   02/09/24 136/84          (goal 120/80)    Past Medical History:   Diagnosis Date    Hepatitis C     Hypertension     Liver disease     sees GI, treated for Hep C     Non-rheumatic mitral regurgitation     sees cardio,    Prediabetes 7/2/2018    Renal insufficiency 6/26/2018    Severe obesity (BMI 35.0-39.9) 9/11/2018    Ventricular tachycardia (HCC)       History reviewed. No pertinent surgical history.    Family History   Problem Relation Age of Onset    Breast Cancer Mother

## 2024-06-04 RX ORDER — ATORVASTATIN CALCIUM 20 MG/1
TABLET, FILM COATED ORAL
Qty: 90 TABLET | Refills: 1 | Status: SHIPPED | OUTPATIENT
Start: 2024-06-04

## 2024-06-04 NOTE — TELEPHONE ENCOUNTER
Buck Sloan is calling to request a refill on the following medication(s):    Medication Request:  Requested Prescriptions     Pending Prescriptions Disp Refills    atorvastatin (LIPITOR) 20 MG tablet [Pharmacy Med Name: Atorvastatin Calcium Oral Tablet 20 MG] 90 tablet 0     Sig: TAKE 1 TABLET BY MOUTH EVERY DAY       Last Visit Date (If Applicable):  4/29/2024    Next Visit Date:    8/1/2024

## 2024-06-05 DIAGNOSIS — E11.9 TYPE 2 DIABETES MELLITUS WITHOUT COMPLICATION, WITH LONG-TERM CURRENT USE OF INSULIN (HCC): ICD-10-CM

## 2024-06-05 DIAGNOSIS — Z79.4 TYPE 2 DIABETES MELLITUS WITHOUT COMPLICATION, WITH LONG-TERM CURRENT USE OF INSULIN (HCC): ICD-10-CM

## 2024-06-06 RX ORDER — INSULIN GLARGINE 100 [IU]/ML
INJECTION, SOLUTION SUBCUTANEOUS
Qty: 15 ML | Refills: 1 | Status: SHIPPED | OUTPATIENT
Start: 2024-06-06

## 2024-06-06 NOTE — TELEPHONE ENCOUNTER
Buck Sloan is calling to request a refill on the following medication(s):    Medication Request:  Requested Prescriptions     Pending Prescriptions Disp Refills    LANTUS SOLOSTAR 100 UNIT/ML injection pen [Pharmacy Med Name: Lantus SoloStar Subcutaneous Solution Pen-injector 100 UNIT/ML] 15 mL 0     Sig: Inject 20 Units subcitaneously 2 times daily       Last Visit Date (If Applicable):  4/29/2024    Next Visit Date:    8/1/2024

## 2024-06-25 DIAGNOSIS — E11.9 TYPE 2 DIABETES MELLITUS WITHOUT COMPLICATION, WITH LONG-TERM CURRENT USE OF INSULIN (HCC): ICD-10-CM

## 2024-06-25 DIAGNOSIS — Z79.4 TYPE 2 DIABETES MELLITUS WITHOUT COMPLICATION, WITH LONG-TERM CURRENT USE OF INSULIN (HCC): ICD-10-CM

## 2024-06-25 RX ORDER — DAPAGLIFLOZIN 10 MG/1
TABLET, FILM COATED ORAL
Qty: 90 TABLET | Refills: 1 | Status: SHIPPED | OUTPATIENT
Start: 2024-06-25

## 2024-06-25 NOTE — TELEPHONE ENCOUNTER
Buck Sloan is calling to request a refill on the following medication(s):    Medication Request:  Requested Prescriptions     Pending Prescriptions Disp Refills    FARXIGA 10 MG tablet [Pharmacy Med Name: Farxiga Oral Tablet 10 MG] 90 tablet 0     Sig: TAKE 1 TABLET BY MOUTH IN THE MORNING       Last Visit Date (If Applicable):  4/29/2024    Next Visit Date:    8/1/2024

## 2024-07-09 DIAGNOSIS — N18.31 TYPE 2 DIABETES MELLITUS WITH STAGE 3A CHRONIC KIDNEY DISEASE, WITH LONG-TERM CURRENT USE OF INSULIN (HCC): ICD-10-CM

## 2024-07-09 DIAGNOSIS — E11.22 TYPE 2 DIABETES MELLITUS WITH STAGE 3A CHRONIC KIDNEY DISEASE, WITH LONG-TERM CURRENT USE OF INSULIN (HCC): ICD-10-CM

## 2024-07-09 DIAGNOSIS — Z79.4 TYPE 2 DIABETES MELLITUS WITH STAGE 3A CHRONIC KIDNEY DISEASE, WITH LONG-TERM CURRENT USE OF INSULIN (HCC): ICD-10-CM

## 2024-07-09 RX ORDER — PEN NEEDLE, DIABETIC 32GX 5/32"
NEEDLE, DISPOSABLE MISCELLANEOUS
Qty: 100 EACH | Refills: 1 | Status: SHIPPED | OUTPATIENT
Start: 2024-07-09

## 2024-07-09 NOTE — TELEPHONE ENCOUNTER
Buck Sloan is calling to request a refill on the following medication(s):    Medication Request:  Requested Prescriptions     Pending Prescriptions Disp Refills    BD PEN NEEDLE TERESITA 2ND GEN 32G X 4 MM MISC [Pharmacy Med Name: BD Pen Needle Teresita 2nd Gen Miscellaneous 32G X 4 MM]  0     Sig: USE WITH INSULIN FOUR TIMES DAILY       Last Visit Date (If Applicable):  Visit date not found    Next Visit Date:    Visit date not found

## 2024-07-22 ENCOUNTER — HOSPITAL ENCOUNTER (OUTPATIENT)
Age: 69
Setting detail: SPECIMEN
Discharge: HOME | End: 2024-07-22

## 2024-07-22 DIAGNOSIS — N18.31 TYPE 2 DIABETES MELLITUS WITH STAGE 3A CHRONIC KIDNEY DISEASE, WITH LONG-TERM CURRENT USE OF INSULIN (HCC): ICD-10-CM

## 2024-07-22 DIAGNOSIS — Z12.5 PROSTATE CANCER SCREENING: ICD-10-CM

## 2024-07-22 DIAGNOSIS — N18.31 STAGE 3A CHRONIC KIDNEY DISEASE (HCC): ICD-10-CM

## 2024-07-22 DIAGNOSIS — I10 ESSENTIAL HYPERTENSION: ICD-10-CM

## 2024-07-22 DIAGNOSIS — E11.22 TYPE 2 DIABETES MELLITUS WITH STAGE 3A CHRONIC KIDNEY DISEASE, WITH LONG-TERM CURRENT USE OF INSULIN (HCC): ICD-10-CM

## 2024-07-22 DIAGNOSIS — E21.3 HYPERPARATHYROIDISM (HCC): ICD-10-CM

## 2024-07-22 DIAGNOSIS — Z79.4 TYPE 2 DIABETES MELLITUS WITH STAGE 3A CHRONIC KIDNEY DISEASE, WITH LONG-TERM CURRENT USE OF INSULIN (HCC): ICD-10-CM

## 2024-07-22 LAB
BASOPHILS # BLD: 0.05 K/UL (ref 0–0.2)
BASOPHILS # BLD: 0.05 K/UL (ref 0–0.2)
BASOPHILS NFR BLD: 1 % (ref 0–2)
BASOPHILS NFR BLD: 1 % (ref 0–2)
CA-I BLD-SCNC: 1.14 MMOL/L (ref 1.13–1.33)
CREAT UR-MCNC: 83.4 MG/DL (ref 39–259)
CREAT UR-MCNC: 83.9 MG/DL (ref 39–259)
EOSINOPHIL # BLD: 0.05 K/UL (ref 0–0.44)
EOSINOPHIL # BLD: 0.05 K/UL (ref 0–0.44)
EOSINOPHILS RELATIVE PERCENT: 1 % (ref 1–4)
EOSINOPHILS RELATIVE PERCENT: 1 % (ref 1–4)
ERYTHROCYTE [DISTWIDTH] IN BLOOD BY AUTOMATED COUNT: 14.2 % (ref 11.8–14.4)
ERYTHROCYTE [DISTWIDTH] IN BLOOD BY AUTOMATED COUNT: 14.2 % (ref 11.8–14.4)
HCT VFR BLD AUTO: 52.2 % (ref 40.7–50.3)
HCT VFR BLD AUTO: 52.2 % (ref 40.7–50.3)
HGB BLD-MCNC: 17.5 G/DL (ref 13–17)
HGB BLD-MCNC: 17.5 G/DL (ref 13–17)
IMM GRANULOCYTES # BLD AUTO: 0.03 K/UL (ref 0–0.3)
IMM GRANULOCYTES # BLD AUTO: 0.03 K/UL (ref 0–0.3)
IMM GRANULOCYTES NFR BLD: 0 %
IMM GRANULOCYTES NFR BLD: 0 %
LYMPHOCYTES NFR BLD: 2.13 K/UL (ref 1.1–3.7)
LYMPHOCYTES NFR BLD: 2.13 K/UL (ref 1.1–3.7)
LYMPHOCYTES RELATIVE PERCENT: 29 % (ref 24–43)
LYMPHOCYTES RELATIVE PERCENT: 29 % (ref 24–43)
MCH RBC QN AUTO: 30.3 PG (ref 25.2–33.5)
MCH RBC QN AUTO: 30.3 PG (ref 25.2–33.5)
MCHC RBC AUTO-ENTMCNC: 33.5 G/DL (ref 28.4–34.8)
MCHC RBC AUTO-ENTMCNC: 33.5 G/DL (ref 28.4–34.8)
MCV RBC AUTO: 90.3 FL (ref 82.6–102.9)
MCV RBC AUTO: 90.3 FL (ref 82.6–102.9)
MONOCYTES NFR BLD: 0.66 K/UL (ref 0.1–1.2)
MONOCYTES NFR BLD: 0.66 K/UL (ref 0.1–1.2)
MONOCYTES NFR BLD: 9 % (ref 3–12)
MONOCYTES NFR BLD: 9 % (ref 3–12)
NEUTROPHILS NFR BLD: 60 % (ref 36–65)
NEUTROPHILS NFR BLD: 60 % (ref 36–65)
NEUTS SEG NFR BLD: 4.34 K/UL (ref 1.5–8.1)
NEUTS SEG NFR BLD: 4.34 K/UL (ref 1.5–8.1)
NRBC BLD-RTO: 0 PER 100 WBC
NRBC BLD-RTO: 0 PER 100 WBC
PLATELET # BLD AUTO: 202 K/UL (ref 138–453)
PLATELET # BLD AUTO: 202 K/UL (ref 138–453)
PMV BLD AUTO: 10.1 FL (ref 8.1–13.5)
PMV BLD AUTO: 10.1 FL (ref 8.1–13.5)
PTH-INTACT SERPL-MCNC: 15 PG/ML (ref 15–65)
RBC # BLD AUTO: 5.78 M/UL (ref 4.21–5.77)
RBC # BLD AUTO: 5.78 M/UL (ref 4.21–5.77)
TOTAL PROTEIN, URINE: 10 MG/DL
TOTAL PROTEIN, URINE: 9 MG/DL
URINE TOTAL PROTEIN CREATININE RATIO: 0.11
WBC OTHER # BLD: 7.3 K/UL (ref 3.5–11.3)
WBC OTHER # BLD: 7.3 K/UL (ref 3.5–11.3)

## 2024-07-23 LAB
25(OH)D3 SERPL-MCNC: 33.8 NG/ML (ref 30–100)
25(OH)D3 SERPL-MCNC: 34.3 NG/ML (ref 30–100)
ALBUMIN SERPL-MCNC: 4.6 G/DL (ref 3.5–5.2)
ALBUMIN/GLOB SERPL: 2 {RATIO} (ref 1–2.5)
ALP SERPL-CCNC: 95 U/L (ref 40–129)
ALT SERPL-CCNC: 44 U/L (ref 10–50)
ANION GAP SERPL CALCULATED.3IONS-SCNC: 19 MMOL/L (ref 9–16)
ANION GAP SERPL CALCULATED.3IONS-SCNC: 19 MMOL/L (ref 9–16)
AST SERPL-CCNC: 35 U/L (ref 10–50)
BILIRUB SERPL-MCNC: 1.2 MG/DL (ref 0–1.2)
BUN SERPL-MCNC: 17 MG/DL (ref 8–23)
BUN SERPL-MCNC: 18 MG/DL (ref 8–23)
CALCIUM SERPL-MCNC: 8.5 MG/DL (ref 8.6–10.4)
CALCIUM SERPL-MCNC: 8.7 MG/DL (ref 8.6–10.4)
CHLORIDE SERPL-SCNC: 102 MMOL/L (ref 98–107)
CHLORIDE SERPL-SCNC: 103 MMOL/L (ref 98–107)
CHOLEST SERPL-MCNC: 144 MG/DL (ref 0–199)
CHOLESTEROL/HDL RATIO: 5
CO2 SERPL-SCNC: 17 MMOL/L (ref 20–31)
CO2 SERPL-SCNC: 18 MMOL/L (ref 20–31)
CREAT SERPL-MCNC: 1.2 MG/DL (ref 0.7–1.2)
CREAT SERPL-MCNC: 1.2 MG/DL (ref 0.7–1.2)
GFR, ESTIMATED: 65 ML/MIN/1.73M2
GFR, ESTIMATED: 65 ML/MIN/1.73M2
GLUCOSE SERPL-MCNC: 163 MG/DL (ref 74–99)
GLUCOSE SERPL-MCNC: 170 MG/DL (ref 74–99)
HDLC SERPL-MCNC: 30 MG/DL
LDLC SERPL CALC-MCNC: 45 MG/DL (ref 0–100)
PHOSPHATE SERPL-MCNC: 3.9 MG/DL (ref 2.5–4.5)
POTASSIUM SERPL-SCNC: 4.3 MMOL/L (ref 3.7–5.3)
POTASSIUM SERPL-SCNC: 4.6 MMOL/L (ref 3.7–5.3)
PROT SERPL-MCNC: 7.4 G/DL (ref 6.6–8.7)
PSA SERPL-MCNC: 0.2 NG/ML (ref 0–4)
SODIUM SERPL-SCNC: 139 MMOL/L (ref 136–145)
SODIUM SERPL-SCNC: 139 MMOL/L (ref 136–145)
TRIGL SERPL-MCNC: 348 MG/DL
TSH SERPL DL<=0.05 MIU/L-ACNC: 1.61 UIU/ML (ref 0.27–4.2)
VLDLC SERPL CALC-MCNC: 70 MG/DL

## 2024-07-24 LAB
CREAT UR-MCNC: 82.3 MG/DL (ref 39–259)
MICROALBUMIN UR-MCNC: 19 MG/L (ref 0–20)
MICROALBUMIN/CREAT UR-RTO: 23 MCG/MG CREAT (ref 0–17)

## 2024-08-01 ENCOUNTER — OFFICE VISIT (OUTPATIENT)
Dept: FAMILY MEDICINE CLINIC | Age: 69
End: 2024-08-01

## 2024-08-01 VITALS
OXYGEN SATURATION: 96 % | HEART RATE: 88 BPM | DIASTOLIC BLOOD PRESSURE: 74 MMHG | BODY MASS INDEX: 38.01 KG/M2 | SYSTOLIC BLOOD PRESSURE: 118 MMHG | HEIGHT: 68 IN

## 2024-08-01 DIAGNOSIS — Z00.00 MEDICARE ANNUAL WELLNESS VISIT, SUBSEQUENT: Primary | ICD-10-CM

## 2024-08-01 DIAGNOSIS — K70.30 ALCOHOLIC CIRRHOSIS OF LIVER WITHOUT ASCITES (HCC): ICD-10-CM

## 2024-08-01 ASSESSMENT — LIFESTYLE VARIABLES
HOW OFTEN DO YOU HAVE A DRINK CONTAINING ALCOHOL: NEVER
HOW MANY STANDARD DRINKS CONTAINING ALCOHOL DO YOU HAVE ON A TYPICAL DAY: PATIENT DOES NOT DRINK

## 2024-08-01 ASSESSMENT — PATIENT HEALTH QUESTIONNAIRE - PHQ9
1. LITTLE INTEREST OR PLEASURE IN DOING THINGS: NOT AT ALL
SUM OF ALL RESPONSES TO PHQ QUESTIONS 1-9: 0
SUM OF ALL RESPONSES TO PHQ9 QUESTIONS 1 & 2: 0
SUM OF ALL RESPONSES TO PHQ QUESTIONS 1-9: 0
2. FEELING DOWN, DEPRESSED OR HOPELESS: NOT AT ALL
SUM OF ALL RESPONSES TO PHQ QUESTIONS 1-9: 0
SUM OF ALL RESPONSES TO PHQ QUESTIONS 1-9: 0

## 2024-08-01 NOTE — PROGRESS NOTES
Medicare Annual Wellness Visit    Buck Sloan is here for Medicare AWV    Assessment & Plan   Medicare annual wellness visit, subsequent  Alcoholic cirrhosis of liver without ascites (HCC)  -     Solitario Robertson MD, Gastroenterology, [place]  Recommendations for Preventive Services Due: see orders and patient instructions/AVS.  Recommended screening schedule for the next 5-10 years is provided to the patient in written form: see Patient Instructions/AVS.     Return in 3 months (on 11/1/2024) for Follow up DM/HTN.     Subjective   The following acute and/or chronic problems were also addressed today:  He states that he has been doing well overall.  He has history of alcoholic liver cirrhosis and his last Cologuard was positive.  He has not had repeat colonoscopy or followed up with gastroenterology in a long time, agrees to schedule appointment.    Patient's complete Health Risk Assessment and screening values have been reviewed and are found in Flowsheets. The following problems were reviewed today and where indicated follow up appointments were made and/or referrals ordered.    Positive Risk Factor Screenings with Interventions:                Inactivity:  On average, how many days per week do you engage in moderate to strenuous exercise (like a brisk walk)?: 1 day (!) Abnormal  On average, how many minutes do you engage in exercise at this level?: 30 min  Interventions:  Patient declined any further interventions or treatment     Abnormal BMI (obese):  Body mass index is 38.01 kg/m². (!) Abnormal  Interventions:  Patient declines any further evaluation or treatment           Safety:  Do you have non-slip mats or non-slip surfaces or shower bars or grab bars in your shower or bathtub?: (!) No  Interventions:  Patient declined any further interventions or treatment     Advanced Directives:  Do you have a Living Will?: (!) No    Intervention:  has NO advanced directive - information provided

## 2024-08-01 NOTE — PATIENT INSTRUCTIONS
avoid saturated and trans fats. They increase your risk of heart disease by raising cholesterol levels.     Limit the amount of solid fat--butter, margarine, and shortening--you eat. Use olive, peanut, or canola oil when you cook. Bake, broil, and steam foods instead of frying them.     Eat a variety of fruit and vegetables every day. Dark green, deep orange, red, or yellow fruits and vegetables are especially good for you. Examples include spinach, carrots, peaches, and berries.     Foods high in fiber can reduce your cholesterol and provide important vitamins and minerals. High-fiber foods include whole-grain cereals and breads, oatmeal, beans, brown rice, citrus fruits, and apples.     Eat lean proteins. Heart-healthy proteins include seafood, lean meats and poultry, eggs, beans, peas, nuts, seeds, and soy products.     Limit drinks and foods with added sugar. These include candy, desserts, and soda pop.   Heart-healthy lifestyle    If your doctor recommends it, get more exercise. For many people, walking is a good choice. Or you may want to swim, bike, or do other activities. Bit by bit, increase the time you're active every day. Try for at least 30 minutes on most days of the week.     Try to quit or cut back on using tobacco and other nicotine products. This includes smoking and vaping. If you need help quitting, talk to your doctor about stop-smoking programs and medicines. These can increase your chances of quitting for good. Quitting is one of the most important things you can do to protect your heart. It is never too late to quit. Try to avoid secondhand smoke too.     Stay at a weight that's healthy for you. Talk to your doctor if you need help losing weight.     Try to get 7 to 9 hours of sleep each night.     Limit alcohol to 2 drinks a day for men and 1 drink a day for women. Too much alcohol can cause health problems.     Manage other health problems such as diabetes, high blood pressure, and high

## 2024-08-06 ENCOUNTER — TELEPHONE (OUTPATIENT)
Dept: FAMILY MEDICINE CLINIC | Age: 69
End: 2024-08-06

## 2024-08-06 ENCOUNTER — TELEPHONE (OUTPATIENT)
Dept: GASTROENTEROLOGY | Age: 69
End: 2024-08-06

## 2024-08-06 DIAGNOSIS — K70.30 ALCOHOLIC CIRRHOSIS OF LIVER WITHOUT ASCITES (HCC): Primary | ICD-10-CM

## 2024-08-06 NOTE — TELEPHONE ENCOUNTER
NP/Alcoholic cirrhosis of liver without ascites/Medicare  1st attempt- Called pt and LVM to call the office.  2nd attempt- Sent NP letter.

## 2024-08-06 NOTE — TELEPHONE ENCOUNTER
Patient called and stated the GI doctor you referred him too in [place] no longer practices there. Patient said he wants to stay at that location, he just needs a new referral with a different doctor at the location.

## 2024-08-09 ENCOUNTER — TELEPHONE (OUTPATIENT)
Dept: ONCOLOGY | Age: 69
End: 2024-08-09

## 2024-08-09 ENCOUNTER — OFFICE VISIT (OUTPATIENT)
Dept: ONCOLOGY | Age: 69
End: 2024-08-09
Payer: MEDICARE

## 2024-08-09 VITALS
WEIGHT: 246.2 LBS | BODY MASS INDEX: 37.43 KG/M2 | RESPIRATION RATE: 18 BRPM | SYSTOLIC BLOOD PRESSURE: 113 MMHG | TEMPERATURE: 97.3 F | OXYGEN SATURATION: 94 % | HEART RATE: 75 BPM | DIASTOLIC BLOOD PRESSURE: 74 MMHG

## 2024-08-09 DIAGNOSIS — D75.1 POLYCYTHEMIA: Primary | ICD-10-CM

## 2024-08-09 PROCEDURE — 3078F DIAST BP <80 MM HG: CPT | Performed by: INTERNAL MEDICINE

## 2024-08-09 PROCEDURE — 99211 OFF/OP EST MAY X REQ PHY/QHP: CPT | Performed by: INTERNAL MEDICINE

## 2024-08-09 PROCEDURE — G8417 CALC BMI ABV UP PARAM F/U: HCPCS | Performed by: INTERNAL MEDICINE

## 2024-08-09 PROCEDURE — 3017F COLORECTAL CA SCREEN DOC REV: CPT | Performed by: INTERNAL MEDICINE

## 2024-08-09 PROCEDURE — 3074F SYST BP LT 130 MM HG: CPT | Performed by: INTERNAL MEDICINE

## 2024-08-09 PROCEDURE — 1123F ACP DISCUSS/DSCN MKR DOCD: CPT | Performed by: INTERNAL MEDICINE

## 2024-08-09 PROCEDURE — G8427 DOCREV CUR MEDS BY ELIG CLIN: HCPCS | Performed by: INTERNAL MEDICINE

## 2024-08-09 PROCEDURE — 99214 OFFICE O/P EST MOD 30 MIN: CPT | Performed by: INTERNAL MEDICINE

## 2024-08-09 PROCEDURE — 1036F TOBACCO NON-USER: CPT | Performed by: INTERNAL MEDICINE

## 2024-08-09 NOTE — PROGRESS NOTES
not in acute distress.  Vital signs: Blood pressure 113/74, pulse 75, temperature 97.3 °F (36.3 °C), temperature source Temporal, resp. rate 18, weight 111.7 kg (246 lb 3.2 oz), SpO2 94 %.     General appearance - well appearing, not in pain or distress  Mental status - good mood, alert and oriented  Eyes - pupils equal and reactive, extraocular eye movements intact  Ears - bilateral TM's and external ear canals normal  Nose - normal and patent, no erythema, discharge or polyps  Mouth - mucous membranes moist, pharynx normal without lesions  Neck - supple, no significant adenopathy  Lymphatics - no palpable lymphadenopathy, no hepatosplenomegaly  Chest - clear to auscultation, no wheezes, rales or rhonchi, symmetric air entry  Heart - normal rate, regular rhythm, normal S1, S2, no murmurs, rubs, clicks or gallops  Abdomen - soft, nontender, nondistended, no masses or organomegaly  Neurological - alert, oriented, normal speech, no focal findings or movement disorder noted  Musculoskeletal - no joint tenderness, deformity or swelling  Extremities - peripheral pulses normal, no pedal edema, no clubbing or cyanosis  Skin - normal coloration and turgor, no rashes, no suspicious skin lesions noted     Review of Diagnostic data:   Lab Results   Component Value Date    WBC 7.3 07/22/2024    WBC 7.3 07/22/2024    HGB 17.5 (H) 07/22/2024    HGB 17.5 (H) 07/22/2024    HCT 52.2 (H) 07/22/2024    HCT 52.2 (H) 07/22/2024    MCV 90.3 07/22/2024    MCV 90.3 07/22/2024     07/22/2024     07/22/2024       Chemistry        Component Value Date/Time     07/22/2024 1016     07/22/2024 1016    K 4.6 07/22/2024 1016    K 4.3 07/22/2024 1016     07/22/2024 1016     07/22/2024 1016    CO2 18 (L) 07/22/2024 1016    CO2 17 (L) 07/22/2024 1016    BUN 18 07/22/2024 1016    BUN 17 07/22/2024 1016    CREATININE 1.2 07/22/2024 1016    CREATININE 1.2 07/22/2024 1016        Component Value Date/Time    CALCIUM

## 2024-08-09 NOTE — TELEPHONE ENCOUNTER
BONNIE HERE FOR FOLLOW UP   RV 6 months with CBC at RV  MD VISIT: 2/14/25 @ 10:15AM   AVS PRINTED AND GIVEN ON EXIT

## 2024-08-26 DIAGNOSIS — E11.22 TYPE 2 DIABETES MELLITUS WITH STAGE 3A CHRONIC KIDNEY DISEASE, WITH LONG-TERM CURRENT USE OF INSULIN (HCC): ICD-10-CM

## 2024-08-26 DIAGNOSIS — Z79.4 TYPE 2 DIABETES MELLITUS WITH STAGE 3A CHRONIC KIDNEY DISEASE, WITH LONG-TERM CURRENT USE OF INSULIN (HCC): ICD-10-CM

## 2024-08-26 DIAGNOSIS — N18.31 TYPE 2 DIABETES MELLITUS WITH STAGE 3A CHRONIC KIDNEY DISEASE, WITH LONG-TERM CURRENT USE OF INSULIN (HCC): ICD-10-CM

## 2024-08-26 RX ORDER — ORAL SEMAGLUTIDE 14 MG/1
TABLET ORAL
Qty: 90 TABLET | Refills: 0 | Status: SHIPPED | OUTPATIENT
Start: 2024-08-26

## 2024-08-26 NOTE — TELEPHONE ENCOUNTER
Buck Sloan is calling to request a refill on the following medication(s):    Medication Request:  Requested Prescriptions     Pending Prescriptions Disp Refills    RYBELSUS 14 MG TABS [Pharmacy Med Name: Rybelsus Oral Tablet 14 MG] 90 tablet 0     Sig: TAKE 1 TABLET BY MOUTH EVERY DAY IN THE MORNING BEFORE BREAKFAST       Last Visit Date (If Applicable):  8/1/2024    Next Visit Date:    11/4/2024

## 2024-09-12 ENCOUNTER — OFFICE VISIT (OUTPATIENT)
Dept: GASTROENTEROLOGY | Age: 69
End: 2024-09-12

## 2024-09-12 VITALS
HEIGHT: 68 IN | WEIGHT: 248 LBS | TEMPERATURE: 97.1 F | HEART RATE: 80 BPM | BODY MASS INDEX: 37.59 KG/M2 | DIASTOLIC BLOOD PRESSURE: 82 MMHG | SYSTOLIC BLOOD PRESSURE: 134 MMHG

## 2024-09-12 DIAGNOSIS — Z86.19 HISTORY OF HEPATITIS C: Primary | ICD-10-CM

## 2024-09-20 ENCOUNTER — HOSPITAL ENCOUNTER (OUTPATIENT)
Dept: ULTRASOUND IMAGING | Age: 69
Discharge: HOME | End: 2024-09-22
Attending: INTERNAL MEDICINE
Payer: MEDICARE

## 2024-09-20 DIAGNOSIS — Z86.19 HISTORY OF HEPATITIS C: ICD-10-CM

## 2024-09-20 PROCEDURE — 76705 ECHO EXAM OF ABDOMEN: CPT

## 2024-10-16 DIAGNOSIS — E11.22 TYPE 2 DIABETES MELLITUS WITH STAGE 3A CHRONIC KIDNEY DISEASE, WITH LONG-TERM CURRENT USE OF INSULIN (HCC): ICD-10-CM

## 2024-10-16 DIAGNOSIS — N18.31 TYPE 2 DIABETES MELLITUS WITH STAGE 3A CHRONIC KIDNEY DISEASE, WITH LONG-TERM CURRENT USE OF INSULIN (HCC): ICD-10-CM

## 2024-10-16 DIAGNOSIS — Z79.4 TYPE 2 DIABETES MELLITUS WITH STAGE 3A CHRONIC KIDNEY DISEASE, WITH LONG-TERM CURRENT USE OF INSULIN (HCC): ICD-10-CM

## 2024-10-16 RX ORDER — PEN NEEDLE, DIABETIC 32GX 5/32"
NEEDLE, DISPOSABLE MISCELLANEOUS
Qty: 100 EACH | Refills: 11 | Status: SHIPPED | OUTPATIENT
Start: 2024-10-16

## 2024-10-16 NOTE — TELEPHONE ENCOUNTER
Buck Sloan is calling to request a refill on the following medication(s):    Medication Request:  Requested Prescriptions     Pending Prescriptions Disp Refills    Insulin Pen Needle (BD PEN NEEDLE TERESITA 2ND GEN) 32G X 4 MM MISC [Pharmacy Med Name: BD Pen Needle Teresita 2nd Gen Miscellaneous 32G X 4 MM] 100 each 11     Sig: USE WITH INSULIN FOUR TIMES DAILY       Last Visit Date (If Applicable):  8/1/2024    Next Visit Date:    11/4/2024

## 2024-11-04 ENCOUNTER — OFFICE VISIT (OUTPATIENT)
Dept: FAMILY MEDICINE CLINIC | Age: 69
End: 2024-11-04

## 2024-11-04 VITALS
HEIGHT: 68 IN | WEIGHT: 251 LBS | HEART RATE: 86 BPM | DIASTOLIC BLOOD PRESSURE: 72 MMHG | BODY MASS INDEX: 38.04 KG/M2 | SYSTOLIC BLOOD PRESSURE: 110 MMHG | OXYGEN SATURATION: 95 %

## 2024-11-04 DIAGNOSIS — I10 ESSENTIAL HYPERTENSION: ICD-10-CM

## 2024-11-04 DIAGNOSIS — Z23 IMMUNIZATION DUE: ICD-10-CM

## 2024-11-04 DIAGNOSIS — E11.22 TYPE 2 DIABETES MELLITUS WITH STAGE 3A CHRONIC KIDNEY DISEASE, WITH LONG-TERM CURRENT USE OF INSULIN (HCC): Primary | ICD-10-CM

## 2024-11-04 DIAGNOSIS — N18.31 TYPE 2 DIABETES MELLITUS WITH STAGE 3A CHRONIC KIDNEY DISEASE, WITH LONG-TERM CURRENT USE OF INSULIN (HCC): Primary | ICD-10-CM

## 2024-11-04 DIAGNOSIS — Z79.4 TYPE 2 DIABETES MELLITUS WITH STAGE 3A CHRONIC KIDNEY DISEASE, WITH LONG-TERM CURRENT USE OF INSULIN (HCC): Primary | ICD-10-CM

## 2024-11-04 LAB — HBA1C MFR BLD: 6.5 %

## 2024-11-04 RX ORDER — CINACALCET 30 MG/1
30 TABLET, FILM COATED ORAL DAILY
Qty: 90 TABLET | Refills: 0 | Status: CANCELLED | OUTPATIENT
Start: 2024-11-04

## 2024-11-04 ASSESSMENT — ENCOUNTER SYMPTOMS
VOMITING: 0
DIARRHEA: 0
WHEEZING: 0
SORE THROAT: 0
CONSTIPATION: 0
ABDOMINAL PAIN: 0
SHORTNESS OF BREATH: 0
COUGH: 0
CHEST TIGHTNESS: 0
EYE DISCHARGE: 0
NAUSEA: 0

## 2024-11-04 NOTE — PROGRESS NOTES
09/01/2024    Hepatitis A vaccine (1 of 2 - Risk 2-dose series) 01/29/2025 (Originally 3/26/1974)    Hepatitis B vaccine (1 of 3 - Risk 3-dose series) 04/29/2025 (Originally 3/26/2017)    Shingles vaccine (1 of 2) 04/29/2025 (Originally 3/26/2007)    Lung Cancer Screening &/or Counseling  03/04/2025    Diabetic retinal exam  06/26/2025    Diabetic Alb to Cr ratio (uACR) test  07/22/2025    Lipids  07/22/2025    GFR test (Diabetes, CKD 3-4, OR last GFR 15-59)  07/22/2025    Depression Screen  08/01/2025    Annual Wellness Visit (Medicare)  08/02/2025    A1C test (Diabetic or Prediabetic)  11/04/2025    DTaP/Tdap/Td vaccine (2 - Td or Tdap) 07/06/2028    Flu vaccine  Completed    Pneumococcal 65+ years Vaccine  Completed    AAA screen  Completed    Hib vaccine  Aged Out    Polio vaccine  Aged Out    Meningococcal (ACWY) vaccine  Aged Out    Pneumococcal 0-64 years Vaccine  Discontinued    HIV screen  Discontinued    Prostate Specific Antigen (PSA) Screening or Monitoring  Discontinued       Subjective:     Review of Systems   Constitutional:  Negative for appetite change, fatigue and fever.   HENT:  Negative for congestion, ear pain, hearing loss and sore throat.    Eyes:  Negative for discharge and visual disturbance.   Respiratory:  Negative for cough, chest tightness, shortness of breath and wheezing.    Cardiovascular:  Negative for chest pain, palpitations and leg swelling.   Gastrointestinal:  Negative for abdominal pain, constipation, diarrhea, nausea and vomiting.   Genitourinary:  Negative for flank pain, frequency, hematuria and urgency.   Musculoskeletal:  Negative for arthralgias, gait problem, joint swelling and myalgias.   Skin: Negative.    Neurological:  Negative for dizziness, weakness, numbness and headaches.   Psychiatric/Behavioral: Negative.  Negative for dysphoric mood. The patient is not nervous/anxious.        Objective:     Physical Exam  Vitals reviewed.   Constitutional:       Appearance:

## 2024-11-11 DIAGNOSIS — Z79.4 TYPE 2 DIABETES MELLITUS WITHOUT COMPLICATION, WITH LONG-TERM CURRENT USE OF INSULIN (HCC): ICD-10-CM

## 2024-11-11 DIAGNOSIS — E11.9 TYPE 2 DIABETES MELLITUS WITHOUT COMPLICATION, WITH LONG-TERM CURRENT USE OF INSULIN (HCC): ICD-10-CM

## 2024-11-11 RX ORDER — INSULIN GLARGINE 100 [IU]/ML
INJECTION, SOLUTION SUBCUTANEOUS
Qty: 15 ML | Refills: 0 | Status: SHIPPED | OUTPATIENT
Start: 2024-11-11

## 2024-11-11 NOTE — TELEPHONE ENCOUNTER
Buck Sloan is calling to request a refill on the following medication(s):    Medication Request:  Requested Prescriptions     Pending Prescriptions Disp Refills    LANTUS SOLOSTAR 100 UNIT/ML injection pen [Pharmacy Med Name: Lantus SoloStar Subcutaneous Solution Pen-injector 100 UNIT/ML] 15 mL 0     Sig: INJECT 20 UNITS UNDER THE SKIN 2 TIMES DAILY       Last Visit Date (If Applicable):  11/4/2024    Next Visit Date:    2/4/2025

## 2024-11-24 DIAGNOSIS — N18.31 TYPE 2 DIABETES MELLITUS WITH STAGE 3A CHRONIC KIDNEY DISEASE, WITH LONG-TERM CURRENT USE OF INSULIN (HCC): ICD-10-CM

## 2024-11-24 DIAGNOSIS — E11.22 TYPE 2 DIABETES MELLITUS WITH STAGE 3A CHRONIC KIDNEY DISEASE, WITH LONG-TERM CURRENT USE OF INSULIN (HCC): ICD-10-CM

## 2024-11-24 DIAGNOSIS — Z79.4 TYPE 2 DIABETES MELLITUS WITH STAGE 3A CHRONIC KIDNEY DISEASE, WITH LONG-TERM CURRENT USE OF INSULIN (HCC): ICD-10-CM

## 2024-11-25 RX ORDER — ORAL SEMAGLUTIDE 14 MG/1
TABLET ORAL
Qty: 90 TABLET | Refills: 1 | Status: SHIPPED | OUTPATIENT
Start: 2024-11-25

## 2024-11-25 NOTE — TELEPHONE ENCOUNTER
Buck Sloan is calling to request a refill on the following medication(s):    Medication Request:  Requested Prescriptions     Pending Prescriptions Disp Refills    RYBELSUS 14 MG TABS [Pharmacy Med Name: Rybelsus Oral Tablet 14 MG] 90 tablet 0     Sig: TAKE 1 TABLET BY MOUTH EVERY DAY IN THE MORNING BEFORE BREAKFAST       Last Visit Date (If Applicable):  11/4/2024    Next Visit Date:    2/4/2025

## 2024-12-02 RX ORDER — ATORVASTATIN CALCIUM 20 MG/1
TABLET, FILM COATED ORAL
Qty: 90 TABLET | Refills: 1 | Status: SHIPPED | OUTPATIENT
Start: 2024-12-02

## 2024-12-02 NOTE — TELEPHONE ENCOUNTER
Requested Prescriptions     Pending Prescriptions Disp Refills    atorvastatin (LIPITOR) 20 MG tablet [Pharmacy Med Name: Atorvastatin Calcium Oral Tablet 20 MG] 90 tablet 0     Sig: TAKE 1 TABLET BY MOUTH EVERY DAY

## 2024-12-18 ENCOUNTER — HOSPITAL ENCOUNTER (OUTPATIENT)
Age: 69
Setting detail: SPECIMEN
Discharge: HOME | End: 2024-12-18

## 2024-12-18 DIAGNOSIS — N18.31 STAGE 3A CHRONIC KIDNEY DISEASE (HCC): ICD-10-CM

## 2024-12-18 LAB
25(OH)D3 SERPL-MCNC: 33.7 NG/ML (ref 30–100)
ANION GAP SERPL CALCULATED.3IONS-SCNC: 14 MMOL/L (ref 9–16)
BASOPHILS # BLD: 0.07 K/UL (ref 0–0.2)
BASOPHILS NFR BLD: 1 % (ref 0–2)
BUN SERPL-MCNC: 17 MG/DL (ref 8–23)
CA-I BLD-SCNC: 1.06 MMOL/L (ref 1.13–1.33)
CALCIUM SERPL-MCNC: 9 MG/DL (ref 8.6–10.4)
CHLORIDE SERPL-SCNC: 102 MMOL/L (ref 98–107)
CO2 SERPL-SCNC: 23 MMOL/L (ref 20–31)
CREAT SERPL-MCNC: 1.2 MG/DL (ref 0.7–1.2)
CREAT UR-MCNC: 74.9 MG/DL (ref 39–259)
EOSINOPHIL # BLD: 0.04 K/UL (ref 0–0.44)
EOSINOPHILS RELATIVE PERCENT: 1 % (ref 1–4)
ERYTHROCYTE [DISTWIDTH] IN BLOOD BY AUTOMATED COUNT: 13.8 % (ref 11.8–14.4)
GFR, ESTIMATED: 66 ML/MIN/1.73M2
GLUCOSE SERPL-MCNC: 131 MG/DL (ref 74–99)
HCT VFR BLD AUTO: 54.2 % (ref 40.7–50.3)
HGB BLD-MCNC: 17.8 G/DL (ref 13–17)
IMM GRANULOCYTES # BLD AUTO: 0.04 K/UL (ref 0–0.3)
IMM GRANULOCYTES NFR BLD: 1 %
LYMPHOCYTES NFR BLD: 2.44 K/UL (ref 1.1–3.7)
LYMPHOCYTES RELATIVE PERCENT: 33 % (ref 24–43)
MCH RBC QN AUTO: 29.4 PG (ref 25.2–33.5)
MCHC RBC AUTO-ENTMCNC: 32.8 G/DL (ref 28.4–34.8)
MCV RBC AUTO: 89.6 FL (ref 82.6–102.9)
MONOCYTES NFR BLD: 0.67 K/UL (ref 0.1–1.2)
MONOCYTES NFR BLD: 9 % (ref 3–12)
NEUTROPHILS NFR BLD: 55 % (ref 36–65)
NEUTS SEG NFR BLD: 4.12 K/UL (ref 1.5–8.1)
NRBC BLD-RTO: 0 PER 100 WBC
PHOSPHATE SERPL-MCNC: 3.9 MG/DL (ref 2.5–4.5)
PLATELET # BLD AUTO: 226 K/UL (ref 138–453)
PMV BLD AUTO: 10.3 FL (ref 8.1–13.5)
POTASSIUM SERPL-SCNC: 4.2 MMOL/L (ref 3.7–5.3)
PTH-INTACT SERPL-MCNC: 17 PG/ML (ref 15–65)
RBC # BLD AUTO: 6.05 M/UL (ref 4.21–5.77)
SODIUM SERPL-SCNC: 139 MMOL/L (ref 136–145)
TOTAL PROTEIN, URINE: 17 MG/DL
URINE TOTAL PROTEIN CREATININE RATIO: 0.23 (ref 0–0.2)
WBC OTHER # BLD: 7.4 K/UL (ref 3.5–11.3)

## 2024-12-24 DIAGNOSIS — Z79.4 TYPE 2 DIABETES MELLITUS WITHOUT COMPLICATION, WITH LONG-TERM CURRENT USE OF INSULIN (HCC): ICD-10-CM

## 2024-12-24 DIAGNOSIS — E11.9 TYPE 2 DIABETES MELLITUS WITHOUT COMPLICATION, WITH LONG-TERM CURRENT USE OF INSULIN (HCC): ICD-10-CM

## 2024-12-24 RX ORDER — DAPAGLIFLOZIN 10 MG/1
TABLET, FILM COATED ORAL
Qty: 90 TABLET | Refills: 0 | Status: SHIPPED | OUTPATIENT
Start: 2024-12-24

## 2024-12-24 NOTE — TELEPHONE ENCOUNTER
Buck Sloan is calling to request a refill on the following medication(s):    Medication Request:  Requested Prescriptions     Pending Prescriptions Disp Refills    FARXIGA 10 MG tablet [Pharmacy Med Name: Farxiga Oral Tablet 10 MG] 90 tablet 0     Sig: TAKE 1 TABLET BY MOUTH IN THE MORNING       Last Visit Date (If Applicable):  11/4/2024    Next Visit Date:    2/4/2025

## 2025-01-03 ENCOUNTER — OFFICE VISIT (OUTPATIENT)
Dept: FAMILY MEDICINE CLINIC | Age: 70
End: 2025-01-03

## 2025-01-03 VITALS
RESPIRATION RATE: 16 BRPM | HEART RATE: 86 BPM | TEMPERATURE: 98.3 F | WEIGHT: 253 LBS | OXYGEN SATURATION: 94 % | BODY MASS INDEX: 38.47 KG/M2 | SYSTOLIC BLOOD PRESSURE: 110 MMHG | DIASTOLIC BLOOD PRESSURE: 80 MMHG

## 2025-01-03 DIAGNOSIS — K74.69 OTHER CIRRHOSIS OF LIVER (HCC): ICD-10-CM

## 2025-01-03 DIAGNOSIS — I50.32 CHRONIC DIASTOLIC HEART FAILURE (HCC): ICD-10-CM

## 2025-01-03 DIAGNOSIS — N18.31 TYPE 2 DIABETES MELLITUS WITH STAGE 3A CHRONIC KIDNEY DISEASE, WITH LONG-TERM CURRENT USE OF INSULIN (HCC): ICD-10-CM

## 2025-01-03 DIAGNOSIS — E11.22 TYPE 2 DIABETES MELLITUS WITH STAGE 3A CHRONIC KIDNEY DISEASE, WITH LONG-TERM CURRENT USE OF INSULIN (HCC): ICD-10-CM

## 2025-01-03 DIAGNOSIS — J06.9 VIRAL URI: Primary | ICD-10-CM

## 2025-01-03 DIAGNOSIS — Z79.4 TYPE 2 DIABETES MELLITUS WITH STAGE 3A CHRONIC KIDNEY DISEASE, WITH LONG-TERM CURRENT USE OF INSULIN (HCC): ICD-10-CM

## 2025-01-03 RX ORDER — ALBUTEROL SULFATE 90 UG/1
2 INHALANT RESPIRATORY (INHALATION) 4 TIMES DAILY PRN
Qty: 18 G | Refills: 0 | Status: SHIPPED | OUTPATIENT
Start: 2025-01-03

## 2025-01-03 RX ORDER — GUAIFENESIN AND DEXTROMETHORPHAN HYDROBROMIDE 1200; 60 MG/1; MG/1
1 TABLET, EXTENDED RELEASE ORAL 2 TIMES DAILY
Qty: 20 TABLET | Refills: 0 | Status: SHIPPED | OUTPATIENT
Start: 2025-01-03

## 2025-01-03 RX ORDER — AZITHROMYCIN 250 MG/1
TABLET, FILM COATED ORAL
Qty: 6 TABLET | Refills: 0 | Status: SHIPPED | OUTPATIENT
Start: 2025-01-03 | End: 2025-01-13

## 2025-01-03 ASSESSMENT — PATIENT HEALTH QUESTIONNAIRE - PHQ9
SUM OF ALL RESPONSES TO PHQ9 QUESTIONS 1 & 2: 0
1. LITTLE INTEREST OR PLEASURE IN DOING THINGS: NOT AT ALL
SUM OF ALL RESPONSES TO PHQ QUESTIONS 1-9: 0
2. FEELING DOWN, DEPRESSED OR HOPELESS: NOT AT ALL
SUM OF ALL RESPONSES TO PHQ QUESTIONS 1-9: 0

## 2025-01-03 ASSESSMENT — ENCOUNTER SYMPTOMS
ABDOMINAL PAIN: 0
RHINORRHEA: 1
CHEST TIGHTNESS: 1
DIARRHEA: 0
SORE THROAT: 1
NAUSEA: 1
COUGH: 1
EYE DISCHARGE: 0
SHORTNESS OF BREATH: 0
VOMITING: 0
WHEEZING: 0
CONSTIPATION: 0

## 2025-01-03 NOTE — PROGRESS NOTES
[facility]  [address]  [address]  [facility] [place] [address]  Dept: [phone]  Dept Fax: [phone]    Buck Sloan is a 69 y.o. male who is a Established patient, who presents today for his medical conditions/complaints as noted below:  Chief Complaint   Patient presents with    Cough    Sore Throat    Chest Congestion     Onset Monday no fever    Chills    Generalized Body Aches                HPI:     HPI  History of Present Illness  The patient presents for evaluation of a cold.    He has been experiencing symptoms consistent with a severe cold, including chest congestion, chills, and body aches. He reports pain in his chest and throat when coughing, which has improved but not completely resolved. He continues to experience chills but has not had a fever. He does not report any vomiting but admits to occasional nausea, particularly when coughing up phlegm. He does not report any shortness of breath or wheezing but does report chest tightness and significant congestion. He expresses concern about the possibility of pneumonia. He also reports fatigue, decreased appetite, and increased sleep duration. He does not report any headaches, dizziness, or lightheadedness. He experienced a sore throat on Monday and Tuesday, which has since improved. He does not report any ear pain but does report a runny nose. He typically does not fall ill during seasonal changes and did not experience any illness last winter. He describes his throat as scratchy and his chest as congested. He has been producing a significant amount of phlegm, which has improved over the past few days but has not completely resolved. He has experienced a few episodes of severe coughing this week, leading to shortness of breath. He has been managing his symptoms with Tequila aspirin, which he reports as beneficial.    He has a history of stage 1 liver fibrosis and is under the care of a gastroenterologist. He has

## 2025-01-16 DIAGNOSIS — E11.9 TYPE 2 DIABETES MELLITUS WITHOUT COMPLICATION, WITH LONG-TERM CURRENT USE OF INSULIN (HCC): ICD-10-CM

## 2025-01-16 DIAGNOSIS — Z79.4 TYPE 2 DIABETES MELLITUS WITHOUT COMPLICATION, WITH LONG-TERM CURRENT USE OF INSULIN (HCC): ICD-10-CM

## 2025-01-16 NOTE — TELEPHONE ENCOUNTER
Buck Sloan is calling to request a refill on the following medication(s):    Medication Request:  Requested Prescriptions     Pending Prescriptions Disp Refills    LANTUS SOLOSTAR 100 UNIT/ML injection pen [Pharmacy Med Name: Lantus SoloStar Subcutaneous Solution Pen-injector 100 UNIT/ML] 15 mL 0     Sig: INJECT 20 UNITS UNDER THE SKIN 2 TIMES DAILY       Last Visit Date (If Applicable):  1/3/2025    Next Visit Date:    2/4/2025

## 2025-01-17 RX ORDER — INSULIN GLARGINE 100 [IU]/ML
INJECTION, SOLUTION SUBCUTANEOUS
Qty: 15 ML | Refills: 1 | Status: SHIPPED | OUTPATIENT
Start: 2025-01-17

## 2025-02-03 DIAGNOSIS — I10 ESSENTIAL HYPERTENSION: ICD-10-CM

## 2025-02-03 RX ORDER — METOPROLOL TARTRATE 25 MG/1
25 TABLET, FILM COATED ORAL 2 TIMES DAILY
Qty: 180 TABLET | Refills: 0 | Status: SHIPPED | OUTPATIENT
Start: 2025-02-03 | End: 2025-02-04 | Stop reason: SDUPTHER

## 2025-02-03 NOTE — TELEPHONE ENCOUNTER
Buck Sloan is calling to request a refill on the following medication(s):    Medication Request:  Requested Prescriptions     Pending Prescriptions Disp Refills    metoprolol tartrate (LOPRESSOR) 25 MG tablet [Pharmacy Med Name: Metoprolol Tartrate Oral Tablet 25 MG] 180 tablet 0     Sig: TAKE 1 TABLET BY MOUTH 2 TIMES A DAY       Last Visit Date (If Applicable):  1/3/2025    Next Visit Date:    2/4/2025

## 2025-02-04 ENCOUNTER — OFFICE VISIT (OUTPATIENT)
Dept: FAMILY MEDICINE CLINIC | Age: 70
End: 2025-02-04

## 2025-02-04 VITALS
WEIGHT: 283 LBS | BODY MASS INDEX: 42.89 KG/M2 | HEIGHT: 68 IN | HEART RATE: 96 BPM | DIASTOLIC BLOOD PRESSURE: 70 MMHG | OXYGEN SATURATION: 96 % | SYSTOLIC BLOOD PRESSURE: 111 MMHG

## 2025-02-04 DIAGNOSIS — I10 ESSENTIAL HYPERTENSION: ICD-10-CM

## 2025-02-04 DIAGNOSIS — E66.01 MORBID (SEVERE) OBESITY DUE TO EXCESS CALORIES: ICD-10-CM

## 2025-02-04 DIAGNOSIS — Z79.4 TYPE 2 DIABETES MELLITUS WITH STAGE 3A CHRONIC KIDNEY DISEASE, WITH LONG-TERM CURRENT USE OF INSULIN (HCC): Primary | ICD-10-CM

## 2025-02-04 DIAGNOSIS — E11.22 TYPE 2 DIABETES MELLITUS WITH STAGE 3A CHRONIC KIDNEY DISEASE, WITH LONG-TERM CURRENT USE OF INSULIN (HCC): Primary | ICD-10-CM

## 2025-02-04 DIAGNOSIS — N18.31 TYPE 2 DIABETES MELLITUS WITH STAGE 3A CHRONIC KIDNEY DISEASE, WITH LONG-TERM CURRENT USE OF INSULIN (HCC): Primary | ICD-10-CM

## 2025-02-04 LAB — HBA1C MFR BLD: 6.9 %

## 2025-02-04 RX ORDER — METOPROLOL TARTRATE 25 MG/1
25 TABLET, FILM COATED ORAL 2 TIMES DAILY
Qty: 180 TABLET | Refills: 1 | Status: SHIPPED | OUTPATIENT
Start: 2025-02-04

## 2025-02-04 RX ORDER — ORAL SEMAGLUTIDE 14 MG/1
14 TABLET ORAL
Qty: 90 TABLET | Refills: 1 | Status: SHIPPED | OUTPATIENT
Start: 2025-02-04

## 2025-02-04 SDOH — ECONOMIC STABILITY: FOOD INSECURITY: WITHIN THE PAST 12 MONTHS, THE FOOD YOU BOUGHT JUST DIDN'T LAST AND YOU DIDN'T HAVE MONEY TO GET MORE.: NEVER TRUE

## 2025-02-04 SDOH — ECONOMIC STABILITY: FOOD INSECURITY: WITHIN THE PAST 12 MONTHS, YOU WORRIED THAT YOUR FOOD WOULD RUN OUT BEFORE YOU GOT MONEY TO BUY MORE.: NEVER TRUE

## 2025-02-04 ASSESSMENT — ENCOUNTER SYMPTOMS
EYE DISCHARGE: 0
WHEEZING: 0
CONSTIPATION: 0
CHEST TIGHTNESS: 0
SHORTNESS OF BREATH: 0
NAUSEA: 0
SORE THROAT: 0
DIARRHEA: 0
ABDOMINAL PAIN: 0
PHOTOPHOBIA: 1
VOMITING: 0
COUGH: 0

## 2025-02-04 NOTE — PROGRESS NOTES
[facility]  [address]  [address]  [facility] [place] [address]  Dept: [phone]  Dept Fax: [phone]    Buck Sloan is a 69 y.o. male who is a Established patient, who presents today for his medical conditions/complaints as noted below:  Chief Complaint   Patient presents with    Diabetes    Hypertension    Medication Check         HPI:     HPI  History of Present Illness  He is here today to follow-up on diabetes and hypertension.    He reports experiencing blurry vision in his left eye, which he attributes to an incident a few years ago when his eyeball became immobile, pointing towards his nose, resulting in diplopia. Despite undergoing extensive testing at [facility] for 5 hours and subsequent ophthalmological evaluations, no definitive diagnosis was made. He was advised that the condition might resolve spontaneously, which it did after a period from mid-May to mid-August. Recently, he has been experiencing persistent blurriness in his vision, rendering him unable to read text on the television screen. He also reports photophobia, necessitating the use of sunglasses outdoors. His visual acuity is satisfactory when using either eye individually, but he experiences significant blurriness and lack of focus when both eyes are open. He reports mild pain in his left eye. He has an upcoming appointment with his ophthalmologist next week. He has a known diagnosis of cataracts, but surgical intervention has been deferred due to incomplete growth and potential for recurrence. He has been informed that his left eye is weaker than his right. He does not report any recent trauma or excessive rubbing of the eye. He possesses prescription glasses but does not use them regularly as his vision was previously deemed acceptable. He speculates that his current symptoms may be due to a need for a stronger prescription. He has been under the care of an ophthalmologist in [place]

## 2025-02-14 ENCOUNTER — OFFICE VISIT (OUTPATIENT)
Dept: ONCOLOGY | Age: 70
End: 2025-02-14
Payer: MEDICARE

## 2025-02-14 ENCOUNTER — TELEPHONE (OUTPATIENT)
Dept: ONCOLOGY | Age: 70
End: 2025-02-14

## 2025-02-14 VITALS
WEIGHT: 254 LBS | HEIGHT: 68 IN | HEART RATE: 48 BPM | BODY MASS INDEX: 38.49 KG/M2 | TEMPERATURE: 96.9 F | SYSTOLIC BLOOD PRESSURE: 115 MMHG | RESPIRATION RATE: 16 BRPM | DIASTOLIC BLOOD PRESSURE: 81 MMHG

## 2025-02-14 DIAGNOSIS — D75.1 POLYCYTHEMIA: Primary | ICD-10-CM

## 2025-02-14 PROCEDURE — 1036F TOBACCO NON-USER: CPT | Performed by: INTERNAL MEDICINE

## 2025-02-14 PROCEDURE — 1159F MED LIST DOCD IN RCRD: CPT | Performed by: INTERNAL MEDICINE

## 2025-02-14 PROCEDURE — 99211 OFF/OP EST MAY X REQ PHY/QHP: CPT | Performed by: INTERNAL MEDICINE

## 2025-02-14 PROCEDURE — 3079F DIAST BP 80-89 MM HG: CPT | Performed by: INTERNAL MEDICINE

## 2025-02-14 PROCEDURE — G8427 DOCREV CUR MEDS BY ELIG CLIN: HCPCS | Performed by: INTERNAL MEDICINE

## 2025-02-14 PROCEDURE — 1123F ACP DISCUSS/DSCN MKR DOCD: CPT | Performed by: INTERNAL MEDICINE

## 2025-02-14 PROCEDURE — 1126F AMNT PAIN NOTED NONE PRSNT: CPT | Performed by: INTERNAL MEDICINE

## 2025-02-14 PROCEDURE — 3074F SYST BP LT 130 MM HG: CPT | Performed by: INTERNAL MEDICINE

## 2025-02-14 PROCEDURE — 3017F COLORECTAL CA SCREEN DOC REV: CPT | Performed by: INTERNAL MEDICINE

## 2025-02-14 PROCEDURE — 99214 OFFICE O/P EST MOD 30 MIN: CPT | Performed by: INTERNAL MEDICINE

## 2025-02-14 PROCEDURE — G8417 CALC BMI ABV UP PARAM F/U: HCPCS | Performed by: INTERNAL MEDICINE

## 2025-02-14 NOTE — PROGRESS NOTES
_           Chief Complaint   Patient presents with    Follow-up     Review disease status     DIAGNOSIS:         Polycythemia, reactive polycythemia  History of tobacco abuse.  Quit 7 years ago  Overweight  Probable sleep apnea  Hepatitis C  Mild chronic renal insufficiency  CURRENT THERAPY:         Observation monitoring of polycythemia    BRIEF CASE HISTORY:      Mr. Buck Sloan is a very pleasant 69 y.o. male with history of multiple co morbidities as listed.  The patient is referred for evaluation and management of polycythemia.  The patient was noted to have elevation of red blood cells over the last few lab tests.  Patient denies any headaches or dizziness.  No TIA or CVA-like symptoms.  No chest pain or anginal symptoms.  The patient had history of mild chronic renal insufficiency.  He has history of diabetes and liver disease.  He had history of hepatitis C on treatment.  He is overweight.  The patient managed to quit smoking 7 years ago.  Used to smoke 1 pack/day for about 20 years.  Denies alcohol drinking..     INTERIM HISTORY:   Patient seen for follow-up polycythemia.  He is clinically stable.  No chest pain or anginal symptoms.  No TIA or CVA-like symptoms.  No headaches.  No fever or infections.  No other complaints.    PAST MEDICAL HISTORY: has a past medical history of Hepatitis C, Hypertension, Liver disease, Non-rheumatic mitral regurgitation, Prediabetes, Renal insufficiency, Severe obesity (BMI 35.0-39.9), and Ventricular tachycardia (HCC).    PAST SURGICAL HISTORY: has no past surgical history on file.     CURRENT MEDICATIONS:  has a current medication list which includes the following prescription(s): metoprolol tartrate, rybelsus, cinacalcet, lantus solostar, albuterol sulfate hfa, farxiga, atorvastatin, bd pen needle shruthi 2nd gen, freestyle lite, and freestyle lancets.    ALLERGIES:  has No Known

## 2025-02-14 NOTE — TELEPHONE ENCOUNTER
Instructions   from Dr. Bridgette Jones MD    RV 6 months with CBC at       RV with CBC 8/15/25 at 10:15 am

## 2025-03-13 ENCOUNTER — OFFICE VISIT (OUTPATIENT)
Dept: GASTROENTEROLOGY | Age: 70
End: 2025-03-13
Payer: MEDICARE

## 2025-03-13 VITALS
DIASTOLIC BLOOD PRESSURE: 64 MMHG | WEIGHT: 253 LBS | HEART RATE: 41 BPM | SYSTOLIC BLOOD PRESSURE: 111 MMHG | BODY MASS INDEX: 38.47 KG/M2

## 2025-03-13 DIAGNOSIS — K76.0 FATTY LIVER: Primary | ICD-10-CM

## 2025-03-13 PROCEDURE — 1159F MED LIST DOCD IN RCRD: CPT | Performed by: INTERNAL MEDICINE

## 2025-03-13 PROCEDURE — 3017F COLORECTAL CA SCREEN DOC REV: CPT | Performed by: INTERNAL MEDICINE

## 2025-03-13 PROCEDURE — G8417 CALC BMI ABV UP PARAM F/U: HCPCS | Performed by: INTERNAL MEDICINE

## 2025-03-13 PROCEDURE — 99214 OFFICE O/P EST MOD 30 MIN: CPT | Performed by: INTERNAL MEDICINE

## 2025-03-13 PROCEDURE — 3074F SYST BP LT 130 MM HG: CPT | Performed by: INTERNAL MEDICINE

## 2025-03-13 PROCEDURE — G8427 DOCREV CUR MEDS BY ELIG CLIN: HCPCS | Performed by: INTERNAL MEDICINE

## 2025-03-13 PROCEDURE — 1036F TOBACCO NON-USER: CPT | Performed by: INTERNAL MEDICINE

## 2025-03-13 PROCEDURE — 3078F DIAST BP <80 MM HG: CPT | Performed by: INTERNAL MEDICINE

## 2025-03-13 PROCEDURE — 1123F ACP DISCUSS/DSCN MKR DOCD: CPT | Performed by: INTERNAL MEDICINE

## 2025-03-13 NOTE — PROGRESS NOTES
of fibrosis.    1. Ultrasound of the liver is suggestive of fibrosis. Gallstones are present. 2. Elastography suggests moderate to severe fibrosis. Electronically signed: Hamilton Ortiz.         PHYSICAL EXAMINATION: Vital signs reviewed per the nursing documentation.     Wt 114.8 kg (253 lb)   BMI 38.47 kg/m²   Body mass index is 38.47 kg/m².   Physical Exam  Constitutional:       Appearance: Normal appearance.   HENT:      Head: Normocephalic.      Mouth/Throat:      Mouth: Mucous membranes are moist.   Eyes:      General: No scleral icterus.  Cardiovascular:      Rate and Rhythm: Normal rate and regular rhythm.      Pulses: Normal pulses.      Heart sounds: Normal heart sounds. No murmur heard.     No gallop.   Pulmonary:      Effort: Pulmonary effort is normal. No respiratory distress.      Breath sounds: Normal breath sounds. No stridor. No wheezing or rales.   Abdominal:      General: Abdomen is flat. There is no distension.      Palpations: Abdomen is soft. There is no mass.      Tenderness: There is no abdominal tenderness.   Musculoskeletal:      Cervical back: Neck supple.   Neurological:      Mental Status: He is alert and oriented to person, place, and time.           IMPRESSION: Mr. Sloan is a 69 y.o. male with h/o- Hep C S/P treatment with SVR  Also has fatty liver with fibrosis (F3-F4).    Plan- Check LFT's every 3-4 months    Advised to make lifestyle changes- low carb (especially no refined carbs) diet, high protein, low fat diet, daily exercises, dietary calorie restriction for weight loss, avoid OTC medications, avoid pop drinks, fruit juices      Medication where reviewed, side effects from the GI medication were reviewed with the patient  We did refill the GI medications            Diet/life style/natural hx /complication of the dx were all explained in details   Past medical, past surgical, social history, psychiatric history, medications or allergies, all reviewed and  updated    Thank you

## 2025-03-24 DIAGNOSIS — Z79.4 TYPE 2 DIABETES MELLITUS WITHOUT COMPLICATION, WITH LONG-TERM CURRENT USE OF INSULIN: ICD-10-CM

## 2025-03-24 DIAGNOSIS — E11.9 TYPE 2 DIABETES MELLITUS WITHOUT COMPLICATION, WITH LONG-TERM CURRENT USE OF INSULIN: ICD-10-CM

## 2025-03-25 RX ORDER — DAPAGLIFLOZIN 10 MG/1
10 TABLET, FILM COATED ORAL EVERY MORNING
Qty: 90 TABLET | Refills: 1 | Status: SHIPPED | OUTPATIENT
Start: 2025-03-25

## 2025-05-06 ENCOUNTER — OFFICE VISIT (OUTPATIENT)
Dept: FAMILY MEDICINE CLINIC | Age: 70
End: 2025-05-06

## 2025-05-06 VITALS
DIASTOLIC BLOOD PRESSURE: 82 MMHG | OXYGEN SATURATION: 97 % | BODY MASS INDEX: 38.95 KG/M2 | HEIGHT: 68 IN | SYSTOLIC BLOOD PRESSURE: 126 MMHG | HEART RATE: 83 BPM | WEIGHT: 257 LBS

## 2025-05-06 DIAGNOSIS — Z87.891 PERSONAL HISTORY OF TOBACCO USE: ICD-10-CM

## 2025-05-06 DIAGNOSIS — Z12.5 PROSTATE CANCER SCREENING: ICD-10-CM

## 2025-05-06 DIAGNOSIS — I10 ESSENTIAL HYPERTENSION: ICD-10-CM

## 2025-05-06 DIAGNOSIS — N18.31 TYPE 2 DIABETES MELLITUS WITH STAGE 3A CHRONIC KIDNEY DISEASE, WITH LONG-TERM CURRENT USE OF INSULIN (HCC): Primary | ICD-10-CM

## 2025-05-06 DIAGNOSIS — Z79.4 TYPE 2 DIABETES MELLITUS WITH STAGE 3A CHRONIC KIDNEY DISEASE, WITH LONG-TERM CURRENT USE OF INSULIN (HCC): Primary | ICD-10-CM

## 2025-05-06 DIAGNOSIS — E11.22 TYPE 2 DIABETES MELLITUS WITH STAGE 3A CHRONIC KIDNEY DISEASE, WITH LONG-TERM CURRENT USE OF INSULIN (HCC): Primary | ICD-10-CM

## 2025-05-06 LAB — HBA1C MFR BLD: 7 %

## 2025-05-06 SDOH — ECONOMIC STABILITY: FOOD INSECURITY: WITHIN THE PAST 12 MONTHS, YOU WORRIED THAT YOUR FOOD WOULD RUN OUT BEFORE YOU GOT MONEY TO BUY MORE.: NEVER TRUE

## 2025-05-06 SDOH — ECONOMIC STABILITY: FOOD INSECURITY: WITHIN THE PAST 12 MONTHS, THE FOOD YOU BOUGHT JUST DIDN'T LAST AND YOU DIDN'T HAVE MONEY TO GET MORE.: NEVER TRUE

## 2025-05-06 ASSESSMENT — ENCOUNTER SYMPTOMS
SHORTNESS OF BREATH: 0
COUGH: 0
ABDOMINAL PAIN: 0
SORE THROAT: 0
NAUSEA: 0
CONSTIPATION: 0
VOMITING: 0
EYE DISCHARGE: 0
WHEEZING: 0
DIARRHEA: 0

## 2025-05-06 ASSESSMENT — PATIENT HEALTH QUESTIONNAIRE - PHQ9
SUM OF ALL RESPONSES TO PHQ QUESTIONS 1-9: 0
SUM OF ALL RESPONSES TO PHQ QUESTIONS 1-9: 0
2. FEELING DOWN, DEPRESSED OR HOPELESS: NOT AT ALL
1. LITTLE INTEREST OR PLEASURE IN DOING THINGS: NOT AT ALL
SUM OF ALL RESPONSES TO PHQ QUESTIONS 1-9: 0
SUM OF ALL RESPONSES TO PHQ QUESTIONS 1-9: 0

## 2025-05-06 NOTE — PROGRESS NOTES
[facility]  [address]  [facility] [place] [address]  Dept: [phone]  Dept Fax: [phone]    Buck Sloan is a 70 y.o. male who is a Established patient, who presents today for his medical conditions/complaints as noted below:  Chief Complaint   Patient presents with    Diabetes         HPI:     HPI  History of Present Illness  The patient presents for evaluation of diabetes mellitus, liver fibrosis, and dental issues.    He recently consulted with a gastroenterologist regarding his hepatic condition, which necessitates follow-up visits every 4 months. The gastroenterologist informed him of mild hepatic steatosis, but the overall prognosis was not deemed severe. He was previously diagnosed with stage 1 liver fibrosis, but recent ultrasound findings indicate progression to stage 3 or 4, suggesting moderate to severe fibrosis. The gastroenterologist has not yet initiated pharmacological intervention, pending further evaluation. A follow-up appointment is scheduled for 07/2025.    His dietary habits include minimal red meat consumption and occasional snacking on cookies. He also reports a high intake of milk, consuming several gallons per week. His nephrologist has advised him to monitor his milk intake due to potential renal complications. He has an upcoming appointment with his nephrologist at the end of the current month. He is under the care of a cardiologist, whom he consults annually. His last consultation with the cardiologist was uneventful, with no significant findings reported. He is currently on a regimen of Farxiga, administered daily, and Lantus, administered at a dose of 10 units twice daily. He has continued the use of Rybelsus.    He has expressed reluctance towards undergoing a colonoscopy, despite recommendations from his healthcare providers.    He recently visited an ophthalmologist for a routine eye examination and was prescribed new glasses. He

## 2025-05-19 ENCOUNTER — HOSPITAL ENCOUNTER (OUTPATIENT)
Dept: CT IMAGING | Age: 70
Discharge: HOME | End: 2025-05-21
Payer: MEDICARE

## 2025-05-19 DIAGNOSIS — Z87.891 PERSONAL HISTORY OF TOBACCO USE: ICD-10-CM

## 2025-05-19 PROCEDURE — 71271 CT THORAX LUNG CANCER SCR C-: CPT

## 2025-05-20 ENCOUNTER — RESULTS FOLLOW-UP (OUTPATIENT)
Dept: FAMILY MEDICINE CLINIC | Age: 70
End: 2025-05-20

## 2025-05-21 ENCOUNTER — HOSPITAL ENCOUNTER (OUTPATIENT)
Age: 70
Setting detail: SPECIMEN
Discharge: HOME | End: 2025-05-21

## 2025-05-21 DIAGNOSIS — N18.31 TYPE 2 DIABETES MELLITUS WITH STAGE 3A CHRONIC KIDNEY DISEASE, WITH LONG-TERM CURRENT USE OF INSULIN (HCC): ICD-10-CM

## 2025-05-21 DIAGNOSIS — Z79.4 TYPE 2 DIABETES MELLITUS WITH STAGE 3A CHRONIC KIDNEY DISEASE, WITH LONG-TERM CURRENT USE OF INSULIN (HCC): ICD-10-CM

## 2025-05-21 DIAGNOSIS — I10 ESSENTIAL HYPERTENSION: ICD-10-CM

## 2025-05-21 DIAGNOSIS — E11.22 TYPE 2 DIABETES MELLITUS WITH STAGE 3A CHRONIC KIDNEY DISEASE, WITH LONG-TERM CURRENT USE OF INSULIN (HCC): ICD-10-CM

## 2025-05-21 DIAGNOSIS — N18.31 STAGE 3A CHRONIC KIDNEY DISEASE (HCC): ICD-10-CM

## 2025-05-21 DIAGNOSIS — Z12.5 PROSTATE CANCER SCREENING: ICD-10-CM

## 2025-05-21 LAB
25(OH)D3 SERPL-MCNC: 35.1 NG/ML (ref 30–100)
ALBUMIN SERPL-MCNC: 4.5 G/DL (ref 3.5–5.2)
ALBUMIN/GLOB SERPL: 1.6 {RATIO} (ref 1–2.5)
ALP SERPL-CCNC: 91 U/L (ref 40–129)
ALT SERPL-CCNC: 58 U/L (ref 10–50)
ANION GAP SERPL CALCULATED.3IONS-SCNC: 14 MMOL/L (ref 9–16)
ANION GAP SERPL CALCULATED.3IONS-SCNC: 17 MMOL/L (ref 9–16)
AST SERPL-CCNC: 47 U/L (ref 10–50)
BASOPHILS # BLD: 0.07 K/UL (ref 0–0.2)
BASOPHILS # BLD: 0.07 K/UL (ref 0–0.2)
BASOPHILS NFR BLD: 1 % (ref 0–2)
BASOPHILS NFR BLD: 1 % (ref 0–2)
BILIRUB SERPL-MCNC: 1.3 MG/DL (ref 0–1.2)
BUN SERPL-MCNC: 17 MG/DL (ref 8–23)
BUN SERPL-MCNC: 17 MG/DL (ref 8–23)
CA-I BLD-SCNC: 0.97 MMOL/L (ref 1.13–1.33)
CALCIUM SERPL-MCNC: 8.6 MG/DL (ref 8.6–10.4)
CALCIUM SERPL-MCNC: 8.6 MG/DL (ref 8.6–10.4)
CHLORIDE SERPL-SCNC: 100 MMOL/L (ref 98–107)
CHLORIDE SERPL-SCNC: 99 MMOL/L (ref 98–107)
CHOLEST SERPL-MCNC: 145 MG/DL (ref 0–199)
CHOLESTEROL/HDL RATIO: 4.5
CO2 SERPL-SCNC: 23 MMOL/L (ref 20–31)
CO2 SERPL-SCNC: 25 MMOL/L (ref 20–31)
CREAT SERPL-MCNC: 1.1 MG/DL (ref 0.7–1.2)
CREAT SERPL-MCNC: 1.2 MG/DL (ref 0.7–1.2)
CREAT UR-MCNC: 82.1 MG/DL (ref 39–259)
CREAT UR-MCNC: 82.1 MG/DL (ref 39–259)
EOSINOPHIL # BLD: 0.07 K/UL (ref 0–0.44)
EOSINOPHIL # BLD: 0.07 K/UL (ref 0–0.44)
EOSINOPHILS RELATIVE PERCENT: 1 % (ref 1–4)
EOSINOPHILS RELATIVE PERCENT: 1 % (ref 1–4)
ERYTHROCYTE [DISTWIDTH] IN BLOOD BY AUTOMATED COUNT: 13.8 % (ref 11.8–14.4)
ERYTHROCYTE [DISTWIDTH] IN BLOOD BY AUTOMATED COUNT: 13.8 % (ref 11.8–14.4)
GFR, ESTIMATED: 66 ML/MIN/1.73M2
GFR, ESTIMATED: 73 ML/MIN/1.73M2
GLUCOSE SERPL-MCNC: 164 MG/DL (ref 74–99)
GLUCOSE SERPL-MCNC: 165 MG/DL (ref 74–99)
HCT VFR BLD AUTO: 52.3 % (ref 40.7–50.3)
HCT VFR BLD AUTO: 52.3 % (ref 40.7–50.3)
HDLC SERPL-MCNC: 32 MG/DL
HGB BLD-MCNC: 17.4 G/DL (ref 13–17)
HGB BLD-MCNC: 17.4 G/DL (ref 13–17)
IMM GRANULOCYTES # BLD AUTO: <0.03 K/UL (ref 0–0.3)
IMM GRANULOCYTES # BLD AUTO: <0.03 K/UL (ref 0–0.3)
IMM GRANULOCYTES NFR BLD: 0 %
IMM GRANULOCYTES NFR BLD: 0 %
LDLC SERPL CALC-MCNC: 58 MG/DL (ref 0–100)
LYMPHOCYTES NFR BLD: 1.6 K/UL (ref 1.1–3.7)
LYMPHOCYTES NFR BLD: 1.6 K/UL (ref 1.1–3.7)
LYMPHOCYTES RELATIVE PERCENT: 29 % (ref 24–43)
LYMPHOCYTES RELATIVE PERCENT: 29 % (ref 24–43)
MCH RBC QN AUTO: 29.6 PG (ref 25.2–33.5)
MCH RBC QN AUTO: 29.6 PG (ref 25.2–33.5)
MCHC RBC AUTO-ENTMCNC: 33.3 G/DL (ref 28.4–34.8)
MCHC RBC AUTO-ENTMCNC: 33.3 G/DL (ref 28.4–34.8)
MCV RBC AUTO: 88.9 FL (ref 82.6–102.9)
MCV RBC AUTO: 88.9 FL (ref 82.6–102.9)
MICROALBUMIN UR-MCNC: 60 MG/L (ref 0–20)
MICROALBUMIN/CREAT UR-RTO: 73 MCG/MG CREAT (ref 0–17)
MONOCYTES NFR BLD: 0.51 K/UL (ref 0.1–1.2)
MONOCYTES NFR BLD: 0.51 K/UL (ref 0.1–1.2)
MONOCYTES NFR BLD: 9 % (ref 3–12)
MONOCYTES NFR BLD: 9 % (ref 3–12)
NEUTROPHILS NFR BLD: 59 % (ref 36–65)
NEUTROPHILS NFR BLD: 60 % (ref 36–65)
NEUTS SEG NFR BLD: 3.32 K/UL (ref 1.5–8.1)
NEUTS SEG NFR BLD: 3.32 K/UL (ref 1.5–8.1)
NRBC BLD-RTO: 0 PER 100 WBC
NRBC BLD-RTO: 0 PER 100 WBC
PHOSPHATE SERPL-MCNC: 3.6 MG/DL (ref 2.5–4.5)
PLATELET # BLD AUTO: 203 K/UL (ref 138–453)
PLATELET # BLD AUTO: 203 K/UL (ref 138–453)
PMV BLD AUTO: 9.7 FL (ref 8.1–13.5)
PMV BLD AUTO: 9.7 FL (ref 8.1–13.5)
POTASSIUM SERPL-SCNC: 4 MMOL/L (ref 3.7–5.3)
POTASSIUM SERPL-SCNC: 4.1 MMOL/L (ref 3.7–5.3)
PROT SERPL-MCNC: 7.3 G/DL (ref 6.6–8.7)
PSA SERPL-MCNC: 0.19 NG/ML (ref 0–4)
PTH-INTACT SERPL-MCNC: 23 PG/ML (ref 17.9–58.6)
RBC # BLD AUTO: 5.88 M/UL (ref 4.21–5.77)
RBC # BLD AUTO: 5.88 M/UL (ref 4.21–5.77)
SODIUM SERPL-SCNC: 139 MMOL/L (ref 136–145)
SODIUM SERPL-SCNC: 139 MMOL/L (ref 136–145)
TOTAL PROTEIN, URINE: 17 MG/DL
TRIGL SERPL-MCNC: 276 MG/DL
TSH SERPL DL<=0.05 MIU/L-ACNC: 1.54 UIU/ML (ref 0.27–4.2)
URINE TOTAL PROTEIN CREATININE RATIO: 0.21 (ref 0–0.2)
VLDLC SERPL CALC-MCNC: 55 MG/DL (ref 1–30)
WBC OTHER # BLD: 5.6 K/UL (ref 3.5–11.3)
WBC OTHER # BLD: 5.6 K/UL (ref 3.5–11.3)

## 2025-05-29 DIAGNOSIS — E11.9 TYPE 2 DIABETES MELLITUS WITHOUT COMPLICATION, WITH LONG-TERM CURRENT USE OF INSULIN (HCC): ICD-10-CM

## 2025-05-29 DIAGNOSIS — Z79.4 TYPE 2 DIABETES MELLITUS WITHOUT COMPLICATION, WITH LONG-TERM CURRENT USE OF INSULIN (HCC): ICD-10-CM

## 2025-05-29 RX ORDER — INSULIN GLARGINE 100 [IU]/ML
INJECTION, SOLUTION SUBCUTANEOUS
Qty: 15 ML | Refills: 0 | Status: SHIPPED | OUTPATIENT
Start: 2025-05-29

## 2025-05-29 RX ORDER — ATORVASTATIN CALCIUM 20 MG/1
20 TABLET, FILM COATED ORAL DAILY
Qty: 90 TABLET | Refills: 0 | Status: SHIPPED | OUTPATIENT
Start: 2025-05-29

## 2025-05-29 NOTE — TELEPHONE ENCOUNTER
Buck Sloan is calling to request a refill on the following medication(s):    Medication Request:  Requested Prescriptions     Pending Prescriptions Disp Refills    atorvastatin (LIPITOR) 20 MG tablet [Pharmacy Med Name: Atorvastatin Calcium Oral Tablet 20 MG] 90 tablet 0     Sig: TAKE 1 TABLET BY MOUTH EVERY DAY    LANTUS SOLOSTAR 100 UNIT/ML injection pen [Pharmacy Med Name: Lantus SoloStar Subcutaneous Solution Pen-injector 100 UNIT/ML] 15 mL 0     Sig: INJECT 20 UNITS UNDER THE SKIN 2 TIMES DAILY       Last Visit Date (If Applicable):  5/6/2025    Next Visit Date:    8/6/2025

## 2025-07-07 DIAGNOSIS — Z79.4 TYPE 2 DIABETES MELLITUS WITHOUT COMPLICATION, WITH LONG-TERM CURRENT USE OF INSULIN (HCC): ICD-10-CM

## 2025-07-07 DIAGNOSIS — E11.9 TYPE 2 DIABETES MELLITUS WITHOUT COMPLICATION, WITH LONG-TERM CURRENT USE OF INSULIN (HCC): ICD-10-CM

## 2025-07-07 RX ORDER — INSULIN GLARGINE 100 [IU]/ML
INJECTION, SOLUTION SUBCUTANEOUS
Qty: 15 ML | Refills: 0 | Status: SHIPPED | OUTPATIENT
Start: 2025-07-07

## 2025-07-07 NOTE — TELEPHONE ENCOUNTER
Buck Sloan is calling to request a refill on the following medication(s):    Medication Request:  Requested Prescriptions     Pending Prescriptions Disp Refills    LANTUS SOLOSTAR 100 UNIT/ML injection pen [Pharmacy Med Name: Lantus SoloStar Subcutaneous Solution Pen-injector 100 UNIT/ML] 15 mL 0     Sig: INJECT 20 UNITS UNDER THE SKIN 2 TIMES DAILY       Last Visit Date (If Applicable):  5/6/2025    Next Visit Date:    8/6/2025

## 2025-07-24 ENCOUNTER — OFFICE VISIT (OUTPATIENT)
Dept: GASTROENTEROLOGY | Age: 70
End: 2025-07-24
Payer: MEDICARE

## 2025-07-24 VITALS
DIASTOLIC BLOOD PRESSURE: 77 MMHG | RESPIRATION RATE: 16 BRPM | SYSTOLIC BLOOD PRESSURE: 112 MMHG | TEMPERATURE: 97.8 F | HEART RATE: 80 BPM | WEIGHT: 252 LBS | HEIGHT: 68 IN | OXYGEN SATURATION: 94 % | BODY MASS INDEX: 38.19 KG/M2

## 2025-07-24 DIAGNOSIS — K75.81 NASH (NONALCOHOLIC STEATOHEPATITIS): Primary | ICD-10-CM

## 2025-07-24 PROCEDURE — 3074F SYST BP LT 130 MM HG: CPT | Performed by: INTERNAL MEDICINE

## 2025-07-24 PROCEDURE — 1036F TOBACCO NON-USER: CPT | Performed by: INTERNAL MEDICINE

## 2025-07-24 PROCEDURE — 3017F COLORECTAL CA SCREEN DOC REV: CPT | Performed by: INTERNAL MEDICINE

## 2025-07-24 PROCEDURE — 1159F MED LIST DOCD IN RCRD: CPT | Performed by: INTERNAL MEDICINE

## 2025-07-24 PROCEDURE — G8417 CALC BMI ABV UP PARAM F/U: HCPCS | Performed by: INTERNAL MEDICINE

## 2025-07-24 PROCEDURE — 1126F AMNT PAIN NOTED NONE PRSNT: CPT | Performed by: INTERNAL MEDICINE

## 2025-07-24 PROCEDURE — 3078F DIAST BP <80 MM HG: CPT | Performed by: INTERNAL MEDICINE

## 2025-07-24 PROCEDURE — 1123F ACP DISCUSS/DSCN MKR DOCD: CPT | Performed by: INTERNAL MEDICINE

## 2025-07-24 PROCEDURE — G8427 DOCREV CUR MEDS BY ELIG CLIN: HCPCS | Performed by: INTERNAL MEDICINE

## 2025-07-24 PROCEDURE — 99214 OFFICE O/P EST MOD 30 MIN: CPT | Performed by: INTERNAL MEDICINE

## 2025-07-24 RX ORDER — MULTIVIT WITH MINERALS/LUTEIN
1000 TABLET ORAL DAILY
Qty: 30 CAPSULE | Refills: 1 | Status: SHIPPED | OUTPATIENT
Start: 2025-07-24

## 2025-07-24 NOTE — PROGRESS NOTES
GI CLINIC FOLLOW UP    INTERVAL HISTORY:   No referring provider defined for this encounter.    Chief Complaint   Patient presents with    Follow-up     4 month follow up: Fatty Liver (F3-F4).  Hepatic Function Panel not completed.  Doing well; no new symptoms or concerns.           HISTORY OF PRESENT ILLNESS: Mr.Michael ANTHONY Sloan is a 70 y.o. male , referred for evaluation of abnormal LFT's.    He has h/o- Hep C which he reportedly got from transfusion in 1970's. He was treated for Hep C with Viekira XR and Ribasphere in 2017.   His liver US in 2022 which showed-  increased echogenicity without obvious focal abnormality or evidence of intrahepatic biliary ductal dilatation.      We did liver US which showed hepatic steatosis and liver elastography showed- : The median tissue stiffness was 12.04 kPa and the average tissue stiffness was 13.11 kPa which corresponds to F3-F4 moderate to severe grading of fibrosis.     He rarely drinks alcohol now. He has diabetes which is controlled    His latest LFT's show mild elevation of ALT    Past Medical,Family, and Social History reviewed and does contribute to the patient presentingcondition.    I did review all the labs results available for the labs which were ordered by the primary care physician, and the other consultants, we search on epic at [facility] and all the available care everywhere epic    I did review all the imaging studies of the abdomen available on EMR, ordered by the primary care physician and the other consultant    I did review all the pathology from the biopsies done on the previous endoscopies    Patient's PMH/PSH,SH,PSYCH Hx, MEDs, ALLERGIES, and ROS were all reviewed and updated in the appropriate sections.    PAST MEDICAL HISTORY:  Past Medical History:   Diagnosis Date    Hepatitis C     Hypertension     Liver disease     sees GI, treated for Hep C     Non-rheumatic mitral regurgitation     sees cardio,    Prediabetes 7/2/2018    Renal insufficiency

## 2025-08-06 ENCOUNTER — OFFICE VISIT (OUTPATIENT)
Dept: FAMILY MEDICINE CLINIC | Age: 70
End: 2025-08-06

## 2025-08-06 VITALS
WEIGHT: 254 LBS | SYSTOLIC BLOOD PRESSURE: 120 MMHG | HEIGHT: 68 IN | BODY MASS INDEX: 38.49 KG/M2 | HEART RATE: 85 BPM | OXYGEN SATURATION: 97 % | DIASTOLIC BLOOD PRESSURE: 76 MMHG

## 2025-08-06 DIAGNOSIS — Z79.4 TYPE 2 DIABETES MELLITUS WITHOUT COMPLICATION, WITH LONG-TERM CURRENT USE OF INSULIN (HCC): ICD-10-CM

## 2025-08-06 DIAGNOSIS — E11.9 TYPE 2 DIABETES MELLITUS WITHOUT COMPLICATION, WITH LONG-TERM CURRENT USE OF INSULIN (HCC): ICD-10-CM

## 2025-08-06 DIAGNOSIS — Z00.00 MEDICARE ANNUAL WELLNESS VISIT, SUBSEQUENT: Primary | ICD-10-CM

## 2025-08-06 SDOH — ECONOMIC STABILITY: FOOD INSECURITY: WITHIN THE PAST 12 MONTHS, YOU WORRIED THAT YOUR FOOD WOULD RUN OUT BEFORE YOU GOT MONEY TO BUY MORE.: NEVER TRUE

## 2025-08-06 SDOH — ECONOMIC STABILITY: FOOD INSECURITY: WITHIN THE PAST 12 MONTHS, THE FOOD YOU BOUGHT JUST DIDN'T LAST AND YOU DIDN'T HAVE MONEY TO GET MORE.: NEVER TRUE

## 2025-08-06 ASSESSMENT — PATIENT HEALTH QUESTIONNAIRE - PHQ9
SUM OF ALL RESPONSES TO PHQ QUESTIONS 1-9: 0
2. FEELING DOWN, DEPRESSED OR HOPELESS: NOT AT ALL
SUM OF ALL RESPONSES TO PHQ QUESTIONS 1-9: 0
1. LITTLE INTEREST OR PLEASURE IN DOING THINGS: NOT AT ALL

## 2025-08-07 LAB — HBA1C MFR BLD: 5.8 %

## 2025-08-11 DIAGNOSIS — Z79.4 TYPE 2 DIABETES MELLITUS WITHOUT COMPLICATION, WITH LONG-TERM CURRENT USE OF INSULIN (HCC): ICD-10-CM

## 2025-08-11 DIAGNOSIS — D75.1 POLYCYTHEMIA: Primary | ICD-10-CM

## 2025-08-11 DIAGNOSIS — E11.9 TYPE 2 DIABETES MELLITUS WITHOUT COMPLICATION, WITH LONG-TERM CURRENT USE OF INSULIN (HCC): ICD-10-CM

## 2025-08-11 RX ORDER — INSULIN GLARGINE 100 [IU]/ML
INJECTION, SOLUTION SUBCUTANEOUS
Qty: 15 ML | Refills: 2 | Status: SHIPPED | OUTPATIENT
Start: 2025-08-11

## 2025-08-15 ENCOUNTER — OFFICE VISIT (OUTPATIENT)
Age: 70
End: 2025-08-15
Payer: MEDICARE

## 2025-08-15 ENCOUNTER — HOSPITAL ENCOUNTER (OUTPATIENT)
Age: 70
Discharge: HOME | End: 2025-08-15
Payer: MEDICARE

## 2025-08-15 ENCOUNTER — TELEPHONE (OUTPATIENT)
Age: 70
End: 2025-08-15

## 2025-08-15 VITALS
WEIGHT: 269 LBS | SYSTOLIC BLOOD PRESSURE: 132 MMHG | TEMPERATURE: 96.5 F | HEART RATE: 81 BPM | RESPIRATION RATE: 20 BRPM | DIASTOLIC BLOOD PRESSURE: 82 MMHG | BODY MASS INDEX: 40.9 KG/M2

## 2025-08-15 DIAGNOSIS — D75.1 POLYCYTHEMIA: Primary | ICD-10-CM

## 2025-08-15 DIAGNOSIS — D75.1 POLYCYTHEMIA: ICD-10-CM

## 2025-08-15 LAB
BASOPHILS # BLD: 0 K/UL (ref 0–0.2)
BASOPHILS NFR BLD: 1 % (ref 0–2)
EOSINOPHIL # BLD: 0 K/UL (ref 0–0.4)
EOSINOPHILS RELATIVE PERCENT: 1 % (ref 1–4)
ERYTHROCYTE [DISTWIDTH] IN BLOOD BY AUTOMATED COUNT: 14.8 % (ref 12.5–15.4)
HCT VFR BLD AUTO: 51.2 % (ref 41–53)
HGB BLD-MCNC: 17.3 G/DL (ref 13.5–17.5)
LYMPHOCYTES NFR BLD: 1.9 K/UL (ref 1–4.8)
LYMPHOCYTES RELATIVE PERCENT: 35 % (ref 24–44)
MCH RBC QN AUTO: 29.9 PG (ref 26–34)
MCHC RBC AUTO-ENTMCNC: 33.7 G/DL (ref 31–37)
MCV RBC AUTO: 88.5 FL (ref 80–100)
MONOCYTES NFR BLD: 0.5 K/UL (ref 0.1–1.2)
MONOCYTES NFR BLD: 9 % (ref 2–11)
NEUTROPHILS NFR BLD: 54 % (ref 36–66)
NEUTS SEG NFR BLD: 2.9 K/UL (ref 1.8–7.7)
PLATELET # BLD AUTO: 205 K/UL (ref 140–450)
PMV BLD AUTO: 7.7 FL (ref 6–12)
RBC # BLD AUTO: 5.78 M/UL (ref 4.5–5.9)
WBC OTHER # BLD: 5.4 K/UL (ref 3.5–11)

## 2025-08-15 PROCEDURE — 99211 OFF/OP EST MAY X REQ PHY/QHP: CPT | Performed by: INTERNAL MEDICINE

## 2025-08-15 PROCEDURE — 99213 OFFICE O/P EST LOW 20 MIN: CPT | Performed by: INTERNAL MEDICINE

## 2025-08-15 PROCEDURE — 85025 COMPLETE CBC W/AUTO DIFF WBC: CPT

## 2025-08-15 PROCEDURE — 36415 COLL VENOUS BLD VENIPUNCTURE: CPT

## 2025-09-02 RX ORDER — ATORVASTATIN CALCIUM 20 MG/1
20 TABLET, FILM COATED ORAL DAILY
Qty: 90 TABLET | Refills: 1 | Status: SHIPPED | OUTPATIENT
Start: 2025-09-02